# Patient Record
Sex: FEMALE | Race: WHITE | NOT HISPANIC OR LATINO | Employment: OTHER | URBAN - METROPOLITAN AREA
[De-identification: names, ages, dates, MRNs, and addresses within clinical notes are randomized per-mention and may not be internally consistent; named-entity substitution may affect disease eponyms.]

---

## 2017-06-27 ENCOUNTER — HOSPITAL ENCOUNTER (OUTPATIENT)
Dept: MAMMOGRAPHY | Facility: HOSPITAL | Age: 72
Discharge: HOME | End: 2017-06-27
Attending: SPECIALIST | Admitting: SPECIALIST

## 2017-08-15 ENCOUNTER — HOSPITAL ENCOUNTER (OUTPATIENT)
Dept: ONCOLOGY | Facility: CLINIC | Age: 72
Discharge: HOME | End: 2017-08-15
Attending: FAMILY MEDICINE | Admitting: FAMILY MEDICINE

## 2017-09-01 ENCOUNTER — HOSPITAL ENCOUNTER (OUTPATIENT)
Dept: FAMILY MEDICINE CLINIC | Facility: CLINIC | Age: 72
Setting detail: SPECIMEN
Discharge: HOME | End: 2017-09-01
Attending: FAMILY MEDICINE | Admitting: FAMILY MEDICINE

## 2017-11-01 ENCOUNTER — HOSPITAL ENCOUNTER (OUTPATIENT)
Dept: FAMILY MEDICINE CLINIC | Facility: CLINIC | Age: 72
Setting detail: SPECIMEN
Discharge: HOME | End: 2017-11-01
Attending: FAMILY MEDICINE | Admitting: FAMILY MEDICINE

## 2017-11-01 LAB
ALBUMIN SERPL-MCNC: 4.2 G/DL (ref 3.5–4.8)
ALBUMIN/GLOB SERPL: 1.4 {RATIO} (ref 1–1.7)
ALP SERPL-CCNC: 60 IU/L (ref 32–91)
ALT SERPL-CCNC: 31 IU/L (ref 14–54)
ANION GAP SERPL CALC-SCNC: 11.7 MMOL/L (ref 10–20)
AST SERPL-CCNC: 25 IU/L (ref 15–41)
BASOPHILS # BLD AUTO: 0.1 10*3/UL (ref 0–0.2)
BASOPHILS NFR BLD AUTO: 1 % (ref 0–2)
BILIRUB SERPL-MCNC: 0.1 MG/DL (ref 0.3–1.2)
BILIRUB UR QL STRIP: NEGATIVE MG/DL
BUN SERPL-MCNC: 22 MG/DL (ref 8–20)
BUN/CREAT SERPL: 27.5 (ref 5.4–26.2)
CALCIUM SERPL-MCNC: 9.8 MG/DL (ref 8.9–10.3)
CASTS URNS QL MICRO: ABNORMAL /[LPF]
CHLORIDE SERPL-SCNC: 101 MMOL/L (ref 101–111)
CHOLEST SERPL-MCNC: 129 MG/DL
CHOLEST/HDLC SERPL: 3.4 {RATIO}
CK SERPL-CCNC: 152 IU/L (ref 38–234)
COLOR UR: YELLOW
CONV BACTERIA IN URINE MICRO: NEGATIVE
CONV CLARITY OF URINE: ABNORMAL
CONV CO2: 32 MMOL/L (ref 22–32)
CONV HYALINE CASTS IN URINE MICRO: 1 /[LPF] (ref 0–5)
CONV LDL CHOLESTEROL DIRECT: 58 MG/DL (ref 0–100)
CONV PROTEIN IN URINE BY AUTOMATED TEST STRIP: NEGATIVE MG/DL
CONV SMALL ROUND CELLS: ABNORMAL /[HPF]
CONV TOTAL PROTEIN: 7.3 G/DL (ref 6.1–7.9)
CONV UROBILINOGEN IN URINE BY AUTOMATED TEST STRIP: 0.2 MG/DL
CREAT UR-MCNC: 0.8 MG/DL (ref 0.4–1)
CRP SERPL-MCNC: 0.19 MG/DL (ref 0–0.7)
CULTURE INDICATED?: ABNORMAL
DIFFERENTIAL METHOD BLD: (no result)
EOSINOPHIL # BLD AUTO: 0.2 10*3/UL (ref 0–0.3)
EOSINOPHIL # BLD AUTO: 3 % (ref 0–3)
ERYTHROCYTE [DISTWIDTH] IN BLOOD BY AUTOMATED COUNT: 13.1 % (ref 11.5–14.5)
ERYTHROCYTE [SEDIMENTATION RATE] IN BLOOD BY WESTERGREN METHOD: 12 MM/HR (ref 0–30)
GLOBULIN UR ELPH-MCNC: 3.1 G/DL (ref 2.5–3.8)
GLUCOSE SERPL-MCNC: 90 MG/DL (ref 65–99)
GLUCOSE UR QL: NEGATIVE MG/DL
HCT VFR BLD AUTO: 43.3 % (ref 35–49)
HDLC SERPL-MCNC: 38 MG/DL
HGB BLD-MCNC: 14.4 G/DL (ref 12–15)
HGB UR QL STRIP: NEGATIVE
KETONES UR QL STRIP: NEGATIVE MG/DL
LDLC/HDLC SERPL: 1.6 {RATIO}
LEUKOCYTE ESTERASE UR QL STRIP: NEGATIVE
LIPID INTERPRETATION: ABNORMAL
LYMPHOCYTES # BLD AUTO: 2 10*3/UL (ref 0.8–4.8)
LYMPHOCYTES NFR BLD AUTO: 32 % (ref 18–42)
MCH RBC QN AUTO: 31.2 PG (ref 26–32)
MCHC RBC AUTO-ENTMCNC: 33.4 G/DL (ref 32–36)
MCV RBC AUTO: 93.6 FL (ref 80–94)
MONOCYTES # BLD AUTO: 0.5 10*3/UL (ref 0.1–1.3)
MONOCYTES NFR BLD AUTO: 8 % (ref 2–11)
NEUTROPHILS # BLD AUTO: 3.6 10*3/UL (ref 2.3–8.6)
NEUTROPHILS NFR BLD AUTO: 56 % (ref 50–75)
NITRITE UR QL STRIP: NEGATIVE
NRBC BLD AUTO-RTO: 0 /100{WBCS}
NRBC/RBC NFR BLD MANUAL: 0 10*3/UL
PH UR STRIP.AUTO: 5.5 [PH] (ref 4.5–8)
PLATELET # BLD AUTO: 206 10*3/UL (ref 150–450)
PMV BLD AUTO: 7.9 FL (ref 7.4–10.4)
POTASSIUM SERPL-SCNC: 4.7 MMOL/L (ref 3.6–5.1)
RBC # BLD AUTO: 4.62 10*6/UL (ref 4–5.4)
RBC #/AREA URNS HPF: NEGATIVE /[HPF] (ref 0–3)
SODIUM SERPL-SCNC: 140 MMOL/L (ref 136–144)
SP GR UR: 1.03 (ref 1–1.03)
SPERM URNS QL MICRO: ABNORMAL /[HPF]
SQUAMOUS SPT QL MICRO: 1 /[HPF] (ref 0–5)
TRIGL SERPL-MCNC: 241 MG/DL
TSH SERPL-ACNC: 3.65 UIU/ML (ref 0.34–5.6)
UNIDENT CRYS URNS QL MICRO: ABNORMAL /[HPF]
VIT B12 SERPL-MCNC: 1108 PG/ML (ref 180–914)
VLDLC SERPL CALC-MCNC: 33.1 MG/DL
WBC # BLD AUTO: 6.4 10*3/UL (ref 4.5–11.5)
WBC #/AREA URNS HPF: 1 /[HPF] (ref 0–5)
YEAST SPEC QL WET PREP: ABNORMAL /[HPF]

## 2017-11-02 ENCOUNTER — HOSPITAL ENCOUNTER (OUTPATIENT)
Dept: NUCLEAR MEDICINE | Facility: HOSPITAL | Age: 72
Discharge: HOME | End: 2017-11-02
Attending: FAMILY MEDICINE | Admitting: FAMILY MEDICINE

## 2017-11-03 LAB
ANA SER QL IA: NORMAL

## 2018-10-24 ENCOUNTER — HOSPITAL ENCOUNTER (OUTPATIENT)
Dept: FAMILY MEDICINE CLINIC | Facility: CLINIC | Age: 73
Setting detail: SPECIMEN
Discharge: HOME | End: 2018-10-24
Attending: FAMILY MEDICINE | Admitting: FAMILY MEDICINE

## 2018-10-24 LAB
25(OH)D3 SERPL-MCNC: 39 NG/ML (ref 30–100)
ALBUMIN SERPL-MCNC: 3.8 G/DL (ref 3.5–4.8)
ALBUMIN/GLOB SERPL: 1.2 {RATIO} (ref 1–1.7)
ALP SERPL-CCNC: 69 IU/L (ref 32–91)
ALT SERPL-CCNC: 31 IU/L (ref 14–54)
ANION GAP SERPL CALC-SCNC: 14 MMOL/L (ref 10–20)
AST SERPL-CCNC: 25 IU/L (ref 15–41)
BASOPHILS # BLD AUTO: 0.1 10*3/UL (ref 0–0.2)
BASOPHILS NFR BLD AUTO: 1 % (ref 0–2)
BILIRUB SERPL-MCNC: 0.8 MG/DL (ref 0.3–1.2)
BILIRUB UR QL STRIP: NEGATIVE MG/DL
BUN SERPL-MCNC: 21 MG/DL (ref 8–20)
BUN/CREAT SERPL: 23.3 (ref 5.4–26.2)
CALCIUM SERPL-MCNC: 9.5 MG/DL (ref 8.9–10.3)
CASTS URNS QL MICRO: ABNORMAL /[LPF]
CHLORIDE SERPL-SCNC: 103 MMOL/L (ref 101–111)
CHOLEST SERPL-MCNC: 134 MG/DL
CHOLEST/HDLC SERPL: 3.3 {RATIO}
COLOR UR: YELLOW
CONV BACTERIA IN URINE MICRO: NEGATIVE
CONV CLARITY OF URINE: CLEAR
CONV CO2: 30 MMOL/L (ref 22–32)
CONV HYALINE CASTS IN URINE MICRO: 1 /[LPF] (ref 0–5)
CONV LDL CHOLESTEROL DIRECT: 74 MG/DL (ref 0–100)
CONV PROTEIN IN URINE BY AUTOMATED TEST STRIP: NEGATIVE MG/DL
CONV SMALL ROUND CELLS: ABNORMAL /[HPF]
CONV TOTAL PROTEIN: 7.1 G/DL (ref 6.1–7.9)
CONV UROBILINOGEN IN URINE BY AUTOMATED TEST STRIP: 0.2 MG/DL
CREAT UR-MCNC: 0.9 MG/DL (ref 0.4–1)
CULTURE INDICATED?: ABNORMAL
DIFFERENTIAL METHOD BLD: (no result)
EOSINOPHIL # BLD AUTO: 0.1 10*3/UL (ref 0–0.3)
EOSINOPHIL # BLD AUTO: 2 % (ref 0–3)
ERYTHROCYTE [DISTWIDTH] IN BLOOD BY AUTOMATED COUNT: 13.6 % (ref 11.5–14.5)
ERYTHROCYTE [SEDIMENTATION RATE] IN BLOOD BY WESTERGREN METHOD: 27 MM/HR (ref 0–30)
GLOBULIN UR ELPH-MCNC: 3.3 G/DL (ref 2.5–3.8)
GLUCOSE SERPL-MCNC: 96 MG/DL (ref 65–99)
GLUCOSE UR QL: NEGATIVE MG/DL
HBA1C MFR BLD: 5.7 % (ref 0–5.6)
HCT VFR BLD AUTO: 40.7 % (ref 35–49)
HDLC SERPL-MCNC: 41 MG/DL
HGB BLD-MCNC: 13.4 G/DL (ref 12–15)
HGB UR QL STRIP: NEGATIVE
KETONES UR QL STRIP: NEGATIVE MG/DL
LDLC/HDLC SERPL: 1.8 {RATIO}
LEUKOCYTE ESTERASE UR QL STRIP: ABNORMAL
LIPID INTERPRETATION: NORMAL
LYMPHOCYTES # BLD AUTO: 1.8 10*3/UL (ref 0.8–4.8)
LYMPHOCYTES NFR BLD AUTO: 24 % (ref 18–42)
MCH RBC QN AUTO: 30.8 PG (ref 26–32)
MCHC RBC AUTO-ENTMCNC: 32.9 G/DL (ref 32–36)
MCV RBC AUTO: 93.7 FL (ref 80–94)
MONOCYTES # BLD AUTO: 0.5 10*3/UL (ref 0.1–1.3)
MONOCYTES NFR BLD AUTO: 7 % (ref 2–11)
NEUTROPHILS # BLD AUTO: 5 10*3/UL (ref 2.3–8.6)
NEUTROPHILS NFR BLD AUTO: 66 % (ref 50–75)
NITRITE UR QL STRIP: NEGATIVE
NRBC BLD AUTO-RTO: 0 /100{WBCS}
NRBC/RBC NFR BLD MANUAL: 0 10*3/UL
PH UR STRIP.AUTO: 6.5 [PH] (ref 4.5–8)
PLATELET # BLD AUTO: 209 10*3/UL (ref 150–450)
PMV BLD AUTO: 8.6 FL (ref 7.4–10.4)
POTASSIUM SERPL-SCNC: 5 MMOL/L (ref 3.6–5.1)
RBC # BLD AUTO: 4.35 10*6/UL (ref 4–5.4)
RBC #/AREA URNS HPF: 2 /[HPF] (ref 0–3)
SODIUM SERPL-SCNC: 142 MMOL/L (ref 136–144)
SP GR UR: 1.02 (ref 1–1.03)
SPERM URNS QL MICRO: ABNORMAL /[HPF]
SQUAMOUS SPT QL MICRO: 4 /[HPF] (ref 0–5)
T4 FREE SERPL-MCNC: 0.74 NG/DL (ref 0.58–1.64)
TRIGL SERPL-MCNC: 112 MG/DL
TSH SERPL-ACNC: 2.42 UIU/ML (ref 0.34–5.6)
UNIDENT CRYS URNS QL MICRO: ABNORMAL /[HPF]
VIT B12 SERPL-MCNC: 1106 PG/ML (ref 180–914)
VLDLC SERPL CALC-MCNC: 19.2 MG/DL
WBC # BLD AUTO: 7.5 10*3/UL (ref 4.5–11.5)
WBC #/AREA URNS HPF: 5 /[HPF] (ref 0–5)
YEAST SPEC QL WET PREP: ABNORMAL /[HPF]

## 2018-12-10 ENCOUNTER — CLINICAL SUPPORT (OUTPATIENT)
Dept: ONCOLOGY | Facility: HOSPITAL | Age: 73
End: 2018-12-10

## 2018-12-10 ENCOUNTER — HOSPITAL ENCOUNTER (OUTPATIENT)
Dept: ONCOLOGY | Facility: CLINIC | Age: 73
Setting detail: INFUSION SERIES
Discharge: HOME | End: 2018-12-10
Attending: INTERNAL MEDICINE | Admitting: INTERNAL MEDICINE

## 2019-01-25 ENCOUNTER — HOSPITAL ENCOUNTER (OUTPATIENT)
Dept: ONCOLOGY | Facility: CLINIC | Age: 74
Setting detail: INFUSION SERIES
Discharge: HOME | End: 2019-01-25
Attending: INTERNAL MEDICINE | Admitting: INTERNAL MEDICINE

## 2019-01-25 ENCOUNTER — CLINICAL SUPPORT (OUTPATIENT)
Dept: ONCOLOGY | Facility: HOSPITAL | Age: 74
End: 2019-01-25

## 2019-02-15 ENCOUNTER — HOSPITAL ENCOUNTER (OUTPATIENT)
Dept: URGENT CARE | Facility: CLINIC | Age: 74
Discharge: HOME | End: 2019-02-15
Attending: FAMILY MEDICINE | Admitting: FAMILY MEDICINE

## 2019-06-17 RX ORDER — PHENTERMINE HYDROCHLORIDE 37.5 MG/1
TABLET ORAL
Qty: 30 TABLET | Refills: 0 | Status: SHIPPED | OUTPATIENT
Start: 2019-06-17 | End: 2019-08-20 | Stop reason: SDUPTHER

## 2019-06-28 RX ORDER — HYDROCODONE BITARTRATE AND ACETAMINOPHEN 10; 325 MG/1; MG/1
TABLET ORAL
COMMUNITY
Start: 2019-02-01 | End: 2019-06-28 | Stop reason: SDUPTHER

## 2019-06-28 RX ORDER — HYDROCODONE BITARTRATE AND ACETAMINOPHEN 10; 325 MG/1; MG/1
TABLET ORAL
Qty: 120 TABLET | Refills: 0 | Status: SHIPPED | OUTPATIENT
Start: 2019-06-28 | End: 2019-07-29 | Stop reason: SDUPTHER

## 2019-07-29 ENCOUNTER — TELEPHONE (OUTPATIENT)
Dept: FAMILY MEDICINE CLINIC | Facility: CLINIC | Age: 74
End: 2019-07-29

## 2019-07-29 RX ORDER — HYDROCODONE BITARTRATE AND ACETAMINOPHEN 10; 325 MG/1; MG/1
TABLET ORAL
Qty: 120 TABLET | Refills: 0 | Status: SHIPPED | OUTPATIENT
Start: 2019-07-29 | End: 2019-08-29 | Stop reason: SDUPTHER

## 2019-08-21 RX ORDER — PHENTERMINE HYDROCHLORIDE 37.5 MG/1
TABLET ORAL
Qty: 30 TABLET | Refills: 0 | Status: SHIPPED | OUTPATIENT
Start: 2019-08-21 | End: 2019-10-17 | Stop reason: SDUPTHER

## 2019-08-29 ENCOUNTER — TELEPHONE (OUTPATIENT)
Dept: FAMILY MEDICINE CLINIC | Facility: CLINIC | Age: 74
End: 2019-08-29

## 2019-08-29 RX ORDER — HYDROCODONE BITARTRATE AND ACETAMINOPHEN 10; 325 MG/1; MG/1
TABLET ORAL
Qty: 120 TABLET | Refills: 0 | Status: SHIPPED | OUTPATIENT
Start: 2019-08-29 | End: 2019-09-27 | Stop reason: SDUPTHER

## 2019-09-18 ENCOUNTER — TELEPHONE (OUTPATIENT)
Dept: FAMILY MEDICINE CLINIC | Facility: CLINIC | Age: 74
End: 2019-09-18

## 2019-09-18 NOTE — TELEPHONE ENCOUNTER
Patient has heard there are different types of flu shots available at the pharmacies.  I know we just carry the standard one.  Is there one you recommend for her and her  since they are senior citizens?  Please advise.

## 2019-09-27 RX ORDER — HYDROCODONE BITARTRATE AND ACETAMINOPHEN 10; 325 MG/1; MG/1
TABLET ORAL
Qty: 120 TABLET | Refills: 0 | Status: SHIPPED | OUTPATIENT
Start: 2019-09-27 | End: 2019-10-25 | Stop reason: SDUPTHER

## 2019-10-01 ENCOUNTER — TELEPHONE (OUTPATIENT)
Dept: FAMILY MEDICINE CLINIC | Facility: CLINIC | Age: 74
End: 2019-10-01

## 2019-10-01 NOTE — TELEPHONE ENCOUNTER
Patient is asking if she should get Shingrix. She states she has a compromised immune system and has researched that it is not a good thing to get. 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 Please advise.

## 2019-10-01 NOTE — TELEPHONE ENCOUNTER
Its really up to Maylin.   I think the vaccine is safer then getting the actual disease so its a coin toss for her.  She can just wait and hope she never gets shingles or she can take the risk of the vaccine which is certainly a fact but its not very common that people get into trouble with it.

## 2019-10-02 ENCOUNTER — TELEPHONE (OUTPATIENT)
Dept: FAMILY MEDICINE CLINIC | Facility: CLINIC | Age: 74
End: 2019-10-02

## 2019-10-02 NOTE — TELEPHONE ENCOUNTER
Patient needs the Shingrix.  Tell her that since she is 5 years out from the initial shingles vaccine she can get the new shin Grix at any time.  I would recommend that and not a repeat Zostrix.  I am not even sure they carry it anymore

## 2019-10-02 NOTE — TELEPHONE ENCOUNTER
Patient had a zoster vaccine in 2012.  Should she get another zoster vaccine instead of getting the Shingrix?  Leave a message if no answer.

## 2019-10-17 RX ORDER — PHENTERMINE HYDROCHLORIDE 37.5 MG/1
TABLET ORAL
Qty: 30 TABLET | Refills: 0 | Status: SHIPPED | OUTPATIENT
Start: 2019-10-17 | End: 2019-11-27 | Stop reason: SDUPTHER

## 2019-10-25 RX ORDER — HYDROCODONE BITARTRATE AND ACETAMINOPHEN 10; 325 MG/1; MG/1
TABLET ORAL
Qty: 120 TABLET | Refills: 0 | Status: SHIPPED | OUTPATIENT
Start: 2019-10-25 | End: 2019-11-25 | Stop reason: SDUPTHER

## 2019-11-07 RX ORDER — CELECOXIB 200 MG/1
CAPSULE ORAL
Qty: 180 CAPSULE | Refills: 4 | Status: SHIPPED | OUTPATIENT
Start: 2019-11-07 | End: 2021-01-15 | Stop reason: SDUPTHER

## 2019-11-14 RX ORDER — ATORVASTATIN CALCIUM 20 MG/1
20 TABLET, FILM COATED ORAL DAILY
COMMUNITY
End: 2019-11-14

## 2019-11-14 RX ORDER — ATORVASTATIN CALCIUM 10 MG/1
10 TABLET, FILM COATED ORAL DAILY
Qty: 90 TABLET | Refills: 1 | Status: SHIPPED | OUTPATIENT
Start: 2019-11-14 | End: 2020-04-06 | Stop reason: SDUPTHER

## 2019-11-18 ENCOUNTER — TELEPHONE (OUTPATIENT)
Dept: FAMILY MEDICINE CLINIC | Facility: CLINIC | Age: 74
End: 2019-11-18

## 2019-11-18 NOTE — TELEPHONE ENCOUNTER
Patient wanted a note put in her chart that all her maintenance medications need to go to [facility]. Thank you.

## 2019-11-25 ENCOUNTER — TELEPHONE (OUTPATIENT)
Dept: FAMILY MEDICINE CLINIC | Facility: CLINIC | Age: 74
End: 2019-11-25

## 2019-11-25 RX ORDER — HYDROCODONE BITARTRATE AND ACETAMINOPHEN 10; 325 MG/1; MG/1
TABLET ORAL
Qty: 120 TABLET | Refills: 0 | Status: SHIPPED | OUTPATIENT
Start: 2019-11-25 | End: 2019-12-26 | Stop reason: SDUPTHER

## 2019-11-27 ENCOUNTER — TELEPHONE (OUTPATIENT)
Dept: FAMILY MEDICINE CLINIC | Facility: CLINIC | Age: 74
End: 2019-11-27

## 2019-11-27 ENCOUNTER — OFFICE VISIT (OUTPATIENT)
Dept: FAMILY MEDICINE CLINIC | Facility: CLINIC | Age: 74
End: 2019-11-27

## 2019-11-27 VITALS
WEIGHT: 197.8 LBS | SYSTOLIC BLOOD PRESSURE: 140 MMHG | BODY MASS INDEX: 31.79 KG/M2 | TEMPERATURE: 98 F | RESPIRATION RATE: 16 BRPM | HEART RATE: 79 BPM | OXYGEN SATURATION: 95 % | HEIGHT: 66 IN | DIASTOLIC BLOOD PRESSURE: 74 MMHG

## 2019-11-27 DIAGNOSIS — E66.3 OVERWEIGHT: ICD-10-CM

## 2019-11-27 DIAGNOSIS — M15.9 PRIMARY OSTEOARTHRITIS INVOLVING MULTIPLE JOINTS: ICD-10-CM

## 2019-11-27 DIAGNOSIS — E78.2 MIXED HYPERLIPIDEMIA: Primary | ICD-10-CM

## 2019-11-27 DIAGNOSIS — K21.00 GASTRO-ESOPHAGEAL REFLUX DISEASE WITH ESOPHAGITIS: ICD-10-CM

## 2019-11-27 DIAGNOSIS — E55.9 VITAMIN D DEFICIENCY, UNSPECIFIED: ICD-10-CM

## 2019-11-27 DIAGNOSIS — G89.29 CHRONIC BILATERAL LOW BACK PAIN WITHOUT SCIATICA: ICD-10-CM

## 2019-11-27 DIAGNOSIS — Z00.00 MEDICARE ANNUAL WELLNESS VISIT, SUBSEQUENT: ICD-10-CM

## 2019-11-27 DIAGNOSIS — M54.50 CHRONIC BILATERAL LOW BACK PAIN WITHOUT SCIATICA: ICD-10-CM

## 2019-11-27 DIAGNOSIS — I10 ESSENTIAL HYPERTENSION: ICD-10-CM

## 2019-11-27 DIAGNOSIS — E11.9 TYPE 2 DIABETES MELLITUS WITHOUT COMPLICATION, WITHOUT LONG-TERM CURRENT USE OF INSULIN (HCC): ICD-10-CM

## 2019-11-27 PROBLEM — E78.5 HYPERLIPIDEMIA: Status: ACTIVE | Noted: 2019-11-27

## 2019-11-27 LAB
25(OH)D3 SERPL-MCNC: 49.3 NG/ML (ref 30–100)
ALBUMIN SERPL-MCNC: 4.8 G/DL (ref 3.5–5.2)
ALBUMIN/GLOB SERPL: 1.8 G/DL
ALP SERPL-CCNC: 57 U/L (ref 39–117)
ALT SERPL W P-5'-P-CCNC: 33 U/L (ref 1–33)
ANION GAP SERPL CALCULATED.3IONS-SCNC: 11.4 MMOL/L (ref 5–15)
AST SERPL-CCNC: 22 U/L (ref 1–32)
BASOPHILS # BLD AUTO: 0.03 10*3/MM3 (ref 0–0.2)
BASOPHILS NFR BLD AUTO: 0.6 % (ref 0–1.5)
BILIRUB SERPL-MCNC: 0.3 MG/DL (ref 0.2–1.2)
BILIRUB UR QL STRIP: NEGATIVE
BUN BLD-MCNC: 20 MG/DL (ref 8–23)
BUN/CREAT SERPL: 22.5 (ref 7–25)
CALCIUM SPEC-SCNC: 10 MG/DL (ref 8.6–10.5)
CHLORIDE SERPL-SCNC: 102 MMOL/L (ref 98–107)
CHOLEST SERPL-MCNC: 102 MG/DL (ref 0–200)
CLARITY UR: CLEAR
CO2 SERPL-SCNC: 30.6 MMOL/L (ref 22–29)
COLOR UR: YELLOW
CREAT BLD-MCNC: 0.89 MG/DL (ref 0.57–1)
DEPRECATED RDW RBC AUTO: 42.9 FL (ref 37–54)
EOSINOPHIL # BLD AUTO: 0.11 10*3/MM3 (ref 0–0.4)
EOSINOPHIL NFR BLD AUTO: 2.3 % (ref 0.3–6.2)
ERYTHROCYTE [DISTWIDTH] IN BLOOD BY AUTOMATED COUNT: 12.6 % (ref 12.3–15.4)
ERYTHROCYTE [SEDIMENTATION RATE] IN BLOOD: 7 MM/HR (ref 0–30)
FERRITIN SERPL-MCNC: 60.6 NG/ML (ref 13–150)
GFR SERPL CREATININE-BSD FRML MDRD: 62 ML/MIN/1.73
GLOBULIN UR ELPH-MCNC: 2.6 GM/DL
GLUCOSE BLD-MCNC: 104 MG/DL (ref 65–99)
GLUCOSE UR STRIP-MCNC: NEGATIVE MG/DL
HBA1C MFR BLD: 5.5 % (ref 3.5–5.6)
HCT VFR BLD AUTO: 39.6 % (ref 34–46.6)
HDLC SERPL-MCNC: 35 MG/DL (ref 40–60)
HGB BLD-MCNC: 13.5 G/DL (ref 12–15.9)
HGB UR QL STRIP.AUTO: NEGATIVE
IMM GRANULOCYTES # BLD AUTO: 0.02 10*3/MM3 (ref 0–0.05)
IMM GRANULOCYTES NFR BLD AUTO: 0.4 % (ref 0–0.5)
KETONES UR QL STRIP: NEGATIVE
LDLC SERPL CALC-MCNC: 49 MG/DL (ref 0–100)
LDLC/HDLC SERPL: 1.39 {RATIO}
LEUKOCYTE ESTERASE UR QL STRIP.AUTO: NEGATIVE
LYMPHOCYTES # BLD AUTO: 1.34 10*3/MM3 (ref 0.7–3.1)
LYMPHOCYTES NFR BLD AUTO: 28.6 % (ref 19.6–45.3)
MCH RBC QN AUTO: 32 PG (ref 26.6–33)
MCHC RBC AUTO-ENTMCNC: 34.1 G/DL (ref 31.5–35.7)
MCV RBC AUTO: 93.8 FL (ref 79–97)
MONOCYTES # BLD AUTO: 0.44 10*3/MM3 (ref 0.1–0.9)
MONOCYTES NFR BLD AUTO: 9.4 % (ref 5–12)
NEUTROPHILS # BLD AUTO: 2.75 10*3/MM3 (ref 1.7–7)
NEUTROPHILS NFR BLD AUTO: 58.7 % (ref 42.7–76)
NITRITE UR QL STRIP: NEGATIVE
NRBC BLD AUTO-RTO: 0 /100 WBC (ref 0–0.2)
PH UR STRIP.AUTO: 6.5 [PH] (ref 5–8)
PLATELET # BLD AUTO: 214 10*3/MM3 (ref 140–450)
PMV BLD AUTO: 11.1 FL (ref 6–12)
POTASSIUM BLD-SCNC: 4.4 MMOL/L (ref 3.5–5.2)
PROT SERPL-MCNC: 7.4 G/DL (ref 6–8.5)
PROT UR QL STRIP: NEGATIVE
RBC # BLD AUTO: 4.22 10*6/MM3 (ref 3.77–5.28)
SODIUM BLD-SCNC: 144 MMOL/L (ref 136–145)
SP GR UR STRIP: 1.02 (ref 1–1.03)
T4 FREE SERPL-MCNC: 1.28 NG/DL (ref 0.93–1.7)
TRIGL SERPL-MCNC: 92 MG/DL (ref 0–150)
TSH SERPL DL<=0.05 MIU/L-ACNC: 2.41 UIU/ML (ref 0.27–4.2)
UROBILINOGEN UR QL STRIP: NORMAL
VIT B12 BLD-MCNC: 1009 PG/ML (ref 211–946)
VLDLC SERPL-MCNC: 18.4 MG/DL (ref 5–40)
WBC NRBC COR # BLD: 4.69 10*3/MM3 (ref 3.4–10.8)

## 2019-11-27 PROCEDURE — 82728 ASSAY OF FERRITIN: CPT | Performed by: FAMILY MEDICINE

## 2019-11-27 PROCEDURE — 82607 VITAMIN B-12: CPT | Performed by: FAMILY MEDICINE

## 2019-11-27 PROCEDURE — 99213 OFFICE O/P EST LOW 20 MIN: CPT | Performed by: FAMILY MEDICINE

## 2019-11-27 PROCEDURE — 84443 ASSAY THYROID STIM HORMONE: CPT | Performed by: FAMILY MEDICINE

## 2019-11-27 PROCEDURE — 81003 URINALYSIS AUTO W/O SCOPE: CPT | Performed by: FAMILY MEDICINE

## 2019-11-27 PROCEDURE — 82306 VITAMIN D 25 HYDROXY: CPT | Performed by: FAMILY MEDICINE

## 2019-11-27 PROCEDURE — 84439 ASSAY OF FREE THYROXINE: CPT | Performed by: FAMILY MEDICINE

## 2019-11-27 PROCEDURE — 80053 COMPREHEN METABOLIC PANEL: CPT | Performed by: FAMILY MEDICINE

## 2019-11-27 PROCEDURE — 85025 COMPLETE CBC W/AUTO DIFF WBC: CPT | Performed by: FAMILY MEDICINE

## 2019-11-27 PROCEDURE — 80061 LIPID PANEL: CPT | Performed by: FAMILY MEDICINE

## 2019-11-27 PROCEDURE — 85652 RBC SED RATE AUTOMATED: CPT | Performed by: FAMILY MEDICINE

## 2019-11-27 PROCEDURE — 83036 HEMOGLOBIN GLYCOSYLATED A1C: CPT | Performed by: FAMILY MEDICINE

## 2019-11-27 PROCEDURE — G0439 PPPS, SUBSEQ VISIT: HCPCS | Performed by: FAMILY MEDICINE

## 2019-11-27 RX ORDER — ASPIRIN 81 MG/1
81 TABLET ORAL DAILY
COMMUNITY
End: 2021-05-25 | Stop reason: ALTCHOICE

## 2019-11-27 RX ORDER — QUINAPRIL 40 MG/1
TABLET ORAL
COMMUNITY
Start: 2019-09-07 | End: 2020-04-01 | Stop reason: SDUPTHER

## 2019-11-27 RX ORDER — AMOXICILLIN 500 MG
CAPSULE ORAL
COMMUNITY
End: 2021-08-26

## 2019-11-27 RX ORDER — MELATONIN
DAILY
COMMUNITY
End: 2021-05-25 | Stop reason: SDUPTHER

## 2019-11-27 RX ORDER — ESOMEPRAZOLE MAGNESIUM 40 MG/1
CAPSULE, DELAYED RELEASE ORAL
COMMUNITY
Start: 2015-12-28 | End: 2021-05-25 | Stop reason: ALTCHOICE

## 2019-11-27 RX ORDER — AMLODIPINE BESYLATE 10 MG/1
TABLET ORAL
COMMUNITY
Start: 2019-09-21 | End: 2019-12-20

## 2019-11-27 RX ORDER — IBUPROFEN 400 MG/1
400 TABLET ORAL
COMMUNITY
End: 2021-01-18

## 2019-11-27 RX ORDER — CARISOPRODOL 350 MG/1
350 TABLET ORAL DAILY
COMMUNITY
End: 2020-06-15 | Stop reason: SDUPTHER

## 2019-11-27 RX ORDER — FLUOXETINE HYDROCHLORIDE 20 MG/1
CAPSULE ORAL
COMMUNITY
Start: 2019-10-09 | End: 2020-04-06

## 2019-11-27 RX ORDER — PHENTERMINE HYDROCHLORIDE 37.5 MG/1
37.5 TABLET ORAL DAILY
Qty: 30 TABLET | Refills: 0 | Status: SHIPPED | OUTPATIENT
Start: 2019-11-27 | End: 2019-12-26 | Stop reason: SDUPTHER

## 2019-11-27 RX ORDER — QUINIDINE GLUCONATE 324 MG
TABLET, EXTENDED RELEASE ORAL 2 TIMES DAILY WITH MEALS
COMMUNITY
End: 2022-09-05

## 2019-11-27 RX ORDER — MULTIVITAMIN
TABLET ORAL
COMMUNITY

## 2019-11-27 RX ORDER — PANTOPRAZOLE SODIUM 40 MG/1
TABLET, DELAYED RELEASE ORAL
COMMUNITY
Start: 2019-11-24 | End: 2021-01-20 | Stop reason: SDUPTHER

## 2019-11-27 RX ORDER — CHOLECALCIFEROL (VITAMIN D3) 125 MCG
CAPSULE ORAL
COMMUNITY
End: 2021-08-26

## 2019-11-27 NOTE — TELEPHONE ENCOUNTER
Patient called stating that she forgot to tell you at appt that she would like script for PHENTERMINE that was discussed to be [facility].

## 2019-11-27 NOTE — ASSESSMENT & PLAN NOTE
Patient is up to date with exam, labs, mammo, colon and immunizations.  We will continue to follow closely and make sure she stays up-to-date with her screening but so far so good.

## 2019-11-27 NOTE — PROGRESS NOTES
The ABCs of the Annual Wellness Visit  Subsequent Medicare Wellness Visit    Chief Complaint   Patient presents with   • Medicare Wellness-subsequent       Subjective   History of Present Illness:  Maylin Garcia is a 74 y.o. female who presents for a Subsequent Medicare Wellness Visit.  Maylin has done a good job and staying up with her preventative care.  Mammogram is up-to-date.  DEXA up-to-date.  Colonoscopy up-to-date.  Immunizations up-to-date.  She will have a health risk assessment today and if we are behind on any screening we will plan on catching her up.        [facility] is also here today for a general physical exam and laboratory panel.  The medical problems that we follow her for in the office include hyperlipidemia, hypertension, gastroesophageal reflux disease with esophagitis, type 2 diabetes, chronic low back pain, degenerative joint disease, and obesity.  These problems will be addressed today and depending on lab results adjustments made in her treatments.      HEALTH RISK ASSESSMENT    Recent Hospitalizations:  No hospitalization(s) within the last year.    Current Medical Providers:  Patient Care Team:  Abiodun Busch MD as PCP - General (Family Medicine)  Enoc Anthony DPM as PCP - Claims Attributed    Smoking Status:  Social History     Tobacco Use   Smoking Status Former Smoker   Smokeless Tobacco Never Used       Alcohol Consumption:  Social History     Substance and Sexual Activity   Alcohol Use No   • Frequency: Never       Depression Screen:   PHQ-2/PHQ-9 Depression Screening 11/27/2019   Little interest or pleasure in doing things 0   Feeling down, depressed, or hopeless 0   Total Score 0       Fall Risk Screen:  SHANTHIADI Fall Risk Assessment was completed, and patient is at MODERATE risk for falls. Assessment completed on:11/27/2019    Health Habits and Functional and Cognitive Screening:  Functional & Cognitive Status 11/27/2019   Do you have difficulty preparing food and  eating? No   Do you have difficulty bathing yourself, getting dressed or grooming yourself? No   Do you have difficulty using the toilet? No   Do you have difficulty moving around from place to place? No   Do you have trouble with steps or getting out of a bed or a chair? No   Current Diet Well Balanced Diet   Dental Exam Up to date   Eye Exam Up to date   Exercise (times per week) 4 times per week   Current Exercise Activities Include Cardiovasular Workout on Exercise Equipment   Do you need help using the phone?  No   Are you deaf or do you have serious difficulty hearing?  No   Do you need help with transportation? No   Do you need help shopping? No   Do you need help preparing meals?  No   Do you need help with housework?  No   Do you need help with laundry? No   Do you need help taking your medications? No   Do you need help managing money? No   Do you ever drive or ride in a car without wearing a seat belt? No   Have you felt unusual stress, anger or loneliness in the last month? No   Who do you live with? Spouse   If you need help, do you have trouble finding someone available to you? No   Have you been bothered in the last four weeks by sexual problems? No   Do you have difficulty concentrating, remembering or making decisions? No         Does the patient have evidence of cognitive impairment? No    Asprin use counseling:Taking ASA appropriately as indicated    Age-appropriate Screening Schedule:  Refer to the list below for future screening recommendations based on patient's age, sex and/or medical conditions. Orders for these recommended tests are listed in the plan section. The patient has been provided with a written plan.    Health Maintenance   Topic Date Due   • URINE MICROALBUMIN  1945   • ZOSTER VACCINE (1 of 2) 11/23/1997   • PNEUMOCOCCAL VACCINES (65+ LOW/MEDIUM RISK) (2 of 2 - PCV13) 07/06/2016   • DIABETIC FOOT EXAM  12/10/2018   • DIABETIC EYE EXAM  05/03/2020   • HEMOGLOBIN A1C   05/27/2020   • LIPID PANEL  11/27/2020   • MAMMOGRAM  05/29/2021   • TDAP/TD VACCINES (2 - Td) 09/20/2022   • COLONOSCOPY  02/21/2029   • INFLUENZA VACCINE  Completed          The following portions of the patient's history were reviewed and updated as appropriate: allergies, current medications, past family history, past medical history, past social history, past surgical history and problem list.    Outpatient Medications Prior to Visit   Medication Sig Dispense Refill   • amLODIPine (NORVASC) 10 MG tablet      • aspirin 81 MG EC tablet Take 81 mg by mouth Daily.     • atorvastatin (LIPITOR) 10 MG tablet Take 1 tablet by mouth Daily. 90 tablet 1   • carisoprodol (SOMA) 350 MG tablet Take 350 mg by mouth Daily.     • celecoxib (CeleBREX) 200 MG capsule TAKE 1 CAPSULE TWICE A  capsule 4   • Cholecalciferol (VITAMIN D) 50 MCG (2000 UT) capsule Take  by mouth.     • CRANBERRY EXTRACT PO Take  by mouth.     • Cyanocobalamin (VITAMIN B-12 PO) Take  by mouth.     • esomeprazole (nexIUM) 40 MG capsule NEXIUM 40 MG CPDR     • ferrous gluconate 324 (37.5 Fe) MG tablet tablet Take  by mouth.     • FLUoxetine (PROzac) 20 MG capsule      • FOLIC ACID PO Take  by mouth.     • HYDROcodone-acetaminophen (NORCO)  MG per tablet Take 1 tbalet every 4-6 hours MAX 4 tablets per day 120 tablet 0   • ibuprofen (ADVIL,MOTRIN) 400 MG tablet Take 400 mg by mouth.     • metFORMIN (GLUCOPHAGE) 500 MG tablet      • Multiple Vitamin (ONE-A-DAY ESSENTIAL) tablet Take  by mouth.     • Omega-3 Fatty Acids (FISH OIL) 1200 MG capsule capsule Take  by mouth.     • pantoprazole (PROTONIX) 40 MG EC tablet      • quinapril (ACCUPRIL) 40 MG tablet      • phentermine (ADIPEX-P) 37.5 MG tablet TAKE 1 TABLET BY MOUTH EVERY DAY 30 tablet 0     No facility-administered medications prior to visit.        Patient Active Problem List   Diagnosis   • Asthmatic bronchitis   • Low back pain   • Gastro-esophageal reflux disease with esophagitis   •  "Hyperlipidemia   • Hypertension   • Osteoarthritis   • Overweight   • Type 2 diabetes mellitus (CMS/[facility])   • Medicare annual wellness visit, subsequent   • Vitamin D deficiency, unspecified        Advanced Care Planning:  Patient has an advance directive - a copy has not been provided. Have asked the patient to send this to us to add to record    Review of Systems    Compared to one year ago, the patient feels her physical health is the same.  Compared to one year ago, the patient feels her mental health is the same.    Reviewed chart for potential of high risk medication in the elderly: yes  Reviewed chart for potential of harmful drug interactions in the elderly:yes    Objective         Vitals:    11/27/19 0930   BP: 140/74   BP Location: Left arm   Patient Position: Sitting   Cuff Size: Adult   Pulse: 79   Resp: 16   Temp: 98 °F (36.7 °C)   TempSrc: Oral   SpO2: 95%   Weight: 89.7 kg (197 lb 12.8 oz)   Height: 167.6 cm (66\")       Body mass index is 31.93 kg/m².  Discussed the patient's BMI with her. The BMI is above average; BMI management plan is completed.    Physical Exam    Lab Results   Component Value Date    TRIG 92 11/27/2019    HDL 35 (L) 11/27/2019    LDL 49 11/27/2019    VLDL 18.4 11/27/2019    HGBA1C 5.5 11/27/2019        Assessment/Plan   Medicare Risks and Personalized Health Plan  CMS Preventative Services Quick Reference  Breast Cancer/Mammogram Screening  Chronic Pain   Colon Cancer Screening  Depression/Dysphoria  Diabetic Lab Screening     The above risks/problems have been discussed with the patient.  Pertinent information has been shared with the patient in the After Visit Summary.  Follow up plans and orders are seen below in the Assessment/Plan Section.    Diagnoses and all orders for this visit:    1. Mixed hyperlipidemia (Primary)  Assessment & Plan:  Lipid abnormalities are improving with treatment.  Nutritional counseling was provided. and Pharmacotherapy as ordered.  Lipids will be " reassessed in 6 months.    Patient has been given instruction on a low-carb diet and exercise.  Weight loss will be mandatory for her if she is going to get good control of her medical problems.  She is on atorvastatin 10 mg daily and she should continue this.  We will follow her lipid panel carefully    Orders:  -     Ferritin  -     Hemoglobin A1c  -     Lipid Panel  -     Comprehensive Metabolic Panel  -     CBC & Differential  -     Sedimentation Rate  -     Vitamin D 25 Hydroxy  -     Vitamin B12  -     Urinalysis With Culture If Indicated - Urine, Clean Catch  -     TSH  -     T4, Free    2. Essential hypertension  Assessment & Plan:  Hypertension is improving with treatment.  Continue current treatment regimen.  Weight loss.  Regular aerobic exercise.  Continue current medications.  Blood pressure will be reassessed at the next regular appointment.    Continue current medicines which includes Norvasc 10 mg daily and quinapril 40 mg daily.    Orders:  -     Ferritin  -     Hemoglobin A1c  -     Lipid Panel  -     Comprehensive Metabolic Panel  -     CBC & Differential  -     Sedimentation Rate  -     Vitamin D 25 Hydroxy  -     Vitamin B12  -     Urinalysis With Culture If Indicated - Urine, Clean Catch  -     TSH  -     T4, Free    3. Primary osteoarthritis involving multiple joints  -     Ferritin  -     Hemoglobin A1c  -     Lipid Panel  -     Comprehensive Metabolic Panel  -     CBC & Differential  -     Sedimentation Rate  -     Vitamin D 25 Hydroxy  -     Vitamin B12  -     Urinalysis With Culture If Indicated - Urine, Clean Catch  -     TSH  -     T4, Free    4. Medicare annual wellness visit, subsequent  Assessment & Plan:  Patient is up to date with exam, labs, mammo, colon and immunizations.  We will continue to follow closely and make sure she stays up-to-date with her screening but so far so good.      5. Type 2 diabetes mellitus without complication, without long-term current use of insulin  (CMS/[facility])  Assessment & Plan:  Diabetes is improving with treatment.   Continue current treatment regimen.  Reminded to bring in blood sugar diary at next visit.  Dietary recommendations for ADA diet.  Regular aerobic exercise.  Diabetes will be reassessed in 6 months.    Continue current medications and metformin 500 mg twice a day.  A1c is perfect at 5.5%    Orders:  -     Ferritin  -     Hemoglobin A1c  -     Lipid Panel  -     Comprehensive Metabolic Panel  -     CBC & Differential  -     Sedimentation Rate  -     Vitamin D 25 Hydroxy  -     Vitamin B12  -     Urinalysis With Culture If Indicated - Urine, Clean Catch  -     TSH  -     T4, Free    6. Overweight  Assessment & Plan:  Obesity is improving with treatment.  Discussed the patient's BMI.  The BMI is above average; BMI management plan is completed.  General weight loss/lifestyle modification strategies discussed (elicit support from others; identify saboteurs; non-food rewards, etc).  Behavioral treatment: commercial programs (Weight watchers program or HMR program are both options.  Currently she is doing pretty well with just low-carb diet and exercise).  Diet interventions: low calorie (1000 kCal/d) deficit diet.  Informal exercise measures discussed, e.g. taking stairs instead of elevator.  Regular aerobic exercise program discussed.     BMI is 30.  She needs to lose the weight so that her joints do not hurt so much and her diabetes will be easier to control.  Also her blood pressure is affected by the weight.    Orders:  -     Ferritin  -     Hemoglobin A1c  -     Lipid Panel  -     Comprehensive Metabolic Panel  -     CBC & Differential  -     Sedimentation Rate  -     Vitamin D 25 Hydroxy  -     Vitamin B12  -     Urinalysis With Culture If Indicated - Urine, Clean Catch  -     TSH  -     T4, Free    7. Vitamin D deficiency, unspecified   -     Vitamin D 25 Hydroxy    8. Gastro-esophageal reflux disease with esophagitis  Assessment &  Plan:  Patient has gastroesophageal reflux with esophagitis.  Very important that she controls the symptoms so that esophageal strictures and/or esophageal cancer is not a risk for her in the future.  She currently takes Protonix 40 mg daily and has the head of her bed raised up.  It would help if she would lose some weight.  Her symptoms have to be well controlled or want her to call us back.      9. Chronic bilateral low back pain without sciatica  Assessment & Plan:  Patient has chronic low back pain that comes and goes depending on her activity level.  Very seldom is her any sciatic component and if there is that she usually only to the buttock or upper thigh.  Never all the way down the leg.  Weight loss and keeping her core tight would be very helpful.  She needs to lose weight and tighten up her core muscles.      Other orders  -     CBC Auto Differential    Follow Up:  Return in about 6 months (around 5/27/2020), or if symptoms worsen or fail to improve, for Recheck.     An After Visit Summary and PPPS were given to the patient.

## 2019-11-30 PROBLEM — E55.9 VITAMIN D DEFICIENCY, UNSPECIFIED: Status: ACTIVE | Noted: 2019-11-30

## 2019-11-30 NOTE — PATIENT INSTRUCTIONS
Medicare Wellness  Personal Prevention Plan of Service     Date of Office Visit:  2019  Encounter Provider:  Abiodun Busch MD  Place of Service:  [facility]  Patient Name: Maylin Garcia  :  1945    As part of the Medicare Wellness portion of your visit today, we are providing you with this personalized preventive plan of services (PPPS). This plan is based upon recommendations of the [place] Preventive Services Task Force (USPSTF) and the Advisory Committee on Immunization Practices (ACIP).    This lists the preventive care services that should be considered, and provides dates of when you are due. Items listed as completed are up-to-date and do not require any further intervention.    Health Maintenance   Topic Date Due   • URINE MICROALBUMIN  1945   • ZOSTER VACCINE (1 of 2) 1997   • PNEUMOCOCCAL VACCINES (65+ LOW/MEDIUM RISK) (2 of 2 - PCV13) 2016   • HEPATITIS C SCREENING  12/10/2018   • DIABETIC FOOT EXAM  12/10/2018   • MEDICARE ANNUAL WELLNESS  10/31/2019   • DIABETIC EYE EXAM  2020   • HEMOGLOBIN A1C  2020   • LIPID PANEL  2020   • MAMMOGRAM  2021   • TDAP/TD VACCINES (2 - Td) 2022   • COLONOSCOPY  2029   • INFLUENZA VACCINE  Completed       Orders Placed This Encounter   Procedures   • Ferritin   • Hemoglobin A1c   • Lipid Panel   • Comprehensive Metabolic Panel   • Sedimentation Rate   • Vitamin D 25 Hydroxy   • Vitamin B12   • Urinalysis With Culture If Indicated - Urine, Clean Catch   • TSH   • T4, Free   • CBC Auto Differential   • CBC & Differential     Order Specific Question:   Manual Differential     Answer:   No       No Follow-up on file.          Carbohydrate Counting for Diabetes Mellitus, Adult    Carbohydrate counting is a method of keeping track of how many carbohydrates you eat. Eating carbohydrates naturally increases the amount of sugar (glucose) in the blood. Counting how  "many carbohydrates you eat helps keep your blood glucose within normal limits, which helps you manage your diabetes (diabetes mellitus).  It is important to know how many carbohydrates you can safely have in each meal. This is different for every person. A diet and nutrition specialist (registered dietitian) can help you make a meal plan and calculate how many carbohydrates you should have at each meal and snack.  Carbohydrates are found in the following foods:  · Grains, such as breads and cereals.  · Dried beans and soy products.  · Starchy vegetables, such as potatoes, peas, and corn.  · Fruit and fruit juices.  · Milk and yogurt.  · Sweets and snack foods, such as cake, cookies, candy, chips, and soft drinks.  How do I count carbohydrates?  There are two ways to count carbohydrates in food. You can use either of the methods or a combination of both.  Reading \"Nutrition Facts\" on packaged food  The \"Nutrition Facts\" list is included on the labels of almost all packaged foods and beverages in the [place] It includes:  · The serving size.  · Information about nutrients in each serving, including the grams (g) of carbohydrate per serving.  To use the “Nutrition Facts\":  · Decide how many servings you will have.  · Multiply the number of servings by the number of carbohydrates per serving.  · The resulting number is the total amount of carbohydrates that you will be having.  Learning standard serving sizes of other foods  When you eat carbohydrate foods that are not packaged or do not include \"Nutrition Facts\" on the label, you need to measure the servings in order to count the amount of carbohydrates:  · Measure the foods that you will eat with a food scale or measuring cup, if needed.  · Decide how many standard-size servings you will eat.  · Multiply the number of servings by 15. Most carbohydrate-rich foods have about 15 g of carbohydrates per serving.  ? For example, if you eat 8 oz (170 g) of strawberries, you " will have eaten 2 servings and 30 g of carbohydrates (2 servings x 15 g = 30 g).  · For foods that have more than one food mixed, such as soups and casseroles, you must count the carbohydrates in each food that is included.  The following list contains standard serving sizes of common carbohydrate-rich foods. Each of these servings has about 15 g of carbohydrates:  · ½ hamburger bun or ½ English muffin.  · ½ oz (15 mL) syrup.  · ½ oz (14 g) jelly.  · 1 slice of bread.  · 1 six-inch tortilla.  · 3 oz (85 g) cooked rice or pasta.  · 4 oz (113 g) cooked dried beans.  · 4 oz (113 g) starchy vegetable, such as peas, corn, or potatoes.  · 4 oz (113 g) hot cereal.  · 4 oz (113 g) mashed potatoes or ¼ of a large baked potato.  · 4 oz (113 g) canned or frozen fruit.  · 4 oz (120 mL) fruit juice.  · 4-6 crackers.  · 6 chicken nuggets.  · 6 oz (170 g) unsweetened dry cereal.  · 6 oz (170 g) plain fat-free yogurt or yogurt sweetened with artificial sweeteners.  · 8 oz (240 mL) milk.  · 8 oz (170 g) fresh fruit or one small piece of fruit.  · 24 oz (680 g) popped popcorn.  Example of carbohydrate counting  Sample meal  · 3 oz (85 g) chicken breast.  · 6 oz (170 g) brown rice.  · 4 oz (113 g) corn.  · 8 oz (240 mL) milk.  · 8 oz (170 g) strawberries with sugar-free whipped topping.  Carbohydrate calculation  1. Identify the foods that contain carbohydrates:  ? Rice.  ? Corn.  ? Milk.  ? Strawberries.  2. Calculate how many servings you have of each food:  ? 2 servings rice.  ? 1 serving corn.  ? 1 serving milk.  ? 1 serving strawberries.  3. Multiply each number of servings by 15 g:  ? 2 servings rice x 15 g = 30 g.  ? 1 serving corn x 15 g = 15 g.  ? 1 serving milk x 15 g = 15 g.  ? 1 serving strawberries x 15 g = 15 g.  4. Add together all of the amounts to find the total grams of carbohydrates eaten:  ? 30 g + 15 g + 15 g + 15 g = 75 g of carbohydrates total.  Summary  · Carbohydrate counting is a method of keeping track of  how many carbohydrates you eat.  · Eating carbohydrates naturally increases the amount of sugar (glucose) in the blood.  · Counting how many carbohydrates you eat helps keep your blood glucose within normal limits, which helps you manage your diabetes.  · A diet and nutrition specialist (registered dietitian) can help you make a meal plan and calculate how many carbohydrates you should have at each meal and snack.  This information is not intended to replace advice given to you by your health care provider. Make sure you discuss any questions you have with your health care provider.  Document Released: 12/18/2006 Document Revised: 06/27/2018 Document Reviewed: 05/31/2017  [employer] Interactive Patient Education © 2019 [employer]

## 2019-11-30 NOTE — ASSESSMENT & PLAN NOTE
Diabetes is improving with treatment.   Continue current treatment regimen.  Reminded to bring in blood sugar diary at next visit.  Dietary recommendations for ADA diet.  Regular aerobic exercise.  Diabetes will be reassessed in 6 months.    Continue current medications and metformin 500 mg twice a day.  A1c is perfect at 5.5%   16 year old M, resides with mom (dad is not in picture since 4 years of age), 8th grader currently at [employer], at [employer]-  past psychiatric history of sx of depression and ODD- as per pt 1 ER visit at [facility] [place] in 2018 - T&R'd, no history of inpatient psychiatric admissions, hx of outpatient therapy and Med Mx with Dr Claire at [facility] since 03/05/19 -, denies hx suicide attempts/nonsuicidal self-injury, no reported hx of aggression/violence, arrests, open ACS involvement, access to firearms, no past medical history, brought in by ambulance activated by [employer] accompanied by mom for evaluation in the context of school refusal and depression.    Pt reports that he missed school on Wednesday as he  was having diarrhea and next day his mother never woke him up as this is the rule made by  to make him responsible. Today [employer] called CPS and when CPS worker called pt, pt endorsed depressive sx with suicidal ideation and ambulance was called by CPS. Pt stated that "I never meant it, I never hurt my self in the past as well". He said that his mother took his cell phone away as he was not going to school for the past few days and  he has no alarm clock to wake him up. Pt said that he feels depressed but denied any SI/intent/plan. He denied any self injurious behavior. He added that he has many friends and "My friends makes me feel happy", he said that he has some friends from school and most from social media with whom he plays [employer] games. He said that sometimes he feels scared of going to school and feels that people are judging him.   He said that he was dx with depression by [employer] in 10/2018 when he started therapy for school refusal. He missed few days at school and started having anxiety going back to school. He was started on Paxil 10 mg po qd and now [employer] psychiatrists are in the process of adjusting it.   He said that if he feels upset he goes to his room and isolate himself.  He denied any SI/intent/plan/HI/perceptual disturbances/courtney.    Collaterals from parent: see  note    Pt has been refusing school and not getting up for school and as a rule made by therapist and psychiatrist if pt misses his school privileges will be taken away.

## 2019-11-30 NOTE — ASSESSMENT & PLAN NOTE
Patient has gastroesophageal reflux with esophagitis.  Very important that she controls the symptoms so that esophageal strictures and/or esophageal cancer is not a risk for her in the future.  She currently takes Protonix 40 mg daily and has the head of her bed raised up.  It would help if she would lose some weight.  Her symptoms have to be well controlled or want her to call us back.

## 2019-11-30 NOTE — ASSESSMENT & PLAN NOTE
Hypertension is improving with treatment.  Continue current treatment regimen.  Weight loss.  Regular aerobic exercise.  Continue current medications.  Blood pressure will be reassessed at the next regular appointment.    Continue current medicines which includes Norvasc 10 mg daily and quinapril 40 mg daily.

## 2019-11-30 NOTE — ASSESSMENT & PLAN NOTE
Patient has chronic low back pain that comes and goes depending on her activity level.  Very seldom is her any sciatic component and if there is that she usually only to the buttock or upper thigh.  Never all the way down the leg.  Weight loss and keeping her core tight would be very helpful.  She needs to lose weight and tighten up her core muscles.

## 2019-11-30 NOTE — ASSESSMENT & PLAN NOTE
Lipid abnormalities are improving with treatment.  Nutritional counseling was provided. and Pharmacotherapy as ordered.  Lipids will be reassessed in 6 months.    Patient has been given instruction on a low-carb diet and exercise.  Weight loss will be mandatory for her if she is going to get good control of her medical problems.  She is on atorvastatin 10 mg daily and she should continue this.  We will follow her lipid panel carefully

## 2019-11-30 NOTE — ASSESSMENT & PLAN NOTE
Obesity is improving with treatment.  Discussed the patient's BMI.  The BMI is above average; BMI management plan is completed.  General weight loss/lifestyle modification strategies discussed (elicit support from others; identify saboteurs; non-food rewards, etc).  Behavioral treatment: commercial programs (Weight watchers program or HMR program are both options.  Currently she is doing pretty well with just low-carb diet and exercise).  Diet interventions: low calorie (1000 kCal/d) deficit diet.  Informal exercise measures discussed, e.g. taking stairs instead of elevator.  Regular aerobic exercise program discussed.     BMI is 30.  She needs to lose the weight so that her joints do not hurt so much and her diabetes will be easier to control.  Also her blood pressure is affected by the weight.

## 2019-12-02 ENCOUNTER — TELEPHONE (OUTPATIENT)
Dept: FAMILY MEDICINE CLINIC | Facility: CLINIC | Age: 74
End: 2019-12-02

## 2019-12-02 NOTE — TELEPHONE ENCOUNTER
Pt noted on patient portal that her B12 level is elevated. She takes B12 supplement and she is asking if she needs to D/C it. Thank you.

## 2019-12-02 NOTE — TELEPHONE ENCOUNTER
Patient called back and wants our office to request her colonoscopy results from 2019 from Dr. Ned Garcia, Phone [phone], Fax: [phone]. Karla, can you please take care of this? Thank you.

## 2019-12-03 NOTE — TELEPHONE ENCOUNTER
Dr. SHEEHAN, patient still needs to know if she should stop the B12 supplement based on lab results. Thank you.

## 2019-12-20 RX ORDER — AMLODIPINE BESYLATE 10 MG/1
TABLET ORAL
Qty: 90 TABLET | Refills: 4 | Status: SHIPPED | OUTPATIENT
Start: 2019-12-20 | End: 2021-01-15 | Stop reason: SDUPTHER

## 2019-12-26 ENCOUNTER — TELEPHONE (OUTPATIENT)
Dept: FAMILY MEDICINE CLINIC | Facility: CLINIC | Age: 74
End: 2019-12-26

## 2019-12-26 DIAGNOSIS — E66.3 OVERWEIGHT: ICD-10-CM

## 2019-12-26 DIAGNOSIS — M19.91 PRIMARY OSTEOARTHRITIS, UNSPECIFIED SITE: Primary | ICD-10-CM

## 2019-12-26 RX ORDER — HYDROCODONE BITARTRATE AND ACETAMINOPHEN 10; 325 MG/1; MG/1
TABLET ORAL
Qty: 120 TABLET | Refills: 0 | Status: SHIPPED | OUTPATIENT
Start: 2019-12-26 | End: 2020-01-23 | Stop reason: SDUPTHER

## 2019-12-26 RX ORDER — PHENTERMINE HYDROCHLORIDE 37.5 MG/1
37.5 TABLET ORAL DAILY
Qty: 30 TABLET | Refills: 0 | Status: SHIPPED | OUTPATIENT
Start: 2019-12-26 | End: 2020-01-22

## 2019-12-26 NOTE — TELEPHONE ENCOUNTER
Needs rx's sent in for Hydrocodone 10/325mg takes one every 4-6 hours as needed #120 and Phentermine 37.5mg once daily #30.

## 2020-01-21 DIAGNOSIS — E66.3 OVERWEIGHT: ICD-10-CM

## 2020-01-22 RX ORDER — PHENTERMINE HYDROCHLORIDE 37.5 MG/1
37.5 TABLET ORAL DAILY
Qty: 30 TABLET | Refills: 2 | Status: SHIPPED | OUTPATIENT
Start: 2020-01-22 | End: 2020-02-21 | Stop reason: SDUPTHER

## 2020-01-23 DIAGNOSIS — M19.91 PRIMARY OSTEOARTHRITIS, UNSPECIFIED SITE: ICD-10-CM

## 2020-01-23 RX ORDER — HYDROCODONE BITARTRATE AND ACETAMINOPHEN 10; 325 MG/1; MG/1
TABLET ORAL
Qty: 120 TABLET | Refills: 0 | Status: SHIPPED | OUTPATIENT
Start: 2020-01-23 | End: 2020-02-21 | Stop reason: SDUPTHER

## 2020-02-21 ENCOUNTER — TELEPHONE (OUTPATIENT)
Dept: FAMILY MEDICINE CLINIC | Facility: CLINIC | Age: 75
End: 2020-02-21

## 2020-02-21 DIAGNOSIS — E66.3 OVERWEIGHT: ICD-10-CM

## 2020-02-21 DIAGNOSIS — M19.91 PRIMARY OSTEOARTHRITIS, UNSPECIFIED SITE: ICD-10-CM

## 2020-02-21 RX ORDER — PHENTERMINE HYDROCHLORIDE 37.5 MG/1
37.5 TABLET ORAL DAILY
Qty: 30 TABLET | Refills: 2 | Status: SHIPPED | OUTPATIENT
Start: 2020-02-21 | End: 2020-03-20 | Stop reason: SDUPTHER

## 2020-02-21 RX ORDER — HYDROCODONE BITARTRATE AND ACETAMINOPHEN 10; 325 MG/1; MG/1
TABLET ORAL
Qty: 120 TABLET | Refills: 0 | Status: SHIPPED | OUTPATIENT
Start: 2020-02-21 | End: 2020-03-20 | Stop reason: SDUPTHER

## 2020-02-21 NOTE — TELEPHONE ENCOUNTER
Pt requesting RF of following:    Hydrocodone 10-325mg 1 po qid  Phentermine 37.5mg 1 po qd    Thank you.

## 2020-03-20 DIAGNOSIS — M19.91 PRIMARY OSTEOARTHRITIS, UNSPECIFIED SITE: ICD-10-CM

## 2020-03-20 DIAGNOSIS — E66.3 OVERWEIGHT: ICD-10-CM

## 2020-03-20 NOTE — TELEPHONE ENCOUNTER
PT REQUESTED REFILLS FOR HYDROcodone-acetaminophen (NORCO)  MG per tablet  phentermine (ADIPEX-P) 37.5 MG tablet    PT CALLBACK [phone]    PHARMACY CONFIRMED

## 2020-03-21 RX ORDER — PHENTERMINE HYDROCHLORIDE 37.5 MG/1
37.5 TABLET ORAL DAILY
Qty: 30 TABLET | Refills: 2 | Status: SHIPPED | OUTPATIENT
Start: 2020-03-21 | End: 2020-04-21 | Stop reason: SDUPTHER

## 2020-03-21 RX ORDER — HYDROCODONE BITARTRATE AND ACETAMINOPHEN 10; 325 MG/1; MG/1
TABLET ORAL
Qty: 120 TABLET | Refills: 0 | Status: SHIPPED | OUTPATIENT
Start: 2020-03-21 | End: 2020-04-21 | Stop reason: SDUPTHER

## 2020-04-01 RX ORDER — QUINAPRIL 40 MG/1
40 TABLET ORAL DAILY
Qty: 90 TABLET | Refills: 1 | Status: SHIPPED | OUTPATIENT
Start: 2020-04-01 | End: 2020-04-06 | Stop reason: SDUPTHER

## 2020-04-01 NOTE — TELEPHONE ENCOUNTER
PATIENT STATES THAT SHE NEEDS A REFILL ON THE FOLLOWING quinapril (ACCUPRIL) 40 MG tablet    PLEASE SEND RX TO [facility] #[id number] - [place], [place] - [address] - [phone]  - [phone] FX

## 2020-04-06 RX ORDER — FLUOXETINE HYDROCHLORIDE 20 MG/1
CAPSULE ORAL
Qty: 90 CAPSULE | Refills: 3 | Status: SHIPPED | OUTPATIENT
Start: 2020-04-06 | End: 2021-01-15 | Stop reason: SDUPTHER

## 2020-04-06 RX ORDER — QUINAPRIL 40 MG/1
40 TABLET ORAL DAILY
Qty: 90 TABLET | Refills: 1 | Status: SHIPPED | OUTPATIENT
Start: 2020-04-06 | End: 2020-04-23

## 2020-04-06 RX ORDER — ATORVASTATIN CALCIUM 10 MG/1
10 TABLET, FILM COATED ORAL DAILY
Qty: 90 TABLET | Refills: 1 | Status: SHIPPED | OUTPATIENT
Start: 2020-04-06 | End: 2020-09-16

## 2020-04-06 NOTE — TELEPHONE ENCOUNTER
PT IS CALLING IN FOR REFILLS TO BE SENT TO [facility]    quinapril (ACCUPRIL) 40 MG tablet    AND atorvastatin (LIPITOR) 10 MG tablet

## 2020-04-21 DIAGNOSIS — E66.3 OVERWEIGHT: ICD-10-CM

## 2020-04-21 DIAGNOSIS — M19.91 PRIMARY OSTEOARTHRITIS, UNSPECIFIED SITE: ICD-10-CM

## 2020-04-21 RX ORDER — PHENTERMINE HYDROCHLORIDE 37.5 MG/1
37.5 TABLET ORAL DAILY
Qty: 30 TABLET | Refills: 2 | Status: SHIPPED | OUTPATIENT
Start: 2020-04-21 | End: 2020-05-14 | Stop reason: SDUPTHER

## 2020-04-21 RX ORDER — HYDROCODONE BITARTRATE AND ACETAMINOPHEN 10; 325 MG/1; MG/1
TABLET ORAL
Qty: 120 TABLET | Refills: 0 | Status: SHIPPED | OUTPATIENT
Start: 2020-04-21 | End: 2020-05-20 | Stop reason: SDUPTHER

## 2020-04-23 RX ORDER — QUINAPRIL 40 MG/1
TABLET ORAL
Qty: 180 TABLET | Refills: 3 | Status: SHIPPED | OUTPATIENT
Start: 2020-04-23 | End: 2020-07-20 | Stop reason: SDUPTHER

## 2020-05-14 DIAGNOSIS — E66.3 OVERWEIGHT: ICD-10-CM

## 2020-05-15 RX ORDER — PHENTERMINE HYDROCHLORIDE 37.5 MG/1
37.5 TABLET ORAL DAILY
Qty: 90 TABLET | Refills: 0 | Status: SHIPPED | OUTPATIENT
Start: 2020-05-15 | End: 2020-06-19 | Stop reason: SDUPTHER

## 2020-05-20 DIAGNOSIS — M19.91 PRIMARY OSTEOARTHRITIS, UNSPECIFIED SITE: ICD-10-CM

## 2020-05-20 NOTE — TELEPHONE ENCOUNTER
PATIENT CALLED REQUESTING A REFILL ON HER HYDROcodone-acetaminophen (NORCO)  MG per tablet. I CONFIRMED THE [facility] [address]

## 2020-05-21 RX ORDER — HYDROCODONE BITARTRATE AND ACETAMINOPHEN 10; 325 MG/1; MG/1
TABLET ORAL
Qty: 120 TABLET | Refills: 0 | Status: SHIPPED | OUTPATIENT
Start: 2020-05-21 | End: 2020-06-19 | Stop reason: SDUPTHER

## 2020-06-15 ENCOUNTER — TELEPHONE (OUTPATIENT)
Dept: FAMILY MEDICINE CLINIC | Facility: CLINIC | Age: 75
End: 2020-06-15

## 2020-06-15 DIAGNOSIS — M54.50 CHRONIC BILATERAL LOW BACK PAIN WITHOUT SCIATICA: Primary | ICD-10-CM

## 2020-06-15 DIAGNOSIS — G89.29 CHRONIC BILATERAL LOW BACK PAIN WITHOUT SCIATICA: Primary | ICD-10-CM

## 2020-06-15 RX ORDER — CARISOPRODOL 350 MG/1
350 TABLET ORAL 3 TIMES DAILY PRN
Qty: 30 TABLET | Refills: 3 | Status: SHIPPED | OUTPATIENT
Start: 2020-06-15 | End: 2020-11-12 | Stop reason: SDUPTHER

## 2020-06-15 NOTE — TELEPHONE ENCOUNTER
----- Message from Maylin Garcia sent at 6/14/2020 10:57 PM EDT -----  Regarding: Prescription Question  Contact: [phone]  Good Morning Dr. Busch,  Would it be possible to get a refill on a Soma Rx for me. as needed? I think it's been a year, at least,  since I've had a prescription, but I do find them helpful, on occasion, when I have back spasms, and I have finally run out. I probably only take 2 or 3 a week after I've overdone exercise or housework. Say hello to Maris for me!  Maylin Garcia

## 2020-06-19 DIAGNOSIS — E66.3 OVERWEIGHT: ICD-10-CM

## 2020-06-19 DIAGNOSIS — M19.91 PRIMARY OSTEOARTHRITIS, UNSPECIFIED SITE: ICD-10-CM

## 2020-06-20 RX ORDER — HYDROCODONE BITARTRATE AND ACETAMINOPHEN 10; 325 MG/1; MG/1
TABLET ORAL
Qty: 120 TABLET | Refills: 0 | Status: SHIPPED | OUTPATIENT
Start: 2020-06-20 | End: 2020-07-20 | Stop reason: SDUPTHER

## 2020-06-20 RX ORDER — PHENTERMINE HYDROCHLORIDE 37.5 MG/1
37.5 TABLET ORAL DAILY
Qty: 90 TABLET | Refills: 0 | Status: SHIPPED | OUTPATIENT
Start: 2020-06-20 | End: 2020-10-21

## 2020-07-06 DIAGNOSIS — G89.29 CHRONIC BILATERAL LOW BACK PAIN WITHOUT SCIATICA: Primary | ICD-10-CM

## 2020-07-06 DIAGNOSIS — M25.552 LEFT HIP PAIN: ICD-10-CM

## 2020-07-06 DIAGNOSIS — M54.50 CHRONIC BILATERAL LOW BACK PAIN WITHOUT SCIATICA: Primary | ICD-10-CM

## 2020-07-10 ENCOUNTER — TELEPHONE (OUTPATIENT)
Dept: FAMILY MEDICINE CLINIC | Facility: CLINIC | Age: 75
End: 2020-07-10

## 2020-07-10 DIAGNOSIS — M54.50 CHRONIC BILATERAL LOW BACK PAIN WITHOUT SCIATICA: Primary | ICD-10-CM

## 2020-07-10 DIAGNOSIS — M25.552 LEFT HIP PAIN: ICD-10-CM

## 2020-07-10 DIAGNOSIS — M54.50 CHRONIC BILATERAL LOW BACK PAIN WITHOUT SCIATICA: ICD-10-CM

## 2020-07-10 DIAGNOSIS — G89.29 CHRONIC BILATERAL LOW BACK PAIN WITHOUT SCIATICA: ICD-10-CM

## 2020-07-10 DIAGNOSIS — M16.12 PRIMARY OSTEOARTHRITIS OF LEFT HIP: ICD-10-CM

## 2020-07-10 DIAGNOSIS — G89.29 CHRONIC BILATERAL LOW BACK PAIN WITHOUT SCIATICA: Primary | ICD-10-CM

## 2020-07-10 NOTE — TELEPHONE ENCOUNTER
Patient called in wanting to know if the office received the results from the imaging she did? [facility] have sent it to the office.    Please call back to advise @ [phone]

## 2020-07-11 DIAGNOSIS — M16.12 PRIMARY OSTEOARTHRITIS OF LEFT HIP: Primary | ICD-10-CM

## 2020-07-11 DIAGNOSIS — M25.552 LEFT HIP PAIN: ICD-10-CM

## 2020-07-20 DIAGNOSIS — M19.91 PRIMARY OSTEOARTHRITIS, UNSPECIFIED SITE: ICD-10-CM

## 2020-07-22 NOTE — TELEPHONE ENCOUNTER
Patient called about these rx's.  The Hydrocodone needs to be sent to [facility] at  and the Accupril needs to go to [facility].

## 2020-07-23 ENCOUNTER — TELEPHONE (OUTPATIENT)
Dept: FAMILY MEDICINE CLINIC | Facility: CLINIC | Age: 75
End: 2020-07-23

## 2020-07-23 RX ORDER — QUINAPRIL 40 MG/1
40 TABLET ORAL 2 TIMES DAILY
Qty: 180 TABLET | Refills: 3 | Status: SHIPPED | OUTPATIENT
Start: 2020-07-23 | End: 2020-10-04

## 2020-07-23 RX ORDER — HYDROCODONE BITARTRATE AND ACETAMINOPHEN 10; 325 MG/1; MG/1
TABLET ORAL
Qty: 120 TABLET | Refills: 0 | Status: SHIPPED | OUTPATIENT
Start: 2020-07-23 | End: 2020-08-20 | Stop reason: SDUPTHER

## 2020-07-23 NOTE — TELEPHONE ENCOUNTER
PT CALLED ASKING TO TALK TO MUSA REGARDING THE PRESCRIPTION THAT WAS SUPPOSED TO BE SENT TO [facility]. SHE STATES SHE CAN SEE THAT IT WAS APPROVED, BUT [facility]. SHE WANTED TO CONFIRM THAT THE HYDROCODONE WENT TO [facility] AND THE OTHER PRESCRIPTION TO [facility].    PLEASE ADVISE.    CALLBACK NUMBER: [phone]

## 2020-08-20 DIAGNOSIS — M19.91 PRIMARY OSTEOARTHRITIS, UNSPECIFIED SITE: ICD-10-CM

## 2020-08-20 RX ORDER — HYDROCODONE BITARTRATE AND ACETAMINOPHEN 10; 325 MG/1; MG/1
TABLET ORAL
Qty: 120 TABLET | Refills: 0 | Status: SHIPPED | OUTPATIENT
Start: 2020-08-20 | End: 2020-09-18 | Stop reason: SDUPTHER

## 2020-08-20 NOTE — TELEPHONE ENCOUNTER
Caller: Maylin Garcia    Relationship: Self    Best call back number: [phone]    Medication needed:   Requested Prescriptions     Pending Prescriptions Disp Refills   • HYDROcodone-acetaminophen (NORCO)  MG per tablet 120 tablet 0     Sig: Take 1 tbalet every 4-6 hours MAX 4 tablets per day           Does the patient have less than a 3 day supply:  [x] Yes  [] No    What is the patient's preferred pharmacy: [facility] #[id number] - [place], [place] - [address] - [phone]  - [phone] FX

## 2020-09-03 ENCOUNTER — TRANSCRIBE ORDERS (OUTPATIENT)
Dept: ADMINISTRATIVE | Facility: HOSPITAL | Age: 75
End: 2020-09-03

## 2020-09-03 DIAGNOSIS — M47.816 LUMBAR SPONDYLOSIS: Primary | ICD-10-CM

## 2020-09-13 ENCOUNTER — HOSPITAL ENCOUNTER (OUTPATIENT)
Dept: MRI IMAGING | Facility: HOSPITAL | Age: 75
Discharge: HOME | End: 2020-09-13
Admitting: ANESTHESIOLOGY

## 2020-09-13 DIAGNOSIS — M47.816 LUMBAR SPONDYLOSIS: ICD-10-CM

## 2020-09-13 PROCEDURE — 72148 MRI LUMBAR SPINE W/O DYE: CPT

## 2020-09-16 RX ORDER — ATORVASTATIN CALCIUM 10 MG/1
TABLET, FILM COATED ORAL
Qty: 90 TABLET | Refills: 3 | Status: SHIPPED | OUTPATIENT
Start: 2020-09-16 | End: 2021-01-15 | Stop reason: SDUPTHER

## 2020-09-18 DIAGNOSIS — M19.91 PRIMARY OSTEOARTHRITIS, UNSPECIFIED SITE: ICD-10-CM

## 2020-09-18 NOTE — TELEPHONE ENCOUNTER
Caller: Maylin Garcia    Relationship: Self    Best call back number: [phone]      Medication needed:   Requested Prescriptions     Pending Prescriptions Disp Refills   • HYDROcodone-acetaminophen (NORCO)  MG per tablet 120 tablet 0     Sig: Take 1 tbalet every 4-6 hours MAX 4 tablets per day         Does the patient have less than a 3 day supply:  [] Yes  [x] No    What is the patient's preferred pharmacy:      [facility] #[id number] - [place], [place] - [address] - [phone]  - [phone]   [phone]

## 2020-09-20 RX ORDER — HYDROCODONE BITARTRATE AND ACETAMINOPHEN 10; 325 MG/1; MG/1
TABLET ORAL
Qty: 120 TABLET | Refills: 0 | Status: SHIPPED | OUTPATIENT
Start: 2020-09-20 | End: 2020-10-19 | Stop reason: SDUPTHER

## 2020-10-04 RX ORDER — QUINAPRIL 40 MG/1
TABLET ORAL
Qty: 90 TABLET | Refills: 2 | Status: SHIPPED | OUTPATIENT
Start: 2020-10-04 | End: 2021-01-15 | Stop reason: SDUPTHER

## 2020-10-15 NOTE — PROGRESS NOTES
I will call Maylin and discussed with her the possibility of a orthopedic surgeon evaluation for hip replacement.  She might also benefit from pain medicine doing facet blocks on her lower back. Left voicemail message for patient to call clinic to schedule an appointment.

## 2020-10-19 DIAGNOSIS — M19.91 PRIMARY OSTEOARTHRITIS, UNSPECIFIED SITE: ICD-10-CM

## 2020-10-19 RX ORDER — HYDROCODONE BITARTRATE AND ACETAMINOPHEN 10; 325 MG/1; MG/1
TABLET ORAL
Qty: 120 TABLET | Refills: 0 | Status: SHIPPED | OUTPATIENT
Start: 2020-10-19 | End: 2020-11-18 | Stop reason: SDUPTHER

## 2020-10-19 NOTE — TELEPHONE ENCOUNTER
Caller: Maylin Garcia    Relationship: Self    Best call back number: [phone]    Medication needed:   Requested Prescriptions     Pending Prescriptions Disp Refills   • HYDROcodone-acetaminophen (NORCO)  MG per tablet 120 tablet 0     Sig: Take 1 tbalet every 4-6 hours MAX 4 tablets per day   ALSO REQUESTING phentermine (ADIPEX-P) 37.5 MG tablet    What details did the patient provide when requesting the medication: PATIENT HAS A FEW DOSES LEFT    Does the patient have less than a 3 day supply:  [x] Yes  [] No    What is the patient's preferred pharmacy: [facility] #[id number] - [place], [place] - [address] - [phone] [facility] [phone] FX

## 2020-10-20 DIAGNOSIS — E66.3 OVERWEIGHT: ICD-10-CM

## 2020-10-21 RX ORDER — PHENTERMINE HYDROCHLORIDE 37.5 MG/1
37.5 TABLET ORAL DAILY
Qty: 30 TABLET | Refills: 3 | Status: SHIPPED | OUTPATIENT
Start: 2020-10-21 | End: 2021-01-15 | Stop reason: SDUPTHER

## 2020-11-12 ENCOUNTER — PATIENT MESSAGE (OUTPATIENT)
Dept: FAMILY MEDICINE CLINIC | Facility: CLINIC | Age: 75
End: 2020-11-12

## 2020-11-12 ENCOUNTER — TELEPHONE (OUTPATIENT)
Dept: FAMILY MEDICINE CLINIC | Facility: CLINIC | Age: 75
End: 2020-11-12

## 2020-11-12 DIAGNOSIS — M54.50 CHRONIC BILATERAL LOW BACK PAIN WITHOUT SCIATICA: ICD-10-CM

## 2020-11-12 DIAGNOSIS — G89.29 CHRONIC BILATERAL LOW BACK PAIN WITHOUT SCIATICA: ICD-10-CM

## 2020-11-12 RX ORDER — HEPATITIS A VACCINE 1440 [IU]/ML
INJECTION, SUSPENSION INTRAMUSCULAR
COMMUNITY
End: 2021-02-10

## 2020-11-12 RX ORDER — CARISOPRODOL 350 MG/1
350 TABLET ORAL 3 TIMES DAILY PRN
Qty: 60 TABLET | Refills: 3 | Status: SHIPPED | OUTPATIENT
Start: 2020-11-12 | End: 2020-12-03

## 2020-11-12 RX ORDER — INFLUENZA A VIRUS A/SINGAPORE/GP1908/2015 IVR-180A (H1N1) ANTIGEN (PROPIOLACTONE INACTIVATED), INFLUENZA A VIRUS A/SINGAPORE/INFIMH-16-0019/2016 IVR-186 (H3N2) ANTIGEN (PROPIOLACTONE INACTIVATED), INFLUENZA B VIRUS B/MARYLAND/15/2016 ANTIGEN (PROPIOLACTONE INACTIVATED), AND INFLUENZA B VIRUS B/PHUKET/3073/2013 BVR-1B ANTIGEN (PROPIOLACTONE INACTIVATED) 15; 15; 15; 15 UG/.5ML; UG/.5ML; UG/.5ML; UG/.5ML
INJECTION, SUSPENSION INTRAMUSCULAR
COMMUNITY
End: 2021-02-10

## 2020-11-12 RX ORDER — CEPHALEXIN 500 MG/1
CAPSULE ORAL
COMMUNITY
End: 2021-02-10

## 2020-11-12 RX ORDER — ZOSTER VACCINE RECOMBINANT, ADJUVANTED 50 MCG/0.5
KIT INTRAMUSCULAR
COMMUNITY
End: 2021-02-10

## 2020-11-12 NOTE — TELEPHONE ENCOUNTER
----- Message from Maylin Garcia sent at 11/12/2020  1:55 AM EST -----  Regarding: Prescription Question  Contact: [phone]  Dear Dr. Busch,  I am requesting that you write me an rx for soma. I only take one every few days when I stiffen up real bad from my spinal stenosis. I think the last time I requested it was several months  ago.  Thanks.  Maylin Garcia

## 2020-11-18 DIAGNOSIS — M19.91 PRIMARY OSTEOARTHRITIS, UNSPECIFIED SITE: ICD-10-CM

## 2020-11-18 NOTE — TELEPHONE ENCOUNTER
Caller: Maylin Garcia    Relationship: Self    Best call back number:[phone]   Medication needed:   Requested Prescriptions     Pending Prescriptions Disp Refills   • HYDROcodone-acetaminophen (NORCO)  MG per tablet 120 tablet 0     Sig: Take 1 tbalet every 4-6 hours MAX 4 tablets per day       When do you need the refill by: asap      Does the patient have less than a 3 day supply:  [x] Yes  [] No    What is the patient's preferred pharmacy: [facility] #[id number] - [place], [place] - [address] - [phone]  - [phone] FX

## 2020-11-19 RX ORDER — HYDROCODONE BITARTRATE AND ACETAMINOPHEN 10; 325 MG/1; MG/1
TABLET ORAL
Qty: 120 TABLET | Refills: 0 | Status: SHIPPED | OUTPATIENT
Start: 2020-11-19 | End: 2020-12-17 | Stop reason: SDUPTHER

## 2020-12-17 DIAGNOSIS — M19.91 PRIMARY OSTEOARTHRITIS, UNSPECIFIED SITE: ICD-10-CM

## 2020-12-17 NOTE — TELEPHONE ENCOUNTER
Caller: Maylin Garcia    Relationship: Self    Best call back number: [phone]    Medication needed:   Requested Prescriptions     Pending Prescriptions Disp Refills   • HYDROcodone-acetaminophen (NORCO)  MG per tablet 120 tablet 0     Sig: Take 1 tbalet every 4-6 hours MAX 4 tablets per day       When do you need the refill by: ASAP    What details did the patient provide when requesting the medication: PATIENT HAS 1-2 TABLETS LEFT    Does the patient have less than a 3 day supply:  [x] Yes  [] No    What is the patient's preferred pharmacy: [facility] #[id number] - [place], [place] - [address] - [phone] [facility] [phone] FX

## 2020-12-18 RX ORDER — HYDROCODONE BITARTRATE AND ACETAMINOPHEN 10; 325 MG/1; MG/1
TABLET ORAL
Qty: 120 TABLET | Refills: 0 | Status: SHIPPED | OUTPATIENT
Start: 2020-12-18 | End: 2021-01-15 | Stop reason: SDUPTHER

## 2020-12-29 ENCOUNTER — TELEPHONE (OUTPATIENT)
Dept: FAMILY MEDICINE CLINIC | Facility: CLINIC | Age: 75
End: 2020-12-29

## 2020-12-29 NOTE — TELEPHONE ENCOUNTER
"Patient put in the following message:    \"Hello,  I put in a request yesterday to have an appointment with Dr. Busch. However, I'm feeling much better today and my symptoms have eased,. So. I'd like to cancel my request for now and will call back if I start FEELING UNWELL AGAIN.  THANKS,  Maylin Garcia\"      Nothing needed at this time.    "

## 2020-12-29 NOTE — TELEPHONE ENCOUNTER
"Received the following [employer] message:    \"Comments:  Dear Dr. Busch,  For the past week, I have had burning of my entire tongue sensation, sore mouth and gums, and just today, pain along my jawline. Not sure whether to make an appointment with you or my dentist. Can you please advise? \"      What do you recommend?  "

## 2021-01-01 RX ORDER — FLUCONAZOLE 200 MG/1
TABLET ORAL
Qty: 4 TABLET | Refills: 0 | Status: SHIPPED | OUTPATIENT
Start: 2021-01-01 | End: 2021-02-10

## 2021-01-15 DIAGNOSIS — E66.3 OVERWEIGHT: ICD-10-CM

## 2021-01-15 DIAGNOSIS — M19.91 PRIMARY OSTEOARTHRITIS, UNSPECIFIED SITE: ICD-10-CM

## 2021-01-18 DIAGNOSIS — M19.91 PRIMARY OSTEOARTHRITIS, UNSPECIFIED SITE: ICD-10-CM

## 2021-01-18 RX ORDER — CELECOXIB 200 MG/1
200 CAPSULE ORAL 2 TIMES DAILY
Qty: 180 CAPSULE | Refills: 4 | Status: SHIPPED | OUTPATIENT
Start: 2021-01-18 | End: 2021-08-26

## 2021-01-18 RX ORDER — PHENTERMINE HYDROCHLORIDE 37.5 MG/1
37.5 TABLET ORAL DAILY
Qty: 30 TABLET | Refills: 3 | Status: SHIPPED | OUTPATIENT
Start: 2021-01-18 | End: 2021-02-21 | Stop reason: SDUPTHER

## 2021-01-18 RX ORDER — ATORVASTATIN CALCIUM 10 MG/1
10 TABLET, FILM COATED ORAL DAILY
Qty: 90 TABLET | Refills: 3 | Status: SHIPPED | OUTPATIENT
Start: 2021-01-18 | End: 2021-05-25 | Stop reason: ALTCHOICE

## 2021-01-18 RX ORDER — FLUOXETINE HYDROCHLORIDE 20 MG/1
20 CAPSULE ORAL DAILY
Qty: 90 CAPSULE | Refills: 3 | Status: SHIPPED | OUTPATIENT
Start: 2021-01-18 | End: 2022-03-04

## 2021-01-18 RX ORDER — HYDROCODONE BITARTRATE AND ACETAMINOPHEN 10; 325 MG/1; MG/1
TABLET ORAL
Qty: 120 TABLET | Refills: 0 | Status: SHIPPED | OUTPATIENT
Start: 2021-01-18 | End: 2021-01-18 | Stop reason: SDUPTHER

## 2021-01-18 RX ORDER — QUINAPRIL 40 MG/1
40 TABLET ORAL DAILY
Qty: 90 TABLET | Refills: 2 | Status: SHIPPED | OUTPATIENT
Start: 2021-01-18 | End: 2021-05-20

## 2021-01-18 RX ORDER — AMLODIPINE BESYLATE 10 MG/1
10 TABLET ORAL DAILY
Qty: 90 TABLET | Refills: 4 | Status: SHIPPED | OUTPATIENT
Start: 2021-01-18 | End: 2022-01-28

## 2021-01-20 RX ORDER — HYDROCODONE BITARTRATE AND ACETAMINOPHEN 10; 325 MG/1; MG/1
TABLET ORAL
Qty: 120 TABLET | Refills: 0 | Status: SHIPPED | OUTPATIENT
Start: 2021-01-20 | End: 2021-02-08 | Stop reason: SDUPTHER

## 2021-01-20 RX ORDER — PANTOPRAZOLE SODIUM 40 MG/1
40 TABLET, DELAYED RELEASE ORAL DAILY
Qty: 90 TABLET | Refills: 1 | Status: SHIPPED | OUTPATIENT
Start: 2021-01-20 | End: 2021-10-07

## 2021-02-08 DIAGNOSIS — M19.91 PRIMARY OSTEOARTHRITIS, UNSPECIFIED SITE: ICD-10-CM

## 2021-02-08 RX ORDER — HYDROCODONE BITARTRATE AND ACETAMINOPHEN 10; 325 MG/1; MG/1
TABLET ORAL
Qty: 360 TABLET | Refills: 0 | Status: SHIPPED | OUTPATIENT
Start: 2021-02-08 | End: 2021-05-06 | Stop reason: SDUPTHER

## 2021-02-10 ENCOUNTER — LAB (OUTPATIENT)
Dept: FAMILY MEDICINE CLINIC | Facility: CLINIC | Age: 76
End: 2021-02-10

## 2021-02-10 ENCOUNTER — OFFICE VISIT (OUTPATIENT)
Dept: FAMILY MEDICINE CLINIC | Facility: CLINIC | Age: 76
End: 2021-02-10

## 2021-02-10 VITALS
OXYGEN SATURATION: 99 % | BODY MASS INDEX: 32.47 KG/M2 | SYSTOLIC BLOOD PRESSURE: 166 MMHG | DIASTOLIC BLOOD PRESSURE: 75 MMHG | WEIGHT: 202 LBS | HEART RATE: 100 BPM | RESPIRATION RATE: 20 BRPM | TEMPERATURE: 97 F | HEIGHT: 66 IN

## 2021-02-10 DIAGNOSIS — E11.9 TYPE 2 DIABETES MELLITUS WITHOUT COMPLICATION, WITHOUT LONG-TERM CURRENT USE OF INSULIN (HCC): ICD-10-CM

## 2021-02-10 DIAGNOSIS — E55.9 VITAMIN D DEFICIENCY, UNSPECIFIED: ICD-10-CM

## 2021-02-10 DIAGNOSIS — E66.3 OVERWEIGHT: ICD-10-CM

## 2021-02-10 DIAGNOSIS — Z00.00 MEDICARE ANNUAL WELLNESS VISIT, SUBSEQUENT: Primary | ICD-10-CM

## 2021-02-10 DIAGNOSIS — K21.00 GASTROESOPHAGEAL REFLUX DISEASE WITH ESOPHAGITIS WITHOUT HEMORRHAGE: ICD-10-CM

## 2021-02-10 DIAGNOSIS — I10 ESSENTIAL HYPERTENSION: ICD-10-CM

## 2021-02-10 DIAGNOSIS — E78.2 MIXED HYPERLIPIDEMIA: ICD-10-CM

## 2021-02-10 LAB
25(OH)D3 SERPL-MCNC: 53.2 NG/ML (ref 30–100)
ALBUMIN SERPL-MCNC: 5 G/DL (ref 3.5–5.2)
ALBUMIN/GLOB SERPL: 1.9 G/DL
ALP SERPL-CCNC: 75 U/L (ref 39–117)
ALT SERPL W P-5'-P-CCNC: 30 U/L (ref 1–33)
ANION GAP SERPL CALCULATED.3IONS-SCNC: 15 MMOL/L (ref 5–15)
AST SERPL-CCNC: 23 U/L (ref 1–32)
BACTERIA UR QL AUTO: ABNORMAL /HPF
BASOPHILS # BLD AUTO: 0.1 10*3/MM3 (ref 0–0.2)
BASOPHILS NFR BLD AUTO: 0.7 % (ref 0–1.5)
BILIRUB SERPL-MCNC: 0.6 MG/DL (ref 0–1.2)
BILIRUB UR QL STRIP: NEGATIVE
BUN SERPL-MCNC: 21 MG/DL (ref 8–23)
BUN/CREAT SERPL: 20.8 (ref 7–25)
CALCIUM SPEC-SCNC: 11 MG/DL (ref 8.6–10.5)
CHLORIDE SERPL-SCNC: 101 MMOL/L (ref 98–107)
CHOLEST SERPL-MCNC: 142 MG/DL (ref 0–200)
CLARITY UR: ABNORMAL
CO2 SERPL-SCNC: 26 MMOL/L (ref 22–29)
COD CRY URNS QL: ABNORMAL /HPF
COLOR UR: YELLOW
CREAT SERPL-MCNC: 1.01 MG/DL (ref 0.57–1)
DEPRECATED RDW RBC AUTO: 44.2 FL (ref 37–54)
EOSINOPHIL # BLD AUTO: 0.1 10*3/MM3 (ref 0–0.4)
EOSINOPHIL NFR BLD AUTO: 1.4 % (ref 0.3–6.2)
ERYTHROCYTE [DISTWIDTH] IN BLOOD BY AUTOMATED COUNT: 13.4 % (ref 12.3–15.4)
GFR SERPL CREATININE-BSD FRML MDRD: 54 ML/MIN/1.73
GLOBULIN UR ELPH-MCNC: 2.6 GM/DL
GLUCOSE SERPL-MCNC: 103 MG/DL (ref 65–99)
GLUCOSE UR STRIP-MCNC: NEGATIVE MG/DL
HBA1C MFR BLD: 5.7 % (ref 3.5–5.6)
HCT VFR BLD AUTO: 41.3 % (ref 34–46.6)
HDLC SERPL-MCNC: 42 MG/DL (ref 40–60)
HGB BLD-MCNC: 14 G/DL (ref 12–15.9)
HGB UR QL STRIP.AUTO: NEGATIVE
HYALINE CASTS UR QL AUTO: ABNORMAL /LPF
KETONES UR QL STRIP: ABNORMAL
LDLC SERPL CALC-MCNC: 69 MG/DL (ref 0–100)
LDLC/HDLC SERPL: 1.5 {RATIO}
LEUKOCYTE ESTERASE UR QL STRIP.AUTO: ABNORMAL
LYMPHOCYTES # BLD AUTO: 1.5 10*3/MM3 (ref 0.7–3.1)
LYMPHOCYTES NFR BLD AUTO: 20 % (ref 19.6–45.3)
MCH RBC QN AUTO: 32 PG (ref 26.6–33)
MCHC RBC AUTO-ENTMCNC: 34 G/DL (ref 31.5–35.7)
MCV RBC AUTO: 94.1 FL (ref 79–97)
MONOCYTES # BLD AUTO: 0.6 10*3/MM3 (ref 0.1–0.9)
MONOCYTES NFR BLD AUTO: 7.2 % (ref 5–12)
NEUTROPHILS NFR BLD AUTO: 5.4 10*3/MM3 (ref 1.7–7)
NEUTROPHILS NFR BLD AUTO: 70.7 % (ref 42.7–76)
NITRITE UR QL STRIP: NEGATIVE
NRBC BLD AUTO-RTO: 0 /100 WBC (ref 0–0.2)
PH UR STRIP.AUTO: 5.5 [PH] (ref 5–8)
PLATELET # BLD AUTO: 252 10*3/MM3 (ref 140–450)
PMV BLD AUTO: 8.1 FL (ref 6–12)
POTASSIUM SERPL-SCNC: 4.2 MMOL/L (ref 3.5–5.2)
PROT SERPL-MCNC: 7.6 G/DL (ref 6–8.5)
PROT UR QL STRIP: ABNORMAL
RBC # BLD AUTO: 4.39 10*6/MM3 (ref 3.77–5.28)
RBC # UR: ABNORMAL /HPF
REF LAB TEST METHOD: ABNORMAL
SODIUM SERPL-SCNC: 142 MMOL/L (ref 136–145)
SP GR UR STRIP: >=1.03 (ref 1–1.03)
SQUAMOUS #/AREA URNS HPF: ABNORMAL /HPF
T4 FREE SERPL-MCNC: 1.29 NG/DL (ref 0.93–1.7)
TRIGL SERPL-MCNC: 186 MG/DL (ref 0–150)
TSH SERPL DL<=0.05 MIU/L-ACNC: 3.76 UIU/ML (ref 0.27–4.2)
UROBILINOGEN UR QL STRIP: ABNORMAL
VIT B12 BLD-MCNC: 1169 PG/ML (ref 211–946)
VLDLC SERPL-MCNC: 31 MG/DL (ref 5–40)
WBC # BLD AUTO: 7.6 10*3/MM3 (ref 3.4–10.8)
WBC UR QL AUTO: ABNORMAL /HPF

## 2021-02-10 PROCEDURE — 80053 COMPREHEN METABOLIC PANEL: CPT | Performed by: FAMILY MEDICINE

## 2021-02-10 PROCEDURE — 82607 VITAMIN B-12: CPT | Performed by: FAMILY MEDICINE

## 2021-02-10 PROCEDURE — 81001 URINALYSIS AUTO W/SCOPE: CPT | Performed by: FAMILY MEDICINE

## 2021-02-10 PROCEDURE — 99214 OFFICE O/P EST MOD 30 MIN: CPT | Performed by: FAMILY MEDICINE

## 2021-02-10 PROCEDURE — 80061 LIPID PANEL: CPT | Performed by: FAMILY MEDICINE

## 2021-02-10 PROCEDURE — 84443 ASSAY THYROID STIM HORMONE: CPT | Performed by: FAMILY MEDICINE

## 2021-02-10 PROCEDURE — 85025 COMPLETE CBC W/AUTO DIFF WBC: CPT | Performed by: FAMILY MEDICINE

## 2021-02-10 PROCEDURE — G0439 PPPS, SUBSEQ VISIT: HCPCS | Performed by: FAMILY MEDICINE

## 2021-02-10 PROCEDURE — 84439 ASSAY OF FREE THYROXINE: CPT | Performed by: FAMILY MEDICINE

## 2021-02-10 PROCEDURE — 82306 VITAMIN D 25 HYDROXY: CPT | Performed by: FAMILY MEDICINE

## 2021-02-10 PROCEDURE — 83036 HEMOGLOBIN GLYCOSYLATED A1C: CPT | Performed by: FAMILY MEDICINE

## 2021-02-10 NOTE — ASSESSMENT & PLAN NOTE
Diabetes is improving with treatment.   Continue current treatment regimen.  Reminded to bring in blood sugar diary at next visit.  Dietary recommendations for ADA diet.  Regular aerobic exercise.  Diabetes will be reassessed in 1 year.    Get A1c

## 2021-02-10 NOTE — ASSESSMENT & PLAN NOTE
Hypertension is improving with treatment.  Continue current treatment regimen.  Dietary sodium restriction.  Weight loss.  Regular aerobic exercise.  Stop smoking.  Continue current medications.  Medication changes per orders.  Blood pressure will be reassessed at the next regular appointment.    Amlodipine, quinapril 40

## 2021-02-10 NOTE — PATIENT INSTRUCTIONS
Medicare Wellness  Personal Prevention Plan of Service     Date of Office Visit:  02/10/2021  Encounter Provider:  Abiodun Busch MD  Place of Service:  [facility]  Patient Name: Maylin Garcia  :  1945    As part of the Medicare Wellness portion of your visit today, we are providing you with this personalized preventive plan of services (PPPS). This plan is based upon recommendations of the [place] Preventive Services Task Force (USPSTF) and the Advisory Committee on Immunization Practices (ACIP).    This lists the preventive care services that should be considered, and provides dates of when you are due. Items listed as completed are up-to-date and do not require any further intervention.    Health Maintenance   Topic Date Due   • URINE MICROALBUMIN  1945   • HEPATITIS C SCREENING  12/10/2018   • DIABETIC FOOT EXAM  12/10/2018   • DXA SCAN  2019   • ZOSTER VACCINE (2 of 2) 2020   • HEMOGLOBIN A1C  2020   • ANNUAL WELLNESS VISIT  2020   • LIPID PANEL  2020   • DIABETIC EYE EXAM  2022   • MAMMOGRAM  2022   • TDAP/TD VACCINES (2 - Td) 2022   • COLONOSCOPY  2029   • INFLUENZA VACCINE  Completed   • Pneumococcal Vaccine 65+  Completed   • MENINGOCOCCAL VACCINE  Aged Out       No orders of the defined types were placed in this encounter.      No follow-ups on file.

## 2021-02-10 NOTE — ASSESSMENT & PLAN NOTE
Maylin Garcia is a 75 y.o. female who presents for a Subsequent Medicare Wellness Visit.  Upon arrival to the room the patient underwent the Medicare health risk assessment.  Neither the questions themselves or the answers that she gave prompted any concern on her part or on the part of the nursing staff they gave the assessment.  As far as her preventative care examination and her preventative care immunizations they are as alluded below.  Screening tests recommended:    Colonoscopy- up-to-date-  Mammogram--up-to-date  CCEO-xm-tz-date patient does not want to get this  PAP/ Ikstqb-bp-ij-date  Diabetic eye exam up-to-date  Diabetic foot exam today--NORMAL    Immunization:  Influenza-up-to-date  Prevnar-Up to date  Pneumovax-up to date  Tetanus--up to date.  Do next year  Shingles vaccine  -up to date  Hepatitis   --lab ok.

## 2021-02-10 NOTE — PROGRESS NOTES
The ABCs of the Annual Wellness Visit  Subsequent Medicare Wellness Visit    Chief Complaint   Patient presents with   • Medicare Wellness-subsequent     YEARLY MEDICARE WELLNESS S/P FALL FACIAL ABRASION RT ELBOW PAIN    • Hyperlipidemia   • Hypertension   • Diabetes   • Facial Pain       Subjective   History of Present Illness:  Maylin Garcia is a 75 y.o. female who presents for a Subsequent Medicare Wellness Visit.  Upon arrival to the room the patient underwent the Medicare health risk assessment.  Neither the questions themselves or the answers that she gave prompted any concern on her part or on the part of the nursing staff they gave the assessment.  As far as her preventative care examination and her preventative care immunizations they are as alluded below.  Screening tests recommended:    Colonoscopy- up-to-date-  Mammogram--up-to-date  ZLLJ-ib-sk-date patient does not want to get this  PAP/ Gidyuh-pe-vt-date  Diabetic eye exam up-to-date  Diabetic foot exam today--NORMAL    Immunization:  Influenza-up-to-date  Prevnar-Up to date  Pneumovax-up to date  Tetanus--up to date.  Do next year  Shingles vaccine  -up to date  Hepatitis   --lab ok.                         Patient is also here for her physical and labs.   She is followed in the office for hyperlipidemia, hypertension, type 2 diabetes, vitamin D deficiency, GERD,And arthritis.  We will do  a physical exam and some labs today.  We will make adjustments in her medicine if needed.            HEALTH RISK ASSESSMENT    Recent Hospitalizations:  No hospitalization(s) within the last year.    Current Medical Providers:  Patient Care Team:  Abiodun Busch MD as PCP - General (Family Medicine)    Smoking Status:  Social History     Tobacco Use   Smoking Status Former Smoker   • Packs/day: 1.00   • Years: 15.00   • Pack years: 15.00   • Types: Cigarettes   • Quit date: 2/10/1981   • Years since quittin.0   Smokeless Tobacco Never Used       Alcohol  Consumption:  Social History     Substance and Sexual Activity   Alcohol Use No   • Frequency: Never       Depression Screen:   PHQ-2/PHQ-9 Depression Screening 2/10/2021   Little interest or pleasure in doing things 0   Feeling down, depressed, or hopeless 0   Trouble falling or staying asleep, or sleeping too much 0   Feeling tired or having little energy 0   Poor appetite or overeating 0   Feeling bad about yourself - or that you are a failure or have let yourself or your family down 0   Trouble concentrating on things, such as reading the newspaper or watching television 0   Moving or speaking so slowly that other people could have noticed. Or the opposite - being so fidgety or restless that you have been moving around a lot more than usual 0   Thoughts that you would be better off dead, or of hurting yourself in some way 0   Total Score 0   If you checked off any problems, how difficult have these problems made it for you to do your work, take care of things at home, or get along with other people? Not difficult at all       Fall Risk Screen:  STEADI Fall Risk Assessment was completed, and patient is at HIGH risk for falls. Assessment completed on:2/10/2021    Health Habits and Functional and Cognitive Screening:  Functional & Cognitive Status 2/10/2021   Do you have difficulty preparing food and eating? No   Do you have difficulty bathing yourself, getting dressed or grooming yourself? No   Do you have difficulty using the toilet? No   Do you have difficulty moving around from place to place? No   Do you have trouble with steps or getting out of a bed or a chair? No   Current Diet Well Balanced Diet   Dental Exam Up to date   Eye Exam Up to date   Exercise (times per week) 4 times per week   Current Exercises Include Walking   Current Exercise Activities Include -   Do you need help using the phone?  No   Are you deaf or do you have serious difficulty hearing?  No   Do you need help with transportation? No    Do you need help shopping? No   Do you need help preparing meals?  No   Do you need help with housework?  No   Do you need help with laundry? No   Do you need help taking your medications? No   Do you need help managing money? No   Do you ever drive or ride in a car without wearing a seat belt? No   Have you felt unusual stress, anger or loneliness in the last month? No   Who do you live with? Spouse   If you need help, do you have trouble finding someone available to you? No   Have you been bothered in the last four weeks by sexual problems? No   Do you have difficulty concentrating, remembering or making decisions? No         Does the patient have evidence of cognitive impairment? No    Asprin use counseling:Taking ASA appropriately as indicated    Age-appropriate Screening Schedule:  Refer to the list below for future screening recommendations based on patient's age, sex and/or medical conditions. Orders for these recommended tests are listed in the plan section. The patient has been provided with a written plan.    Health Maintenance   Topic Date Due   • URINE MICROALBUMIN  1945   • DIABETIC FOOT EXAM  12/10/2018   • DXA SCAN  07/04/2019   • ZOSTER VACCINE (2 of 2) 04/14/2020   • HEMOGLOBIN A1C  05/27/2020   • LIPID PANEL  11/27/2020   • DIABETIC EYE EXAM  01/20/2022   • MAMMOGRAM  06/01/2022   • TDAP/TD VACCINES (2 - Td) 09/20/2022   • COLONOSCOPY  02/21/2029   • INFLUENZA VACCINE  Completed          The following portions of the patient's history were reviewed and updated as appropriate: allergies, current medications, past family history, past medical history, past social history, past surgical history and problem list.    Outpatient Medications Prior to Visit   Medication Sig Dispense Refill   • amLODIPine (NORVASC) 10 MG tablet Take 1 tablet by mouth Daily. 90 tablet 4   • aspirin 81 MG EC tablet Take 81 mg by mouth Daily.     • atorvastatin (LIPITOR) 10 MG tablet Take 1 tablet by mouth Daily. 90  tablet 3   • celecoxib (CeleBREX) 200 MG capsule Take 1 capsule by mouth 2 (Two) Times a Day. 180 capsule 4   • Cholecalciferol (VITAMIN D) 50 MCG (2000 UT) capsule Take  by mouth.     • CRANBERRY EXTRACT PO Take  by mouth.     • Cyanocobalamin (VITAMIN B-12 PO) Take  by mouth.     • esomeprazole (nexIUM) 40 MG capsule NEXIUM 40 MG CPDR     • ferrous gluconate 324 (37.5 Fe) MG tablet tablet Take  by mouth.     • FLUoxetine (PROzac) 20 MG capsule Take 1 capsule by mouth Daily. 90 capsule 3   • FOLIC ACID PO Take  by mouth.     • HYDROcodone-acetaminophen (NORCO)  MG per tablet Take 1 tbalet every 4-6 hours MAX 4 tablets per day 360 tablet 0   • metFORMIN (GLUCOPHAGE) 500 MG tablet Take 2 tablets by mouth 2 (Two) Times a Day. 360 tablet 3   • Multiple Vitamin (ONE-A-DAY ESSENTIAL) tablet Take  by mouth.     • Omega-3 Fatty Acids (FISH OIL) 1200 MG capsule capsule Take  by mouth.     • pantoprazole (PROTONIX) 40 MG EC tablet Take 1 tablet by mouth Daily. 90 tablet 1   • phentermine (ADIPEX-P) 37.5 MG tablet Take 1 tablet by mouth Daily. 30 tablet 3   • quinapril (ACCUPRIL) 40 MG tablet Take 1 tablet by mouth Daily for 90 days. 90 tablet 2   • cephalexin (KEFLEX) 500 MG capsule cephalexin 500 mg capsule     • fluconazole (Diflucan) 200 MG tablet Take one qod for 4 doses. 4 tablet 0   • hepatitis A (Havrix) 1440 EL U/ML vaccine Havrix (PF) 1,440 PETER unit/mL intramuscular syringe     • influenza vac split quad (Afluria Quadrivalent) 0.5 ML suspension prefilled syringe injection Afluria Qd 2019-20 (36 mos up)(PF)60 mcg (15 mcg x4)/0.5 mL IM syringe     • Zoster Vac Recomb Adjuvanted (Shingrix) 50 MCG/0.5ML reconstituted suspension Shingrix (PF) 50 mcg/0.5 mL intramuscular suspension, kit       No facility-administered medications prior to visit.        Patient Active Problem List   Diagnosis   • Asthmatic bronchitis   • Low back pain   • Gastro-esophageal reflux disease with esophagitis   • Hyperlipidemia   •  "Hypertension   • Osteoarthritis   • Overweight   • Type 2 diabetes mellitus (CMS/[facility])   • Medicare annual wellness visit, subsequent   • Vitamin D deficiency, unspecified        Advanced Care Planning:  ACP discussion was held with the patient during this visit. Patient has an advance directive in EMR which is still valid.     Review of Systems    Compared to one year ago, the patient feels her physical health is worse.  Compared to one year ago, the patient feels her mental health is the same.    Reviewed chart for potential of high risk medication in the elderly: yes  Reviewed chart for potential of harmful drug interactions in the elderly:yes    Objective         Vitals:    02/10/21 1052   BP: 166/75   BP Location: Left arm   Patient Position: Sitting   Pulse: 100   Resp: 20   Temp: 97 °F (36.1 °C)   TempSrc: Temporal   SpO2: 99%   Weight: 91.6 kg (202 lb)   Height: 167.6 cm (66\")   PainSc:   5       Body mass index is 32.6 kg/m².  Discussed the patient's BMI with her. The BMI is above average; BMI management plan is completed.    Physical Exam  Constitutional:       Appearance: Normal appearance. She is well-developed and normal weight.   HENT:      Head: Normocephalic and atraumatic.      Right Ear: Tympanic membrane, ear canal and external ear normal.      Left Ear: Tympanic membrane, ear canal and external ear normal.      Nose: Nose normal.      Mouth/Throat:      Mouth: Mucous membranes are moist.      Pharynx: Oropharynx is clear. No oropharyngeal exudate.   Eyes:      Extraocular Movements: Extraocular movements intact.      Conjunctiva/sclera: Conjunctivae normal.      Pupils: Pupils are equal, round, and reactive to light.   Neck:      Musculoskeletal: Normal range of motion and neck supple.   Cardiovascular:      Rate and Rhythm: Normal rate and regular rhythm.      Pulses: Normal pulses.      Heart sounds: Normal heart sounds.   Pulmonary:      Effort: Pulmonary effort is normal.      Breath sounds: " Normal breath sounds.   Abdominal:      General: Bowel sounds are normal.      Palpations: Abdomen is soft.   Musculoskeletal: Normal range of motion.   Skin:     General: Skin is warm and dry.      Comments: Facial abrasions and contusions   Neurological:      General: No focal deficit present.      Mental Status: She is alert and oriented to person, place, and time.   Psychiatric:         Mood and Affect: Mood normal.         Behavior: Behavior normal.         Thought Content: Thought content normal.         Judgment: Judgment normal.               Assessment/Plan   Medicare Risks and Personalized Health Plan  CMS Preventative Services Quick Reference  Advance Directive Discussion  Breast Cancer/Mammogram Screening  Cardiovascular risk  Colon Cancer Screening  Hearing Problem  Immunizations Discussed/Encouraged (specific immunizations; Td )  Obesity/Overweight   Osteoprorosis Risk    The above risks/problems have been discussed with the patient.  Pertinent information has been shared with the patient in the After Visit Summary.  Follow up plans and orders are seen below in the Assessment/Plan Section.    Diagnoses and all orders for this visit:    1. Medicare annual wellness visit, subsequent (Primary)  Assessment & Plan:  Maylin Garcia is a 75 y.o. female who presents for a Subsequent Medicare Wellness Visit.  Upon arrival to the room the patient underwent the Medicare health risk assessment.  Neither the questions themselves or the answers that she gave prompted any concern on her part or on the part of the nursing staff they gave the assessment.  As far as her preventative care examination and her preventative care immunizations they are as alluded below.  Screening tests recommended:    Colonoscopy- up-to-date-  Mammogram--up-to-date  NTMZ-nj-hn-date patient does not want to get this  PAP/ Szmeso-fa-zl-date  Diabetic eye exam up-to-date  Diabetic foot exam  today--NORMAL    Immunization:  Influenza-up-to-date  Prevnar-Up to date  Pneumovax-up to date  Tetanus--up to date.  Do next year  Shingles vaccine  -up to date  Hepatitis   --lab ok.                         2. Mixed hyperlipidemia  Assessment & Plan:  Lipid abnormalities are improving with treatment.  Nutritional counseling was provided., The patient was referred to the dietician. and Pharmacotherapy as ordered.  Lipids will be reassessed in 1 year.    Continue Lipitor 10mg      3. Essential hypertension  Assessment & Plan:  Hypertension is improving with treatment.  Continue current treatment regimen.  Dietary sodium restriction.  Weight loss.  Regular aerobic exercise.  Stop smoking.  Continue current medications.  Medication changes per orders.  Blood pressure will be reassessed at the next regular appointment.    Amlodipine, quinapril 40      4. Overweight  Assessment & Plan:  Patient's (Body mass index is 32.6 kg/m².) indicates that they are morbidly obese (BMI > 40 or > 35 with obesity - related health condition) with obesity-related health conditions that include obstructive sleep apnea . Obesity is newly identified. BMI is is above average; BMI management plan is completed. We discussed portion control and increasing exercise.     Low carb and exercise.      5. Type 2 diabetes mellitus without complication, without long-term current use of insulin (CMS/[facility])  Assessment & Plan:  Diabetes is improving with treatment.   Continue current treatment regimen.  Reminded to bring in blood sugar diary at next visit.  Dietary recommendations for ADA diet.  Regular aerobic exercise.  Diabetes will be reassessed in 1 year.    Get A1c       6. Gastroesophageal reflux disease with esophagitis without hemorrhage  Assessment & Plan:  Using pantoprazole      Follow Up:  Return in about 1 year (around 2/10/2022) for Medicare Wellness.     An After Visit Summary and PPPS were given to the patient.

## 2021-02-10 NOTE — ASSESSMENT & PLAN NOTE
Lipid abnormalities are improving with treatment.  Nutritional counseling was provided., The patient was referred to the dietician. and Pharmacotherapy as ordered.  Lipids will be reassessed in 1 year.    Continue Lipitor 10mg

## 2021-02-10 NOTE — ASSESSMENT & PLAN NOTE
Patient's (Body mass index is 32.6 kg/m².) indicates that they are morbidly obese (BMI > 40 or > 35 with obesity - related health condition) with obesity-related health conditions that include obstructive sleep apnea . Obesity is newly identified. BMI is is above average; BMI management plan is completed. We discussed portion control and increasing exercise.     Low carb and exercise.

## 2021-02-12 NOTE — PROGRESS NOTES
Tell Maylin that we want to repeat her CMP in about 3 months to make sure her calcium stays stable.  It is a little high and if it continues to climb we will have to evaluate that.  Otherwise her blood test looks pretty good.  She needs to drink a little more water on a daily basis.  She is on the dry side.  Everything else looks good

## 2021-02-21 DIAGNOSIS — E66.3 OVERWEIGHT: ICD-10-CM

## 2021-02-22 RX ORDER — PHENTERMINE HYDROCHLORIDE 37.5 MG/1
37.5 TABLET ORAL DAILY
Qty: 90 TABLET | Refills: 1 | Status: SHIPPED | OUTPATIENT
Start: 2021-02-22 | End: 2021-11-30

## 2021-04-01 ENCOUNTER — TELEPHONE (OUTPATIENT)
Dept: FAMILY MEDICINE CLINIC | Facility: CLINIC | Age: 76
End: 2021-04-01

## 2021-04-01 NOTE — TELEPHONE ENCOUNTER
----- Message from Jerica Campbell CMA sent at 4/1/2021  1:49 PM EDT -----  Regarding: FW: Non-Urgent Medical Question  Contact: [phone]    ----- Message -----  From: Maylin Garcia  Sent: 3/31/2021  10:34 PM EDT  To: Abiodun Busch MD  Subject: Non-Urgent Medical Question                      Hello,  I had a wellness visit with Dr. Busch in February and, I recall the doctor saying that I needed to return in 3 months to retest because I was dehydrated. Will you please let me know when I need to come in again?  Thanks,  Maylin Garcia

## 2021-05-06 DIAGNOSIS — M19.91 PRIMARY OSTEOARTHRITIS, UNSPECIFIED SITE: ICD-10-CM

## 2021-05-06 RX ORDER — HYDROCODONE BITARTRATE AND ACETAMINOPHEN 10; 325 MG/1; MG/1
TABLET ORAL
Qty: 360 TABLET | Refills: 0 | Status: SHIPPED | OUTPATIENT
Start: 2021-05-06 | End: 2021-08-04 | Stop reason: SDUPTHER

## 2021-05-06 NOTE — TELEPHONE ENCOUNTER
Caller: Maylin Garcia    Relationship: Self    Best call back number: [phone] ( WOULD LIKE A CALL BACK REGARDING REFILL REQUEST)    Medication needed:   Requested Prescriptions     Pending Prescriptions Disp Refills   • HYDROcodone-acetaminophen (NORCO)  MG per tablet 360 tablet 0     Sig: Take 1 tbalet every 4-6 hours MAX 4 tablets per day       When do you need the refill by: TODAY    What additional details did the patient provide when requesting the   medication: 90 DAY SUPPLY REQUESTED. SHE IS NOT RUNNING OUT OF MEDICATION     Does the patient have less than a 3 day supply:  [] Yes  [x] No    What is the patient's preferred pharmacy: [facility] - [facility], [place] [address] [phone] [facility] [phone]

## 2021-05-20 RX ORDER — QUINAPRIL 40 MG/1
TABLET ORAL
Qty: 180 TABLET | Refills: 3 | Status: SHIPPED | OUTPATIENT
Start: 2021-05-20 | End: 2022-06-23

## 2021-05-25 ENCOUNTER — OFFICE VISIT (OUTPATIENT)
Dept: CARDIOLOGY | Facility: CLINIC | Age: 76
End: 2021-05-25

## 2021-05-25 VITALS
OXYGEN SATURATION: 96 % | HEART RATE: 63 BPM | DIASTOLIC BLOOD PRESSURE: 62 MMHG | HEIGHT: 67 IN | SYSTOLIC BLOOD PRESSURE: 110 MMHG | WEIGHT: 201 LBS | BODY MASS INDEX: 31.55 KG/M2

## 2021-05-25 DIAGNOSIS — I10 ESSENTIAL HYPERTENSION: Primary | ICD-10-CM

## 2021-05-25 DIAGNOSIS — R07.9 CHEST PAIN, UNSPECIFIED TYPE: ICD-10-CM

## 2021-05-25 PROBLEM — K21.9 GASTROESOPHAGEAL REFLUX DISEASE: Status: ACTIVE | Noted: 2021-05-25

## 2021-05-25 PROBLEM — N20.0 CALCULUS OF KIDNEY: Status: ACTIVE | Noted: 2021-05-25

## 2021-05-25 PROBLEM — H26.9 CATARACT: Status: ACTIVE | Noted: 2021-05-25

## 2021-05-25 PROBLEM — D69.3 IDIOPATHIC THROMBOCYTOPENIC PURPURA (HCC): Status: ACTIVE | Noted: 2021-05-25

## 2021-05-25 PROBLEM — F32.A DEPRESSIVE DISORDER: Status: ACTIVE | Noted: 2021-05-25

## 2021-05-25 PROBLEM — K63.5 POLYP OF COLON: Status: ACTIVE | Noted: 2021-05-25

## 2021-05-25 PROCEDURE — 99204 OFFICE O/P NEW MOD 45 MIN: CPT | Performed by: INTERNAL MEDICINE

## 2021-05-25 PROCEDURE — 93000 ELECTROCARDIOGRAM COMPLETE: CPT | Performed by: INTERNAL MEDICINE

## 2021-05-25 RX ORDER — MULTIVIT-MIN/BIOTIN/HERBAL 194 300 MCG
CAPSULE ORAL 2 TIMES DAILY
COMMUNITY
End: 2022-09-05

## 2021-05-25 RX ORDER — FLUCONAZOLE 200 MG/1
TABLET ORAL
COMMUNITY
End: 2021-05-25

## 2021-05-25 RX ORDER — CARISOPRODOL 350 MG/1
350 TABLET ORAL 4 TIMES DAILY PRN
COMMUNITY
End: 2021-08-26

## 2021-05-25 RX ORDER — MAGNESIUM 200 MG
TABLET ORAL DAILY
COMMUNITY
End: 2021-08-26

## 2021-05-25 RX ORDER — FEXOFENADINE HCL 180 MG/1
180 TABLET ORAL DAILY
COMMUNITY
End: 2022-05-13

## 2021-05-25 RX ORDER — ONDANSETRON 4 MG/1
4 TABLET, FILM COATED ORAL EVERY 6 HOURS PRN
COMMUNITY
Start: 2021-04-01 | End: 2021-05-25

## 2021-05-25 RX ORDER — ASCORBIC ACID 500 MG
1000 TABLET ORAL 2 TIMES DAILY
COMMUNITY
Start: 2021-04-01 | End: 2021-11-13

## 2021-05-25 RX ORDER — GLIMEPIRIDE 1 MG/1
1 TABLET ORAL
COMMUNITY
End: 2022-02-21 | Stop reason: SDUPTHER

## 2021-05-25 RX ORDER — KRILL/OM-3/DHA/EPA/PHOSPHO/AST 500MG-86MG
CAPSULE ORAL
COMMUNITY
End: 2021-08-26

## 2021-05-25 RX ORDER — CEPHALEXIN 500 MG/1
TABLET ORAL
COMMUNITY
End: 2021-08-26

## 2021-05-25 RX ORDER — IBUPROFEN 200 MG
200 TABLET ORAL EVERY 6 HOURS PRN
COMMUNITY
End: 2021-08-26

## 2021-05-25 RX ORDER — ATORVASTATIN CALCIUM 10 MG/1
10 TABLET, FILM COATED ORAL DAILY
COMMUNITY
End: 2022-02-21 | Stop reason: SDUPTHER

## 2021-06-09 ENCOUNTER — TELEPHONE (OUTPATIENT)
Dept: FAMILY MEDICINE CLINIC | Facility: CLINIC | Age: 76
End: 2021-06-09

## 2021-06-09 NOTE — TELEPHONE ENCOUNTER
PATIENT WAS ANGRY THAT I EXPECTED HER TO HAVE HER OWN APPT TIME AND INSISTED THAT SHE WILL GO INTO HER 'S APPT ON Friday AND DR PUENTE WILL TREAT HER AT THE SAME TIME.

## 2021-06-09 NOTE — TELEPHONE ENCOUNTER
Caller: Maylin Garcia    Relationship to patient: Self    Best call back number: [phone]    Chief complaint: NUMBNESS IN HAND    Type of visit: OFFICE    Requested date: 6/11/21 9:15 AM     If rescheduling, when is the original appointment:     Additional notes:PATIENT CALLED IN AND STATED HER  TESSY MCKINNEY HAS AN APPOINTMENT Friday 6/11/21 AT 9:15 AM SHE WOULD LIKE TO BE SEEN WITH HIM. STATED THAT SHE IS SUPPOSED TO HAVE HIP SURGERY IN ONE MONTH AND USES A CANE AT THIS TIME. SHE STATED SHE WOULD LIKE DR PUENTE TO SEE HER AND PRESCRIBE PT FOR HER HAND, SHE IS UNSURE OF HOW SHE WILL BE ABLE TO NAVIGATE AFTER HIP SURGERY WITH A NUMB HAND..    PLEASE ADVISE

## 2021-06-23 ENCOUNTER — TRANSCRIBE ORDERS (OUTPATIENT)
Dept: ADMINISTRATIVE | Facility: HOSPITAL | Age: 76
End: 2021-06-23

## 2021-06-23 DIAGNOSIS — Z78.0 MENOPAUSE: Primary | ICD-10-CM

## 2021-06-24 ENCOUNTER — HOSPITAL ENCOUNTER (OUTPATIENT)
Dept: NUCLEAR MEDICINE | Facility: HOSPITAL | Age: 76
Discharge: HOME | End: 2021-06-24

## 2021-06-24 DIAGNOSIS — R07.9 CHEST PAIN, UNSPECIFIED TYPE: ICD-10-CM

## 2021-06-24 DIAGNOSIS — I10 ESSENTIAL HYPERTENSION: ICD-10-CM

## 2021-06-24 PROCEDURE — 93016 CV STRESS TEST SUPVJ ONLY: CPT | Performed by: NURSE PRACTITIONER

## 2021-06-24 PROCEDURE — 78452 HT MUSCLE IMAGE SPECT MULT: CPT | Performed by: INTERNAL MEDICINE

## 2021-06-24 PROCEDURE — 78452 HT MUSCLE IMAGE SPECT MULT: CPT

## 2021-06-24 PROCEDURE — 0 TECHNETIUM SESTAMIBI: Performed by: INTERNAL MEDICINE

## 2021-06-24 PROCEDURE — 93018 CV STRESS TEST I&R ONLY: CPT | Performed by: INTERNAL MEDICINE

## 2021-06-24 PROCEDURE — A9500 TC99M SESTAMIBI: HCPCS | Performed by: INTERNAL MEDICINE

## 2021-06-24 PROCEDURE — 93017 CV STRESS TEST TRACING ONLY: CPT

## 2021-06-24 RX ADMIN — TECHNETIUM TC 99M SESTAMIBI 1 DOSE: 1 INJECTION INTRAVENOUS at 12:58

## 2021-06-24 RX ADMIN — TECHNETIUM TC 99M SESTAMIBI 1 DOSE: 1 INJECTION INTRAVENOUS at 14:00

## 2021-06-25 ENCOUNTER — LAB (OUTPATIENT)
Dept: FAMILY MEDICINE CLINIC | Facility: CLINIC | Age: 76
End: 2021-06-25

## 2021-06-25 ENCOUNTER — TELEPHONE (OUTPATIENT)
Dept: FAMILY MEDICINE CLINIC | Facility: CLINIC | Age: 76
End: 2021-06-25

## 2021-06-25 DIAGNOSIS — E11.9 TYPE 2 DIABETES MELLITUS WITHOUT COMPLICATION, WITHOUT LONG-TERM CURRENT USE OF INSULIN (HCC): Primary | ICD-10-CM

## 2021-06-25 DIAGNOSIS — E11.9 TYPE 2 DIABETES MELLITUS WITHOUT COMPLICATION, WITHOUT LONG-TERM CURRENT USE OF INSULIN (HCC): ICD-10-CM

## 2021-06-25 LAB
BH CV NUCLEAR PRIOR STUDY: 3
BH CV REST NUCLEAR ISOTOPE DOSE: 7.9 MCI
BH CV STRESS BP STAGE 1: NORMAL
BH CV STRESS BP STAGE 2: NORMAL
BH CV STRESS BP STAGE 3: NORMAL
BH CV STRESS BP STAGE 4: NORMAL
BH CV STRESS COMMENTS STAGE 1: NORMAL
BH CV STRESS COMMENTS STAGE 2: NORMAL
BH CV STRESS DOSE REGADENOSON STAGE 1: 0.4
BH CV STRESS DURATION MIN STAGE 1: 0
BH CV STRESS DURATION MIN STAGE 2: 4
BH CV STRESS DURATION SEC STAGE 1: 10
BH CV STRESS DURATION SEC STAGE 2: 0
BH CV STRESS HR STAGE 1: 65
BH CV STRESS HR STAGE 2: 96
BH CV STRESS HR STAGE 3: 100
BH CV STRESS HR STAGE 4: 100
BH CV STRESS NUCLEAR ISOTOPE DOSE: 21.3 MCI
BH CV STRESS PROTOCOL 1: NORMAL
BH CV STRESS RECOVERY BP: NORMAL MMHG
BH CV STRESS RECOVERY HR: 96 BPM
BH CV STRESS STAGE 1: 1
BH CV STRESS STAGE 2: 2
BH CV STRESS STAGE 3: 3
BH CV STRESS STAGE 4: 4
LV EF NUC BP: 66 %
MAXIMAL PREDICTED HEART RATE: 147 BPM
PERCENT MAX PREDICTED HR: 68.03 %
STRESS BASELINE BP: NORMAL MMHG
STRESS BASELINE HR: 69 BPM
STRESS PERCENT HR: 80 %
STRESS POST PEAK BP: NORMAL MMHG
STRESS POST PEAK HR: 100 BPM
STRESS TARGET HR: 125 BPM

## 2021-06-25 PROCEDURE — 80053 COMPREHEN METABOLIC PANEL: CPT | Performed by: FAMILY MEDICINE

## 2021-06-25 PROCEDURE — 36415 COLL VENOUS BLD VENIPUNCTURE: CPT

## 2021-06-25 PROCEDURE — 83036 HEMOGLOBIN GLYCOSYLATED A1C: CPT | Performed by: FAMILY MEDICINE

## 2021-06-25 PROCEDURE — 82043 UR ALBUMIN QUANTITATIVE: CPT | Performed by: FAMILY MEDICINE

## 2021-06-25 NOTE — TELEPHONE ENCOUNTER
Patient is coming in today under [facility] at 2:30pm for an A1C.  Said PMJ told her to.  Please put lab orders in.

## 2021-06-26 LAB
ALBUMIN SERPL-MCNC: 4.6 G/DL (ref 3.5–5.2)
ALBUMIN UR-MCNC: 2.1 MG/DL
ALBUMIN/GLOB SERPL: 1.8 G/DL
ALP SERPL-CCNC: 74 U/L (ref 39–117)
ALT SERPL W P-5'-P-CCNC: 27 U/L (ref 1–33)
ANION GAP SERPL CALCULATED.3IONS-SCNC: 10.4 MMOL/L (ref 5–15)
AST SERPL-CCNC: 20 U/L (ref 1–32)
BILIRUB SERPL-MCNC: 0.4 MG/DL (ref 0–1.2)
BUN SERPL-MCNC: 19 MG/DL (ref 8–23)
BUN/CREAT SERPL: 18.6 (ref 7–25)
CALCIUM SPEC-SCNC: 9.5 MG/DL (ref 8.6–10.5)
CHLORIDE SERPL-SCNC: 102 MMOL/L (ref 98–107)
CO2 SERPL-SCNC: 29.6 MMOL/L (ref 22–29)
CREAT SERPL-MCNC: 1.02 MG/DL (ref 0.57–1)
GFR SERPL CREATININE-BSD FRML MDRD: 53 ML/MIN/1.73
GLOBULIN UR ELPH-MCNC: 2.5 GM/DL
GLUCOSE SERPL-MCNC: 94 MG/DL (ref 65–99)
POTASSIUM SERPL-SCNC: 4.6 MMOL/L (ref 3.5–5.2)
PROT SERPL-MCNC: 7.1 G/DL (ref 6–8.5)
SODIUM SERPL-SCNC: 142 MMOL/L (ref 136–145)

## 2021-06-28 ENCOUNTER — TELEPHONE (OUTPATIENT)
Dept: CARDIOLOGY | Facility: CLINIC | Age: 76
End: 2021-06-28

## 2021-06-28 ENCOUNTER — TELEPHONE (OUTPATIENT)
Dept: FAMILY MEDICINE CLINIC | Facility: CLINIC | Age: 76
End: 2021-06-28

## 2021-06-28 LAB — HBA1C MFR BLD: 5.9 % (ref 3.5–5.6)

## 2021-06-28 NOTE — TELEPHONE ENCOUNTER
Caller: JUANA VELASQUEZ    Relationship: Emergency Contact    Best call back number: [phone]    What test was performed: LABS    When was the test performed: 6/25      Additional notes: DID NOT SEE A1C ON RESULTS ON MYCHART, ONLY GLUCOSE.  JOSEPH ALSO HAS A MESSAGE REGARDING SAME ISSUE FOR HER  TESSY MCKINNEYCHENCHO

## 2021-06-28 NOTE — TELEPHONE ENCOUNTER
Tell Maylin that her A1c is 5.9% indicating good control.  No changes need to be made in her medication or her diet.

## 2021-08-02 ENCOUNTER — TELEPHONE (OUTPATIENT)
Dept: FAMILY MEDICINE CLINIC | Facility: CLINIC | Age: 76
End: 2021-08-02

## 2021-08-02 DIAGNOSIS — T81.49XA POSTOPERATIVE WOUND INFECTION: Primary | ICD-10-CM

## 2021-08-02 DIAGNOSIS — B99.9 UNSPECIFIED INFECTIOUS DISEASE: ICD-10-CM

## 2021-08-02 NOTE — TELEPHONE ENCOUNTER
Caller: Maylin Garcia    Relationship to patient: Self    Best call back number: [phone]    Patient is needing: PATIENT HAD A LEFT HIP REPLACEMENT DONE ABOUT THREE WEEKS AGO, SHE SAID SHE THINKS IT IS INFECTED    SHE SAID THAT HER SURGEON IS ABOUT 40 MINUTES AWAY AND THAT HE DOESN'T HAVE AVAILABLE OPENINGS     SHE SAID SHE WAS SEEN AT THE EMERGENCY ROOM LAST WEEK, BECAUSE HER LEG WAS SWOLLEN, SHE SAID THAT THEY TOLD HER SHE DID NOT HAVE A BLOOD CLOT, BUT TOLD HER SHE MAY HAVE AN INFECTION AND SHE WAS GIVEN ANTIBIOTICS BUT NO INFORMATION TO FOLLOW UP     THE PATIENT SAID THAT SHE REALLY NEEDS TO SEE DR. PUENTE     THE PATIENT DOES NOT WANT TO SEE A NURSE PRACTITIONER AND HER  IS NOT DOING WELL AND CAN'T DRIVE HER TO HER SURGEON      THE PATIENT WAS UPSET, CRYING AND SAID HER AND HER  ARE DESPERATE FOR DR. PUENTE'S HELP

## 2021-08-03 ENCOUNTER — LAB (OUTPATIENT)
Dept: FAMILY MEDICINE CLINIC | Facility: CLINIC | Age: 76
End: 2021-08-03

## 2021-08-03 LAB
BASOPHILS # BLD AUTO: 0.06 10*3/MM3 (ref 0–0.2)
BASOPHILS NFR BLD AUTO: 1 % (ref 0–1.5)
BILIRUB UR QL STRIP: NEGATIVE
CLARITY UR: ABNORMAL
COLOR UR: YELLOW
CRP SERPL-MCNC: <0.3 MG/DL (ref 0–0.5)
DEPRECATED RDW RBC AUTO: 46.9 FL (ref 37–54)
EOSINOPHIL # BLD AUTO: 0.22 10*3/MM3 (ref 0–0.4)
EOSINOPHIL NFR BLD AUTO: 3.7 % (ref 0.3–6.2)
ERYTHROCYTE [DISTWIDTH] IN BLOOD BY AUTOMATED COUNT: 13.4 % (ref 12.3–15.4)
ERYTHROCYTE [SEDIMENTATION RATE] IN BLOOD: 11 MM/HR (ref 0–30)
GLUCOSE UR STRIP-MCNC: NEGATIVE MG/DL
HCT VFR BLD AUTO: 35 % (ref 34–46.6)
HGB BLD-MCNC: 11.5 G/DL (ref 12–15.9)
HGB UR QL STRIP.AUTO: NEGATIVE
IMM GRANULOCYTES # BLD AUTO: 0.02 10*3/MM3 (ref 0–0.05)
IMM GRANULOCYTES NFR BLD AUTO: 0.3 % (ref 0–0.5)
KETONES UR QL STRIP: ABNORMAL
LEUKOCYTE ESTERASE UR QL STRIP.AUTO: ABNORMAL
LYMPHOCYTES # BLD AUTO: 1.42 10*3/MM3 (ref 0.7–3.1)
LYMPHOCYTES NFR BLD AUTO: 23.8 % (ref 19.6–45.3)
MCH RBC QN AUTO: 31.3 PG (ref 26.6–33)
MCHC RBC AUTO-ENTMCNC: 32.9 G/DL (ref 31.5–35.7)
MCV RBC AUTO: 95.1 FL (ref 79–97)
MONOCYTES # BLD AUTO: 0.58 10*3/MM3 (ref 0.1–0.9)
MONOCYTES NFR BLD AUTO: 9.7 % (ref 5–12)
NEUTROPHILS NFR BLD AUTO: 3.66 10*3/MM3 (ref 1.7–7)
NEUTROPHILS NFR BLD AUTO: 61.5 % (ref 42.7–76)
NITRITE UR QL STRIP: NEGATIVE
NRBC BLD AUTO-RTO: 0 /100 WBC (ref 0–0.2)
PH UR STRIP.AUTO: 5.5 [PH] (ref 5–8)
PLATELET # BLD AUTO: 283 10*3/MM3 (ref 140–450)
PMV BLD AUTO: 10.7 FL (ref 6–12)
PROT UR QL STRIP: ABNORMAL
RBC # BLD AUTO: 3.68 10*6/MM3 (ref 3.77–5.28)
SP GR UR STRIP: >=1.03 (ref 1–1.03)
UROBILINOGEN UR QL STRIP: ABNORMAL
WBC # BLD AUTO: 5.96 10*3/MM3 (ref 3.4–10.8)

## 2021-08-03 PROCEDURE — 85025 COMPLETE CBC W/AUTO DIFF WBC: CPT | Performed by: FAMILY MEDICINE

## 2021-08-03 PROCEDURE — 36415 COLL VENOUS BLD VENIPUNCTURE: CPT

## 2021-08-03 PROCEDURE — 86140 C-REACTIVE PROTEIN: CPT | Performed by: FAMILY MEDICINE

## 2021-08-03 PROCEDURE — 81001 URINALYSIS AUTO W/SCOPE: CPT | Performed by: FAMILY MEDICINE

## 2021-08-03 PROCEDURE — 85652 RBC SED RATE AUTOMATED: CPT | Performed by: FAMILY MEDICINE

## 2021-08-04 DIAGNOSIS — M19.91 PRIMARY OSTEOARTHRITIS, UNSPECIFIED SITE: ICD-10-CM

## 2021-08-04 LAB
BACTERIA UR QL AUTO: ABNORMAL /HPF
HYALINE CASTS UR QL AUTO: ABNORMAL /LPF
RBC # UR: ABNORMAL /HPF
REF LAB TEST METHOD: ABNORMAL
SQUAMOUS #/AREA URNS HPF: ABNORMAL /HPF
WBC UR QL AUTO: ABNORMAL /HPF

## 2021-08-06 RX ORDER — HYDROCODONE BITARTRATE AND ACETAMINOPHEN 10; 325 MG/1; MG/1
TABLET ORAL
Qty: 360 TABLET | Refills: 0 | Status: SHIPPED | OUTPATIENT
Start: 2021-08-06 | End: 2021-11-02 | Stop reason: SDUPTHER

## 2021-08-09 ENCOUNTER — TELEPHONE (OUTPATIENT)
Dept: FAMILY MEDICINE CLINIC | Facility: CLINIC | Age: 76
End: 2021-08-09

## 2021-08-09 NOTE — TELEPHONE ENCOUNTER
Spoke with patient and she wants 8/26 at 2:30pm.    JOSEPH - Please put on PMJ's schedule for 8/26/2021 at 2:30pm for a 2 week follow up.

## 2021-08-09 NOTE — TELEPHONE ENCOUNTER
Caller: Maylin Garcia    Relationship to patient: Self    Best call back number: [phone]    Chief complaint: 2 WEEK FOLLOW UP    Type of visit: OFFICE    Requested date: 08/18/21 OR EARLY 08/19/21    If rescheduling, when is the original appointment: 08/19/21 IN AFTERNOON     Additional notes: PATIENT HAS A CONFLICT ON 08/19/21 AND NEEDS TO MOVE HER APPOINTMENT TIME

## 2021-08-26 ENCOUNTER — OFFICE VISIT (OUTPATIENT)
Dept: FAMILY MEDICINE CLINIC | Facility: CLINIC | Age: 76
End: 2021-08-26

## 2021-08-26 VITALS
RESPIRATION RATE: 16 BRPM | WEIGHT: 208 LBS | OXYGEN SATURATION: 96 % | TEMPERATURE: 97.1 F | HEART RATE: 81 BPM | DIASTOLIC BLOOD PRESSURE: 60 MMHG | HEIGHT: 67 IN | SYSTOLIC BLOOD PRESSURE: 130 MMHG | BODY MASS INDEX: 32.65 KG/M2

## 2021-08-26 DIAGNOSIS — M19.91 PRIMARY OSTEOARTHRITIS, UNSPECIFIED SITE: ICD-10-CM

## 2021-08-26 DIAGNOSIS — G89.29 CHRONIC BILATERAL LOW BACK PAIN WITHOUT SCIATICA: Primary | ICD-10-CM

## 2021-08-26 DIAGNOSIS — M54.50 CHRONIC BILATERAL LOW BACK PAIN WITHOUT SCIATICA: Primary | ICD-10-CM

## 2021-08-26 DIAGNOSIS — Z96.642 HISTORY OF TOTAL HIP REPLACEMENT, LEFT: ICD-10-CM

## 2021-08-26 PROCEDURE — 99213 OFFICE O/P EST LOW 20 MIN: CPT | Performed by: FAMILY MEDICINE

## 2021-08-26 NOTE — ASSESSMENT & PLAN NOTE
Patient has arthritis in her left knee.  The medial joint line quite tender.  She has nearly full range of motion.  She will undergo surgical therapy at [facility] and ideally increase the strength of her quads and calf and will see improved range of motion and deep crease her joint line pain over the next few months.

## 2021-08-26 NOTE — ASSESSMENT & PLAN NOTE
Patient has chronic low back pain.  Combination of degenerative disc disease and degenerative facet arthropathy.  [facility] pain in the back and increase range of motion.

## 2021-08-26 NOTE — PROGRESS NOTES
"Chief Complaint  Hand Pain    Subjective          Maylin Garcia presents to [facility]  Maylin is a 75-year-old white female who had a total left hip placement several weeks ago.  She is done very well as of the hip is concerned and is here now after previous visit for a 2-week follow-up.  Her left hip is doing quite well and the wound is healed.  She is moving very well on the hip.  She is having some knee pain particularly on the left.  Examination reveals some tenderness along the left medial knee joint area.  Patient has started physical therapy at [facility] for left hip and her back.  She has some back pain and stiffness that she is undergoing therapy for in the hip is undergoing postoperative therapy.  We may consider asking the therapist to add in the left knee which would benefit from some therapy at this point in time.                                        Hand Pain         Objective   Vital Signs:   /60 (BP Location: Right arm)   Pulse 81   Temp 97.1 °F (36.2 °C) (Infrared)   Resp 16   Ht 170.2 cm (67\")   Wt 94.3 kg (208 lb)   SpO2 96%   BMI 32.58 kg/m²     Physical Exam  Constitutional:       Appearance: Normal appearance. She is well-developed and normal weight.   HENT:      Head: Normocephalic and atraumatic.      Right Ear: Tympanic membrane, ear canal and external ear normal.      Left Ear: Tympanic membrane, ear canal and external ear normal.      Nose: Nose normal.      Mouth/Throat:      Mouth: Mucous membranes are moist.      Pharynx: Oropharynx is clear. No oropharyngeal exudate.   Eyes:      Extraocular Movements: Extraocular movements intact.      Conjunctiva/sclera: Conjunctivae normal.      Pupils: Pupils are equal, round, and reactive to light.   Cardiovascular:      Rate and Rhythm: Normal rate and regular rhythm.      Pulses: Normal pulses.      Heart sounds: Normal heart sounds.   Pulmonary:      Effort: Pulmonary effort is " normal.      Breath sounds: Normal breath sounds.   Abdominal:      General: Bowel sounds are normal.      Palpations: Abdomen is soft.   Musculoskeletal:         General: Normal range of motion.      Cervical back: Normal range of motion and neck supple.      Comments: Right hip wound has healed well.  She has good full range of motion of left hip.    Left knee has medial joint line blurriness.  She has some changes laterally of some arthritic changes in the lateral joint and joint line.   Skin:     General: Skin is warm and dry.   Neurological:      General: No focal deficit present.      Mental Status: She is alert and oriented to person, place, and time. Mental status is at baseline.   Psychiatric:         Mood and Affect: Mood normal.         Behavior: Behavior normal.         Thought Content: Thought content normal.         Judgment: Judgment normal.        Result Review :                 Assessment and Plan    Diagnoses and all orders for this visit:    1. Chronic bilateral low back pain without sciatica (Primary)  Assessment & Plan:  Patient has chronic low back pain.  Combination of degenerative disc disease and degenerative facet arthropathy.  She is good to undergo physical therapy at [facility] tube try to diminish pain in the back and increase range of motion.      2. Primary osteoarthritis, unspecified site  Assessment & Plan:  Patient has arthritis in her left knee.  The medial joint line quite tender.  She has nearly full range of motion.  She will undergo surgical therapy at [facility] and ideally increase the strength of her quads and calf and will see improved range of motion and deep crease her joint line pain over the next few months.      3. History of total hip replacement, left  Assessment & Plan:  Patient had total left replacement a few weeks ago.  The wound is healed she has full range of motion and she is doing great.  She is starting to have pain in her knee and  back mainly because she is walking differently in the hip is doing wonderful.  I think this is not unusual situation and all she needed was keep working with physical therapy and she should be fine.        Follow Up {Instructions Charge Capture  Follow-up Communications :23}  Return in about 6 months (around 2/26/2022) for Recheck.  Patient was given instructions and counseling regarding her condition or for health maintenance advice. Please see specific information pulled into the AVS if appropriate.

## 2021-08-26 NOTE — ASSESSMENT & PLAN NOTE
Patient had total left replacement a few weeks ago.  The wound is healed she has full range of motion and she is doing great.  She is starting to have pain in her knee and back mainly because she is walking differently in the hip is doing wonderful.  I think this is not unusual situation and all she needed was keep working with physical therapy and she should be fine.

## 2021-08-30 ENCOUNTER — APPOINTMENT (OUTPATIENT)
Dept: BONE DENSITY | Facility: HOSPITAL | Age: 76
End: 2021-08-30

## 2021-10-07 RX ORDER — PANTOPRAZOLE SODIUM 40 MG/1
TABLET, DELAYED RELEASE ORAL
Qty: 90 TABLET | Refills: 3 | Status: SHIPPED | OUTPATIENT
Start: 2021-10-07 | End: 2022-02-20 | Stop reason: SDUPTHER

## 2021-11-02 DIAGNOSIS — M19.91 PRIMARY OSTEOARTHRITIS, UNSPECIFIED SITE: ICD-10-CM

## 2021-11-02 RX ORDER — HYDROCODONE BITARTRATE AND ACETAMINOPHEN 10; 325 MG/1; MG/1
TABLET ORAL
Qty: 360 TABLET | Refills: 0 | Status: SHIPPED | OUTPATIENT
Start: 2021-11-02 | End: 2022-02-18 | Stop reason: SDUPTHER

## 2021-11-05 ENCOUNTER — DOCUMENTATION (OUTPATIENT)
Dept: NEUROSURGERY | Facility: CLINIC | Age: 76
End: 2021-11-05

## 2021-11-05 DIAGNOSIS — M48.061 SPINAL STENOSIS OF LUMBAR REGION, UNSPECIFIED WHETHER NEUROGENIC CLAUDICATION PRESENT: Primary | ICD-10-CM

## 2021-11-05 NOTE — PROGRESS NOTES
New patient referral with imaging from September 2020.  This imaging was reviewed by myself and Dr. Shafer.  Dr. Shafer would like patient to obtain new imaging prior to appointment for possible surgical eval.

## 2021-11-09 ENCOUNTER — TELEPHONE (OUTPATIENT)
Dept: FAMILY MEDICINE CLINIC | Facility: CLINIC | Age: 76
End: 2021-11-09

## 2021-11-09 NOTE — TELEPHONE ENCOUNTER
Caller: JUANA VELASQUEZ    Relationship: Emergency Contact    Best call back number:[phone]    What orders are you requesting (i.e. lab or imaging): TDAP VACCINATION    In what timeframe would the patient need to come in: 11/10/2021 @ 4PM    Where will you receive your lab/imaging services: [facility]

## 2021-11-10 ENCOUNTER — TELEMEDICINE (OUTPATIENT)
Dept: FAMILY MEDICINE CLINIC | Facility: CLINIC | Age: 76
End: 2021-11-10

## 2021-11-10 VITALS
DIASTOLIC BLOOD PRESSURE: 70 MMHG | SYSTOLIC BLOOD PRESSURE: 130 MMHG | WEIGHT: 215 LBS | TEMPERATURE: 96.9 F | OXYGEN SATURATION: 98 % | HEART RATE: 84 BPM | BODY MASS INDEX: 33.74 KG/M2 | RESPIRATION RATE: 16 BRPM | HEIGHT: 67 IN

## 2021-11-10 DIAGNOSIS — G56.03 CARPAL TUNNEL SYNDROME ON BOTH SIDES: Primary | ICD-10-CM

## 2021-11-10 PROCEDURE — 99213 OFFICE O/P EST LOW 20 MIN: CPT | Performed by: FAMILY MEDICINE

## 2021-11-10 RX ORDER — CELECOXIB 200 MG/1
200 CAPSULE ORAL DAILY
COMMUNITY
Start: 2021-10-15 | End: 2022-06-06

## 2021-11-10 NOTE — PATIENT INSTRUCTIONS
Carpal Tunnel Syndrome    Carpal tunnel syndrome is a condition that causes pain, numbness, and weakness in your hand and fingers. The carpal tunnel is a narrow area located on the palm side of your wrist. Repeated wrist motion or certain diseases may cause swelling within the tunnel. This swelling pinches the main nerve in the wrist. The main nerve in the wrist is called the median nerve.  What are the causes?  This condition may be caused by:  · Repeated and forceful wrist and hand motions.  · Wrist injuries.  · Arthritis.  · A cyst or tumor in the carpal tunnel.  · Fluid buildup during pregnancy.  · Use of tools that vibrate.  Sometimes the cause of this condition is not known.  What increases the risk?  The following factors may make you more likely to develop this condition:  · Having a job that requires you to repeatedly or forcefully move your wrist or hand or requires you to use tools that vibrate. This may include jobs that involve using computers, working on an assembly line, or working with power tools such as drills or bhardwaj.  · Being a woman.  · Having certain conditions, such as:  ? Diabetes.  ? Obesity.  ? An underactive thyroid (hypothyroidism).  ? Kidney failure.  ? Rheumatoid arthritis.  What are the signs or symptoms?  Symptoms of this condition include:  · A tingling feeling in your fingers, especially in your thumb, index, and middle fingers.  · Tingling or numbness in your hand.  · An aching feeling in your entire arm, especially when your wrist and elbow are bent for a long time.  · Wrist pain that goes up your arm to your shoulder.  · Pain that goes down into your palm or fingers.  · A weak feeling in your hands. You may have trouble grabbing and holding items.  Your symptoms may feel worse during the night.  How is this diagnosed?  This condition is diagnosed with a medical history and physical exam. You may also have tests, including:  · Electromyogram (EMG). This test measures electrical  signals sent by your nerves into the muscles.  · Nerve conduction study. This test measures how well electrical signals pass through your nerves.  · Imaging tests, such as X-rays, ultrasound, and MRI. These tests check for possible causes of your condition.  How is this treated?  This condition may be treated with:  · Lifestyle changes. It is important to stop or change the activity that caused your condition.  · Doing exercise and activities to strengthen and stretch your muscles and tendons (physical therapy).  · Making lifestyle changes to help with your condition and learning how to do your daily activities safely (occupational therapy).  · Medicines for pain and inflammation. This may include medicine that is injected into your wrist.  · A wrist splint or brace.  · Surgery.  Follow these instructions at home:  If you have a splint or brace:  · Wear the splint or brace as told by your health care provider. Remove it only as told by your health care provider.  · Loosen the splint or brace if your fingers tingle, become numb, or turn cold and blue.  · Keep the splint or brace clean.  · If the splint or brace is not waterproof:  ? Do not let it get wet.  ? Cover it with a watertight covering when you take a bath or shower.  Managing pain, stiffness, and swelling  If directed, put ice on the painful area. To do this:  · If you have a removeable splint or brace, remove it as told by your health care provider.  · Put ice in a plastic bag.  · Place a towel between your skin and the bag or between the splint or brace and the bag.  · Leave the ice on for 20 minutes, 2-3 times a day. Do not fall asleep with the cold pack on your skin.  · Remove the ice if your skin turns bright red. This is very important. If you cannot feel pain, heat, or cold, you have a greater risk of damage to the area.  Move your fingers often to reduce stiffness and swelling.  General instructions  · Take over-the-counter and prescription medicines  only as told by your health care provider.  · Rest your wrist and hand from any activity that may be causing your pain. If your condition is work related, talk with your employer about changes that can be made, such as getting a wrist pad to use while typing.  · Do any exercises as told by your health care provider, physical therapist, or occupational therapist.  · Keep all follow-up visits. This is important.  Contact a health care provider if:  · You have new symptoms.  · Your pain is not controlled with medicines.  · Your symptoms get worse.  Get help right away if:  · You have severe numbness or tingling in your wrist or hand.  Summary  · Carpal tunnel syndrome is a condition that causes pain, numbness, and weakness in your hand and fingers.  · It is usually caused by repeated wrist motions.  · Lifestyle changes and medicines are used to treat carpal tunnel syndrome. Surgery may be recommended.  · Follow your health care provider's instructions about wearing a splint, resting from activity, keeping follow-up visits, and calling for help.  This information is not intended to replace advice given to you by your health care provider. Make sure you discuss any questions you have with your health care provider.  Document Revised: 04/29/2021 Document Reviewed: 04/29/2021  Elsevier Patient Education © 2021 [employer] Inc[employer]

## 2021-11-10 NOTE — PROGRESS NOTES
"Chief Complaint  Numbness    Subjective          Maylin Garcia presents to [facility]  For hand numbness that is getting worse.  Both hands are tingling in thumb, index and middle finger in both hands.    ok.  Not waking up with the tingling or pain.    No neck pain.  Some stiffness.        Objective   Vital Signs:   /70 (BP Location: Left arm)   Pulse 84   Temp 96.9 °F (36.1 °C) (Infrared)   Resp 16   Ht 170.2 cm (67\")   Wt 97.5 kg (215 lb)   SpO2 98%   BMI 33.67 kg/m²     Physical Exam  Vitals reviewed.   Musculoskeletal:         General: Deformity present. Normal range of motion.      Comments: Osteoarthritic changes in the elbows wrists and hands.  Positive Tinel's sign over the wrist bilaterally.   is strong.  No fasciculations.  No loss of muscle mass anywhere in the thenar/ hyporthenar area.        Result Review :                 Assessment and Plan    Diagnoses and all orders for this visit:    1. Carpal tunnel syndrome on both sides (Primary)  Assessment & Plan:  Patient has bilateral carpal tunnel syndrome.  She had to wear night splints.  She is going to take some vitamin B6 and vitamin B 1.  She is going to have an EMG and nerve conduction study performed.  She is going to be scheduled to see Kuntz Kleinert for possible injections into the wrist.  We will try to go conservative management but if down the road her symptoms get worse she will need to have surgical release of the carpal tunnel.    Orders:  -     EMG & Nerve Conduction Test; Future  -     Ambulatory Referral to Hand Surgery      Follow Up   Return in about 2 months (around 1/10/2022) for Recheck.  Patient was given instructions and counseling regarding her condition or for health maintenance advice. Please see specific information pulled into the AVS if appropriate.       "

## 2021-11-10 NOTE — ASSESSMENT & PLAN NOTE
Patient has bilateral carpal tunnel syndrome.  She had to wear night splints.  She is going to take some vitamin B6 and vitamin B 1.  She is going to have an EMG and nerve conduction study performed.  She is going to be scheduled to see Kuntz Kleinert for possible injections into the wrist.  We will try to go conservative management but if down the road her symptoms get worse she will need to have surgical release of the carpal tunnel.

## 2021-11-13 RX ORDER — CLONIDINE HYDROCHLORIDE 0.1 MG/1
0.1 TABLET ORAL 2 TIMES DAILY
Qty: 180 TABLET | Refills: 2 | Status: SHIPPED | OUTPATIENT
Start: 2021-11-13 | End: 2022-05-12 | Stop reason: SDUPTHER

## 2021-11-19 ENCOUNTER — APPOINTMENT (OUTPATIENT)
Dept: MRI IMAGING | Facility: HOSPITAL | Age: 76
End: 2021-11-19

## 2021-11-22 ENCOUNTER — TELEPHONE (OUTPATIENT)
Dept: NEUROSURGERY | Facility: CLINIC | Age: 76
End: 2021-11-22

## 2021-11-22 NOTE — TELEPHONE ENCOUNTER
Caller: Maylin Garcia    Relationship to patient: Self    Best call back number: [phone]  Patient is needing: PATIENT STATES THAT SHE WAS UNABLE TO MAKE HER MRI APPT AND SHE IS IN EXTREME PAIN, UNABLE TO FIND REFERRAL IN CHART,     CAN YOU SEND PATIENT ORDER TO [facility] AT FAX: [phone]    THEY HAVE AN OPENING FOR DEC 7TH.    PATIENT WOULD LIKE TO SCHEDULE FOR THAT.    PLEASE CALL THE PATIENT TO ADVISE @ [phone]    THANK YOU

## 2021-11-26 DIAGNOSIS — E78.2 MIXED HYPERLIPIDEMIA: ICD-10-CM

## 2021-11-26 DIAGNOSIS — G63 POLYNEUROPATHY ASSOCIATED WITH UNDERLYING DISEASE (HCC): Primary | ICD-10-CM

## 2021-11-26 DIAGNOSIS — E55.9 VITAMIN D DEFICIENCY, UNSPECIFIED: ICD-10-CM

## 2021-11-26 DIAGNOSIS — I10 PRIMARY HYPERTENSION: ICD-10-CM

## 2021-11-26 DIAGNOSIS — G56.03 CARPAL TUNNEL SYNDROME ON BOTH SIDES: ICD-10-CM

## 2021-11-26 DIAGNOSIS — E11.9 TYPE 2 DIABETES MELLITUS WITHOUT COMPLICATION, WITHOUT LONG-TERM CURRENT USE OF INSULIN (HCC): ICD-10-CM

## 2021-11-30 DIAGNOSIS — E66.3 OVERWEIGHT: ICD-10-CM

## 2021-12-01 ENCOUNTER — LAB (OUTPATIENT)
Dept: LAB | Facility: HOSPITAL | Age: 76
End: 2021-12-01

## 2021-12-01 DIAGNOSIS — E55.9 VITAMIN D DEFICIENCY, UNSPECIFIED: ICD-10-CM

## 2021-12-01 DIAGNOSIS — G56.03 CARPAL TUNNEL SYNDROME ON BOTH SIDES: ICD-10-CM

## 2021-12-01 DIAGNOSIS — E78.2 MIXED HYPERLIPIDEMIA: ICD-10-CM

## 2021-12-01 DIAGNOSIS — I10 PRIMARY HYPERTENSION: ICD-10-CM

## 2021-12-01 DIAGNOSIS — E11.9 TYPE 2 DIABETES MELLITUS WITHOUT COMPLICATION, WITHOUT LONG-TERM CURRENT USE OF INSULIN (HCC): ICD-10-CM

## 2021-12-01 DIAGNOSIS — G63 POLYNEUROPATHY ASSOCIATED WITH UNDERLYING DISEASE (HCC): ICD-10-CM

## 2021-12-01 LAB
25(OH)D3 SERPL-MCNC: 40 NG/ML
ALBUMIN SERPL-MCNC: 4.9 G/DL (ref 3.5–5.2)
ALBUMIN/GLOB SERPL: 1.8 G/DL
ALP SERPL-CCNC: 71 U/L (ref 39–117)
ALT SERPL W P-5'-P-CCNC: 44 U/L (ref 1–33)
ANION GAP SERPL CALCULATED.3IONS-SCNC: 8.2 MMOL/L (ref 5–15)
AST SERPL-CCNC: 24 U/L (ref 1–32)
BASOPHILS # BLD AUTO: 0.04 10*3/MM3 (ref 0–0.2)
BASOPHILS NFR BLD AUTO: 0.8 % (ref 0–1.5)
BILIRUB SERPL-MCNC: 0.6 MG/DL (ref 0–1.2)
BUN SERPL-MCNC: 17 MG/DL (ref 8–23)
BUN/CREAT SERPL: 20.5 (ref 7–25)
CALCIUM SPEC-SCNC: 9.9 MG/DL (ref 8.6–10.5)
CHLORIDE SERPL-SCNC: 102 MMOL/L (ref 98–107)
CHOLEST SERPL-MCNC: 141 MG/DL (ref 0–200)
CHROMATIN AB SERPL-ACNC: <10 IU/ML (ref 0–14)
CO2 SERPL-SCNC: 30.8 MMOL/L (ref 22–29)
CREAT SERPL-MCNC: 0.83 MG/DL (ref 0.57–1)
CRP SERPL-MCNC: <0.3 MG/DL (ref 0–0.5)
DEPRECATED RDW RBC AUTO: 46.8 FL (ref 37–54)
EOSINOPHIL # BLD AUTO: 0.12 10*3/MM3 (ref 0–0.4)
EOSINOPHIL NFR BLD AUTO: 2.4 % (ref 0.3–6.2)
ERYTHROCYTE [DISTWIDTH] IN BLOOD BY AUTOMATED COUNT: 13.9 % (ref 12.3–15.4)
ERYTHROCYTE [SEDIMENTATION RATE] IN BLOOD: 18 MM/HR (ref 0–30)
GFR SERPL CREATININE-BSD FRML MDRD: 67 ML/MIN/1.73
GLOBULIN UR ELPH-MCNC: 2.7 GM/DL
GLUCOSE SERPL-MCNC: 131 MG/DL (ref 65–99)
HBA1C MFR BLD: 6.2 % (ref 3.5–5.6)
HCT VFR BLD AUTO: 44.8 % (ref 34–46.6)
HDLC SERPL-MCNC: 44 MG/DL (ref 40–60)
HGB BLD-MCNC: 14.7 G/DL (ref 12–15.9)
IMM GRANULOCYTES # BLD AUTO: 0.02 10*3/MM3 (ref 0–0.05)
IMM GRANULOCYTES NFR BLD AUTO: 0.4 % (ref 0–0.5)
LDLC SERPL CALC-MCNC: 71 MG/DL (ref 0–100)
LDLC/HDLC SERPL: 1.53 {RATIO}
LYMPHOCYTES # BLD AUTO: 1.24 10*3/MM3 (ref 0.7–3.1)
LYMPHOCYTES NFR BLD AUTO: 24.5 % (ref 19.6–45.3)
MCH RBC QN AUTO: 30.3 PG (ref 26.6–33)
MCHC RBC AUTO-ENTMCNC: 32.8 G/DL (ref 31.5–35.7)
MCV RBC AUTO: 92.4 FL (ref 79–97)
MONOCYTES # BLD AUTO: 0.39 10*3/MM3 (ref 0.1–0.9)
MONOCYTES NFR BLD AUTO: 7.7 % (ref 5–12)
NEUTROPHILS NFR BLD AUTO: 3.26 10*3/MM3 (ref 1.7–7)
NEUTROPHILS NFR BLD AUTO: 64.2 % (ref 42.7–76)
NRBC BLD AUTO-RTO: 0 /100 WBC (ref 0–0.2)
PLATELET # BLD AUTO: 218 10*3/MM3 (ref 140–450)
PMV BLD AUTO: 10.7 FL (ref 6–12)
POTASSIUM SERPL-SCNC: 4.6 MMOL/L (ref 3.5–5.2)
PROT SERPL-MCNC: 7.6 G/DL (ref 6–8.5)
RBC # BLD AUTO: 4.85 10*6/MM3 (ref 3.77–5.28)
SODIUM SERPL-SCNC: 141 MMOL/L (ref 136–145)
T4 FREE SERPL-MCNC: 1.05 NG/DL (ref 0.93–1.7)
TRIGL SERPL-MCNC: 148 MG/DL (ref 0–150)
TSH SERPL DL<=0.05 MIU/L-ACNC: 2.35 UIU/ML (ref 0.27–4.2)
VIT B12 BLD-MCNC: 751 PG/ML (ref 211–946)
VLDLC SERPL-MCNC: 26 MG/DL (ref 5–40)
WBC NRBC COR # BLD: 5.07 10*3/MM3 (ref 3.4–10.8)

## 2021-12-01 PROCEDURE — 84439 ASSAY OF FREE THYROXINE: CPT

## 2021-12-01 PROCEDURE — 82607 VITAMIN B-12: CPT

## 2021-12-01 PROCEDURE — 84165 PROTEIN E-PHORESIS SERUM: CPT

## 2021-12-01 PROCEDURE — 85652 RBC SED RATE AUTOMATED: CPT

## 2021-12-01 PROCEDURE — 85025 COMPLETE CBC W/AUTO DIFF WBC: CPT

## 2021-12-01 PROCEDURE — 86431 RHEUMATOID FACTOR QUANT: CPT

## 2021-12-01 PROCEDURE — 86038 ANTINUCLEAR ANTIBODIES: CPT

## 2021-12-01 PROCEDURE — 80053 COMPREHEN METABOLIC PANEL: CPT

## 2021-12-01 PROCEDURE — 82306 VITAMIN D 25 HYDROXY: CPT

## 2021-12-01 PROCEDURE — 83036 HEMOGLOBIN GLYCOSYLATED A1C: CPT

## 2021-12-01 PROCEDURE — 36415 COLL VENOUS BLD VENIPUNCTURE: CPT

## 2021-12-01 PROCEDURE — 80061 LIPID PANEL: CPT

## 2021-12-01 PROCEDURE — 84155 ASSAY OF PROTEIN SERUM: CPT

## 2021-12-01 PROCEDURE — 84443 ASSAY THYROID STIM HORMONE: CPT

## 2021-12-01 PROCEDURE — 86140 C-REACTIVE PROTEIN: CPT

## 2021-12-01 RX ORDER — PHENTERMINE HYDROCHLORIDE 37.5 MG/1
37.5 TABLET ORAL DAILY
Qty: 30 TABLET | Refills: 1 | Status: SHIPPED | OUTPATIENT
Start: 2021-12-01 | End: 2022-04-21

## 2021-12-02 LAB
ALBUMIN SERPL ELPH-MCNC: 4.4 G/DL (ref 2.9–4.4)
ALBUMIN/GLOB SERPL: 1.4 {RATIO} (ref 0.7–1.7)
ALPHA1 GLOB SERPL ELPH-MCNC: 0.2 G/DL (ref 0–0.4)
ALPHA2 GLOB SERPL ELPH-MCNC: 0.7 G/DL (ref 0.4–1)
B-GLOBULIN SERPL ELPH-MCNC: 1.1 G/DL (ref 0.7–1.3)
GAMMA GLOB SERPL ELPH-MCNC: 1.1 G/DL (ref 0.4–1.8)
GLOBULIN SER CALC-MCNC: 3.1 G/DL (ref 2.2–3.9)
LABORATORY COMMENT REPORT: ABNORMAL
M PROTEIN SERPL ELPH-MCNC: 0.5 G/DL
PROT SERPL-MCNC: 7.5 G/DL (ref 6–8.5)

## 2021-12-03 LAB — ANA SER QL: NEGATIVE

## 2021-12-08 ENCOUNTER — TELEPHONE (OUTPATIENT)
Dept: FAMILY MEDICINE CLINIC | Facility: CLINIC | Age: 76
End: 2021-12-08

## 2021-12-08 NOTE — TELEPHONE ENCOUNTER
----- Message from Abiodun Busch MD sent at 12/8/2021  2:35 PM EST -----  Komal tell Maylin that for the most part her labs are okay.  I want her to repeat the protein electrophoresis in about 2 to 3 months and will see if that little trace of M protein goes away.  Its not a big problem but it is a protein that her body is making that we want to see go away.  Our body should not be making in proteins and releasing them into the bloodstream and she has just a very small dot of it so want to make sure it does not get worse.  If it does it is just a sign that her bone marrow is overactive and we need her to see a specialist in that area.  Most likely it will simply go away and it was just an aberrant test.  Everything else looks okay and of no concern right now.  She needs to stay active and continue to lose weight.

## 2021-12-08 NOTE — TELEPHONE ENCOUNTER
Caller: Maylin Velasquez    Relationship: Self    Best call back number: [phone]       What test was performed: LAB RESULTS     When was the test performed: A WEEK AGO     Where was the test performed: [facility]     Additional notes:     MS VELASQUEZ WOULD LIKE A CALL BACK REGARDING LAB RESULTS, SHE SAYS SHE RECEIVED COPY ON MY CHART, BUT WANTED TO GO OVER RESULTS

## 2021-12-08 NOTE — TELEPHONE ENCOUNTER
Caller: Jose Maylin SANJIV    Relationship: Self    Best call back number: [phone]     What test was performed:MRI LOWER SPINE     When was the test performed: 12/7/2021    Where was the test performed: [facility]     Additional notes:     MS VELASQUEZ WOULD LIKE THE MRI RESULTS UPLOADED TO MY CHART, WHEN DR. PUENTE REVIEWS.

## 2021-12-14 ENCOUNTER — TELEPHONE (OUTPATIENT)
Dept: NEUROSURGERY | Facility: CLINIC | Age: 76
End: 2021-12-14

## 2021-12-14 NOTE — TELEPHONE ENCOUNTER
Caller: Maylin Garcia    Relationship: Self    Best call back number: [phone]    What is the best time to reach you: ANYTIME    Who are you requesting to speak with (clinical staff, provider,  specific staff member): PROVIDER    What was the call regarding: IMAGING RESULTS / NEXT STEPS    Do you require a callback: YES    PATIENT HAD XR AND MRI 12/07/21 @ [facility] (REPORTS IN CHART), STATES SHE HAS NOT HAD A CALL BACK ABOUT HER RESULTS. SHE WOULD LIKE A CALL BACK ABOUT HER NEXT STEPS. SHE WOULD ALSO LIKE TO DISCUSS GETTING A BRACE TO RELIEVE SOME OF HER PAIN IN THE MEANTIME. PLEASE ADVISE. THANK YOU.

## 2021-12-17 NOTE — PROGRESS NOTES
"Subjective   History of Present Illness: Maylin Garcia is a 76 y.o. female long history of difficulty with standing and walking.  Patient says that she has tightness and discomfort in her legs and low back while standing and also while walking.  She has noticed a decline over the course of a long period of time.  She recently underwent hip surgery, which did not provide significant improvement in her mobility.  She denies any significant pain radiating down her legs.  Some intermittent numbness in the left lower extremity.  Patient has undergone epidural steroid injections and ablation without significant long-lasting improvement.  She has also been doing physical therapy including water therapy which does provide some relief.      Previous Treatment: Physical therapy, epidural steroid injections and ablations    The following portions of the patient's history were reviewed and updated as appropriate: allergies, current medications, past family history, past medical history, past social history, past surgical history and problem list.    Review of Systems   Constitutional: Positive for activity change.   HENT: Negative.    Eyes: Negative.    Respiratory: Negative.  Negative for chest tightness and shortness of breath.    Cardiovascular: Negative.  Negative for chest pain.   Gastrointestinal: Negative.    Endocrine: Negative.    Genitourinary: Negative.         Occasional urinary incontinence x 10 yrs      Musculoskeletal: Positive for back pain ( with stiffness/ pt states that it feels like a board ), gait problem ( walks with a cane) and myalgias.   Skin: Negative.    Allergic/Immunologic: Negative.    Neurological: Positive for numbness ( with tingling in hands and feet. Hx or carpal tunnel and artritis ). Negative for weakness.   Hematological: Negative.        Objective     /66   Pulse 74   Temp 97.5 °F (36.4 °C)   Resp 16   Ht 170.2 cm (67\")   Wt 92.1 kg (203 lb)   SpO2 97%   BMI 31.79 kg/m²  "   Body mass index is 31.79 kg/m².      Neurologic Exam     Mental Status   Oriented to person, place, and time.     Motor Exam     Strength   Strength 5/5 throughout.     Sensory Exam   Light touch normal.       Assessment/Plan   Independent Review of Radiographic Studies:      I personally reviewed and interpreted the images from the following studies.    MRI lumbar spine: Moderate to severe central stenosis from L2-L4.  There is mild spondylolisthesis of L4-5 with no other high-grade central or foraminal stenosis throughout.  Significant disc degeneration throughout.  Mild scoliosis.    Flexion-extension x-rays: Redemonstration of mild spondylolisthesis without any dynamic instability.  There is a mismatch of lumbar lordosis to pelvic incidence    Medical Decision Making:      Maylin Garcia is a 76 y.o. female with a history of progressive symptoms of neurogenic claudication and severe central stenosis from L2-4 on MRI.  I believe the patient's lumbar stenosis is her primary symptom generator especially given the fact that she has already had hip surgery and that is she has been taken care of.  She has failed conservative measures, and given this I recommend L2-4 minimally invasive decompression.  The patient will consider surgery, and call us if she would like to proceed.      There are no diagnoses linked to this encounter.  No follow-ups on file.    This patient was examined wearing appropriate personal protective equipment.                      Dr. Jaquan Shafer IV    12/20/21  15:01 EST

## 2021-12-20 ENCOUNTER — OFFICE VISIT (OUTPATIENT)
Dept: NEUROSURGERY | Facility: CLINIC | Age: 76
End: 2021-12-20

## 2021-12-20 VITALS
HEART RATE: 74 BPM | HEIGHT: 67 IN | SYSTOLIC BLOOD PRESSURE: 138 MMHG | BODY MASS INDEX: 31.86 KG/M2 | TEMPERATURE: 97.5 F | DIASTOLIC BLOOD PRESSURE: 66 MMHG | OXYGEN SATURATION: 97 % | RESPIRATION RATE: 16 BRPM | WEIGHT: 203 LBS

## 2021-12-20 DIAGNOSIS — M48.062 LUMBAR STENOSIS WITH NEUROGENIC CLAUDICATION: Primary | ICD-10-CM

## 2021-12-20 DIAGNOSIS — M51.36 LUMBAR DEGENERATIVE DISC DISEASE: ICD-10-CM

## 2021-12-20 DIAGNOSIS — M43.16 SPONDYLOLISTHESIS OF LUMBAR REGION: ICD-10-CM

## 2021-12-20 PROCEDURE — 99204 OFFICE O/P NEW MOD 45 MIN: CPT | Performed by: NEUROLOGICAL SURGERY

## 2021-12-20 RX ORDER — UBIDECARENONE 75 MG
50 CAPSULE ORAL DAILY
COMMUNITY
End: 2022-05-13

## 2021-12-20 RX ORDER — CHLORAL HYDRATE 500 MG
CAPSULE ORAL
COMMUNITY
End: 2022-05-13

## 2021-12-20 RX ORDER — LANOLIN ALCOHOL/MO/W.PET/CERES
400 CREAM (GRAM) TOPICAL DAILY
COMMUNITY
End: 2023-02-22

## 2022-01-28 RX ORDER — AMLODIPINE BESYLATE 10 MG/1
TABLET ORAL
Qty: 90 TABLET | Refills: 3 | Status: SHIPPED | OUTPATIENT
Start: 2022-01-28 | End: 2023-01-23

## 2022-02-18 ENCOUNTER — TELEPHONE (OUTPATIENT)
Dept: FAMILY MEDICINE CLINIC | Facility: CLINIC | Age: 77
End: 2022-02-18

## 2022-02-18 DIAGNOSIS — M19.91 PRIMARY OSTEOARTHRITIS, UNSPECIFIED SITE: ICD-10-CM

## 2022-02-21 RX ORDER — GLIMEPIRIDE 1 MG/1
1 TABLET ORAL
Qty: 90 TABLET | Refills: 1 | Status: SHIPPED | OUTPATIENT
Start: 2022-02-21 | End: 2022-08-29

## 2022-02-21 RX ORDER — ATORVASTATIN CALCIUM 10 MG/1
10 TABLET, FILM COATED ORAL DAILY
Qty: 90 TABLET | Refills: 1 | Status: SHIPPED | OUTPATIENT
Start: 2022-02-21 | End: 2022-08-29

## 2022-02-21 RX ORDER — BROMPHENIRAMINE MALEATE, PSEUDOEPHEDRINE HYDROCHLORIDE, AND DEXTROMETHORPHAN HYDROBROMIDE 2; 30; 10 MG/5ML; MG/5ML; MG/5ML
5-10 SYRUP ORAL 4 TIMES DAILY PRN
Qty: 120 ML | Refills: 2 | Status: SHIPPED | OUTPATIENT
Start: 2022-02-21 | End: 2022-02-28

## 2022-02-21 RX ORDER — AZITHROMYCIN 500 MG/1
500 TABLET, FILM COATED ORAL DAILY
Qty: 5 TABLET | Refills: 1 | Status: SHIPPED | OUTPATIENT
Start: 2022-02-21 | End: 2022-02-23 | Stop reason: SDUPTHER

## 2022-02-21 RX ORDER — HYDROCODONE BITARTRATE AND ACETAMINOPHEN 10; 325 MG/1; MG/1
TABLET ORAL
Qty: 360 TABLET | Refills: 0 | Status: SHIPPED | OUTPATIENT
Start: 2022-02-21 | End: 2022-04-15 | Stop reason: SDUPTHER

## 2022-02-21 RX ORDER — PANTOPRAZOLE SODIUM 40 MG/1
40 TABLET, DELAYED RELEASE ORAL DAILY
Qty: 90 TABLET | Refills: 3 | Status: SHIPPED | OUTPATIENT
Start: 2022-02-21 | End: 2022-07-30 | Stop reason: SDUPTHER

## 2022-02-21 NOTE — TELEPHONE ENCOUNTER
Caller: Daveyury Maylin KINCAID    Relationship: Self    Best call back number: [phone]     Requested Prescriptions:   Requested Prescriptions     Pending Prescriptions Disp Refills   • atorvastatin (Lipitor) 10 MG tablet       Sig: Take 1 tablet by mouth Daily.   • glimepiride (AMARYL) 1 MG tablet       Sig: Take 1 tablet by mouth Every Morning Before Breakfast.        Pharmacy where request should be sent: [facility] - [address] [phone] [facility] [phone]      Additional details provided by patient: MAYLIN WOULD LIKE TO KNOW WHY SHE HAS LOCKS ON HER PRESCRIPTIONS IN [employer], SHE HAS ALREADY CALLED MY CHART HELP DESK, REFERRED HER TO OFFICE. PLEASE ADVISE.      SHE HAS LESS THAN 5 DAYS OF MEDICATION REMAINING , 90 DAY SUPPLY REQUESTED     Does the patient have less than a 3 day supply:  [] Yes  [x] No    Katelyn Ashraf   02/21/22 08:36 EST

## 2022-02-23 ENCOUNTER — TELEPHONE (OUTPATIENT)
Dept: FAMILY MEDICINE CLINIC | Facility: CLINIC | Age: 77
End: 2022-02-23

## 2022-02-23 RX ORDER — AZITHROMYCIN 500 MG/1
500 TABLET, FILM COATED ORAL DAILY
Qty: 5 TABLET | Refills: 1 | Status: SHIPPED | OUTPATIENT
Start: 2022-02-23 | End: 2022-02-28

## 2022-02-23 RX ORDER — AZITHROMYCIN 500 MG/1
500 TABLET, FILM COATED ORAL DAILY
Qty: 5 TABLET | Refills: 1 | Status: SHIPPED | OUTPATIENT
Start: 2022-02-23 | End: 2022-02-23

## 2022-02-23 NOTE — TELEPHONE ENCOUNTER
Caller: Maylin Garcia    Relationship: Self    Best call back number: [phone]    Requested Prescriptions:   Requested Prescriptions     Pending Prescriptions Disp Refills   • azithromycin (Zithromax) 500 MG tablet 5 tablet 1     Sig: Take 1 tablet by mouth Daily for 5 days.        Pharmacy where request should be sent: [id number] - [place], [place] - [address] [facility] - [phone] [facility] [phone] FX     Additional details provided by patient:  PATIENT STILL HAS CHEST CONGESTION AND VOICE LOSS    Does the patient have less than a 3 day supply:  [x] Yes  [] No    Francine Miranda Rep   02/23/22 10:14 EST

## 2022-03-03 ENCOUNTER — TELEPHONE (OUTPATIENT)
Dept: FAMILY MEDICINE CLINIC | Facility: CLINIC | Age: 77
End: 2022-03-03

## 2022-03-03 NOTE — TELEPHONE ENCOUNTER
Left detailed message on patient's voice mail at 1:24pm that [facility] has nothing available between now and the fall for a Medicare Wellness Exam.  Next available, at this moment, is 9/2/2022 and if interested to call us back.  If having a problem, can be seen sooner for that problem.

## 2022-03-03 NOTE — TELEPHONE ENCOUNTER
Caller: JUANA VELASQUEZ    Relationship to patient: Emergency Contact    Best call back number: [phone]    Type of visit: MEDICARE WELLNESS    Requested date: ANY DAY OTHER THAN Tuesday OR Thursday . AUGUST WAS FIRST AVAILABLE. CAN WE WORK  AND WIFE TOGETHER?  MRN: MRN:   [id number]      If rescheduling, when is the original appointment: 3/10/22

## 2022-03-03 NOTE — TELEPHONE ENCOUNTER
They can be back to back but if that is how far out a wellness exam is then that's when it is they can be seen sooner if there is a problem

## 2022-03-04 RX ORDER — FLUOXETINE HYDROCHLORIDE 20 MG/1
CAPSULE ORAL
Qty: 90 CAPSULE | Refills: 3 | Status: SHIPPED | OUTPATIENT
Start: 2022-03-04 | End: 2022-06-23

## 2022-03-10 ENCOUNTER — OFFICE VISIT (OUTPATIENT)
Dept: FAMILY MEDICINE CLINIC | Facility: CLINIC | Age: 77
End: 2022-03-10

## 2022-03-10 ENCOUNTER — LAB (OUTPATIENT)
Dept: FAMILY MEDICINE CLINIC | Facility: CLINIC | Age: 77
End: 2022-03-10

## 2022-03-10 VITALS
HEIGHT: 67 IN | SYSTOLIC BLOOD PRESSURE: 140 MMHG | HEART RATE: 75 BPM | OXYGEN SATURATION: 96 % | DIASTOLIC BLOOD PRESSURE: 70 MMHG | WEIGHT: 217 LBS | RESPIRATION RATE: 16 BRPM | BODY MASS INDEX: 34.06 KG/M2 | TEMPERATURE: 97.7 F

## 2022-03-10 DIAGNOSIS — Z11.59 NEED FOR HEPATITIS C SCREENING TEST: ICD-10-CM

## 2022-03-10 DIAGNOSIS — E55.9 VITAMIN D DEFICIENCY, UNSPECIFIED: ICD-10-CM

## 2022-03-10 DIAGNOSIS — Z13.29 SCREENING FOR HYPOTHYROIDISM: ICD-10-CM

## 2022-03-10 DIAGNOSIS — E78.2 MIXED HYPERLIPIDEMIA: ICD-10-CM

## 2022-03-10 DIAGNOSIS — M19.91 PRIMARY OSTEOARTHRITIS, UNSPECIFIED SITE: ICD-10-CM

## 2022-03-10 DIAGNOSIS — R77.8 ABNORMAL SERUM PROTEIN ELECTROPHORESIS: ICD-10-CM

## 2022-03-10 DIAGNOSIS — Z13.820 OSTEOPOROSIS SCREENING: ICD-10-CM

## 2022-03-10 DIAGNOSIS — E11.9 TYPE 2 DIABETES MELLITUS WITHOUT COMPLICATION, WITHOUT LONG-TERM CURRENT USE OF INSULIN: ICD-10-CM

## 2022-03-10 DIAGNOSIS — Z00.00 MEDICARE ANNUAL WELLNESS VISIT, SUBSEQUENT: Primary | ICD-10-CM

## 2022-03-10 LAB
25(OH)D3 SERPL-MCNC: 38.4 NG/ML (ref 30–100)
ALBUMIN SERPL-MCNC: 5.1 G/DL (ref 3.5–5.2)
ALBUMIN UR-MCNC: 1.9 MG/DL
ALBUMIN/GLOB SERPL: 2 G/DL
ALP SERPL-CCNC: 73 U/L (ref 39–117)
ALT SERPL W P-5'-P-CCNC: 30 U/L (ref 1–33)
ANION GAP SERPL CALCULATED.3IONS-SCNC: 9.7 MMOL/L (ref 5–15)
AST SERPL-CCNC: 21 U/L (ref 1–32)
BASOPHILS # BLD AUTO: 0.04 10*3/MM3 (ref 0–0.2)
BASOPHILS NFR BLD AUTO: 0.8 % (ref 0–1.5)
BILIRUB SERPL-MCNC: 0.5 MG/DL (ref 0–1.2)
BILIRUB UR QL STRIP: NEGATIVE
BUN SERPL-MCNC: 23 MG/DL (ref 8–23)
BUN/CREAT SERPL: 21.1 (ref 7–25)
CALCIUM SPEC-SCNC: 9.9 MG/DL (ref 8.6–10.5)
CHLORIDE SERPL-SCNC: 102 MMOL/L (ref 98–107)
CHOLEST SERPL-MCNC: 139 MG/DL (ref 0–200)
CLARITY UR: CLEAR
CO2 SERPL-SCNC: 29.3 MMOL/L (ref 22–29)
COLOR UR: YELLOW
CREAT SERPL-MCNC: 1.09 MG/DL (ref 0.57–1)
DEPRECATED RDW RBC AUTO: 48.9 FL (ref 37–54)
EGFRCR SERPLBLD CKD-EPI 2021: 53.4 ML/MIN/1.73
EOSINOPHIL # BLD AUTO: 0.1 10*3/MM3 (ref 0–0.4)
EOSINOPHIL NFR BLD AUTO: 1.9 % (ref 0.3–6.2)
ERYTHROCYTE [DISTWIDTH] IN BLOOD BY AUTOMATED COUNT: 13.6 % (ref 12.3–15.4)
GLOBULIN UR ELPH-MCNC: 2.5 GM/DL
GLUCOSE SERPL-MCNC: 91 MG/DL (ref 65–99)
GLUCOSE UR STRIP-MCNC: NEGATIVE MG/DL
HBA1C MFR BLD: 6.1 % (ref 3.5–5.6)
HCT VFR BLD AUTO: 43.4 % (ref 34–46.6)
HCV AB SER DONR QL: NORMAL
HDLC SERPL-MCNC: 37 MG/DL (ref 40–60)
HGB BLD-MCNC: 14 G/DL (ref 12–15.9)
HGB UR QL STRIP.AUTO: NEGATIVE
IMM GRANULOCYTES # BLD AUTO: 0.03 10*3/MM3 (ref 0–0.05)
IMM GRANULOCYTES NFR BLD AUTO: 0.6 % (ref 0–0.5)
KETONES UR QL STRIP: ABNORMAL
LDLC SERPL CALC-MCNC: 73 MG/DL (ref 0–100)
LDLC/HDLC SERPL: 1.84 {RATIO}
LEUKOCYTE ESTERASE UR QL STRIP.AUTO: NEGATIVE
LYMPHOCYTES # BLD AUTO: 1.54 10*3/MM3 (ref 0.7–3.1)
LYMPHOCYTES NFR BLD AUTO: 29.7 % (ref 19.6–45.3)
MCH RBC QN AUTO: 31.2 PG (ref 26.6–33)
MCHC RBC AUTO-ENTMCNC: 32.3 G/DL (ref 31.5–35.7)
MCV RBC AUTO: 96.7 FL (ref 79–97)
MONOCYTES # BLD AUTO: 0.39 10*3/MM3 (ref 0.1–0.9)
MONOCYTES NFR BLD AUTO: 7.5 % (ref 5–12)
NEUTROPHILS NFR BLD AUTO: 3.09 10*3/MM3 (ref 1.7–7)
NEUTROPHILS NFR BLD AUTO: 59.5 % (ref 42.7–76)
NITRITE UR QL STRIP: NEGATIVE
NRBC BLD AUTO-RTO: 0 /100 WBC (ref 0–0.2)
PH UR STRIP.AUTO: 5.5 [PH] (ref 5–8)
PLATELET # BLD AUTO: 224 10*3/MM3 (ref 140–450)
PMV BLD AUTO: 11 FL (ref 6–12)
POTASSIUM SERPL-SCNC: 4.2 MMOL/L (ref 3.5–5.2)
PROT SERPL-MCNC: 7.6 G/DL (ref 6–8.5)
PROT UR QL STRIP: NEGATIVE
RBC # BLD AUTO: 4.49 10*6/MM3 (ref 3.77–5.28)
SODIUM SERPL-SCNC: 141 MMOL/L (ref 136–145)
SP GR UR STRIP: 1.03 (ref 1–1.03)
TRIGL SERPL-MCNC: 169 MG/DL (ref 0–150)
TSH SERPL DL<=0.05 MIU/L-ACNC: 3.74 UIU/ML (ref 0.27–4.2)
UROBILINOGEN UR QL STRIP: ABNORMAL
VLDLC SERPL-MCNC: 29 MG/DL (ref 5–40)
WBC NRBC COR # BLD: 5.19 10*3/MM3 (ref 3.4–10.8)

## 2022-03-10 PROCEDURE — 84443 ASSAY THYROID STIM HORMONE: CPT | Performed by: FAMILY MEDICINE

## 2022-03-10 PROCEDURE — 85025 COMPLETE CBC W/AUTO DIFF WBC: CPT | Performed by: FAMILY MEDICINE

## 2022-03-10 PROCEDURE — 83036 HEMOGLOBIN GLYCOSYLATED A1C: CPT | Performed by: FAMILY MEDICINE

## 2022-03-10 PROCEDURE — 86803 HEPATITIS C AB TEST: CPT | Performed by: FAMILY MEDICINE

## 2022-03-10 PROCEDURE — 1160F RVW MEDS BY RX/DR IN RCRD: CPT | Performed by: FAMILY MEDICINE

## 2022-03-10 PROCEDURE — 82043 UR ALBUMIN QUANTITATIVE: CPT | Performed by: FAMILY MEDICINE

## 2022-03-10 PROCEDURE — 80061 LIPID PANEL: CPT | Performed by: FAMILY MEDICINE

## 2022-03-10 PROCEDURE — 82306 VITAMIN D 25 HYDROXY: CPT | Performed by: FAMILY MEDICINE

## 2022-03-10 PROCEDURE — G0439 PPPS, SUBSEQ VISIT: HCPCS | Performed by: FAMILY MEDICINE

## 2022-03-10 PROCEDURE — 99213 OFFICE O/P EST LOW 20 MIN: CPT | Performed by: FAMILY MEDICINE

## 2022-03-10 PROCEDURE — 81003 URINALYSIS AUTO W/O SCOPE: CPT | Performed by: FAMILY MEDICINE

## 2022-03-10 PROCEDURE — 80053 COMPREHEN METABOLIC PANEL: CPT | Performed by: FAMILY MEDICINE

## 2022-03-10 PROCEDURE — 1170F FXNL STATUS ASSESSED: CPT | Performed by: FAMILY MEDICINE

## 2022-03-10 PROCEDURE — 84165 PROTEIN E-PHORESIS SERUM: CPT | Performed by: FAMILY MEDICINE

## 2022-03-10 PROCEDURE — 36415 COLL VENOUS BLD VENIPUNCTURE: CPT | Performed by: FAMILY MEDICINE

## 2022-03-10 NOTE — PROGRESS NOTES
The ABCs of the Annual Wellness Visit  Subsequent Medicare Wellness Visit    Chief Complaint   Patient presents with   • Medicare Wellness-subsequent      Subjective    History of Present Illness:  Maylin Garcia is a 76 y.o. female who presents for a Subsequent Medicare Wellness Visit.  Upon arrival to the room the patient underwent the Medicare health risk assessment.  Neither the questions themselves or the answers that were given prompted any major concern on the part of the patient or by the medical staff that gave the assessment.  As far as the preventative care examinations and the preventative care immunizations that this patient requires they are as listed below.     She is accompanied by her .    The patient states that she is doing well overall. She reports that her energy level is good. The patient states that she still has her uterus and ovaries. She has a Pap smear or pelvic exam every year. The patient states that she is up to date on her mammogram. She notes that she gets her eyes examined regularly. The patient denies any changes in her diabetes. She states that her foot exams have been doing well. The patient states that her hands are now arthritic. She notes that she sees a dentist regularly. The patient states that she is trying to follow a low carb diet. She states that she does eat pizza or ice cream on occasion. The patient states that she is trying to get more active to keep her sugars down.    The patient states that she has received both doses of her pneumonia vaccine. She states that she has received her Prevnar vaccine. The patient states that she has also received her shingles vaccine. The patient states that she has received her COVID-19 vaccine.    Screening tests recommended:    Colonoscopy UTD  Mammogram UTD  DEXA  Ordered on 6/32/21  PAP/ Pelvic--UTD  Diabetic eye exam--normal  Diabetic foot exam-normal  Dentist-up to date  Podiatrist- see 2x/  year.        Immunization:  Influenza  UTD  Prevnar UTD  Pneumovax  UTD  Tetanus  UTD  Shingles vaccine UTD  Hepatitis UTD  Covid     UTD             The following portions of the patient's history were reviewed and   updated as appropriate: allergies, current medications, past family history, past medical history, past social history, past surgical history and problem list.    Compared to one year ago, the patient feels her physical   health is the same.    Compared to one year ago, the patient feels her mental   health is the same.    Recent Hospitalizations:  She was not admitted to the hospital during the last year.       Current Medical Providers:  Patient Care Team:  Abiodun Busch MD as PCP - General (Family Medicine)    Outpatient Medications Prior to Visit   Medication Sig Dispense Refill   • amLODIPine (NORVASC) 10 MG tablet TAKE 1 TABLET DAILY 90 tablet 3   • atorvastatin (Lipitor) 10 MG tablet Take 1 tablet by mouth Daily. 90 tablet 1   • Calcium Carb-Cholecalciferol (Calcium-Vitamin D3) 600-400 MG-UNIT capsule Take  by mouth Daily.     • celecoxib (CeleBREX) 200 MG capsule      • cloNIDine (Catapres) 0.1 MG tablet Take 1 tablet by mouth 2 (Two) Times a Day for 90 days. 180 tablet 2   • CRANBERRY EXTRACT PO Take  by mouth.     • D-Mannose 500 MG capsule Take 2 capsules by mouth Daily.     • ferrous gluconate (FERGON) 240 (27 FE) MG tablet Take  by mouth Daily With Breakfast.     • fexofenadine (ALLEGRA) 180 MG tablet Take 180 mg by mouth Daily.     • FLUoxetine (PROzac) 20 MG capsule TAKE 1 CAPSULE DAILY 90 capsule 3   • folic acid (FOLVITE) 400 MCG tablet Take 400 mcg by mouth Daily.     • glimepiride (AMARYL) 1 MG tablet Take 1 tablet by mouth Every Morning Before Breakfast. (Patient taking differently: Take 1 mg by mouth Every Morning Before Breakfast. Takes half tablet qhs) 90 tablet 1   • HYDROcodone-acetaminophen (NORCO)  MG per tablet Take 1 tbalet every 4-6 hours MAX 4 tablets per day  360 tablet 0   • metFORMIN (GLUCOPHAGE) 500 MG tablet TAKE 2 TABLETS TWICE A  tablet 3   • Multiple Vitamin (ONE-A-DAY ESSENTIAL) tablet Take  by mouth.     • Omega-3 Fatty Acids (fish oil) 1000 MG capsule capsule Take  by mouth Daily With Breakfast.     • pantoprazole (PROTONIX) 40 MG EC tablet Take 1 tablet by mouth Daily. 90 tablet 3   • phentermine (ADIPEX-P) 37.5 MG tablet TAKE 1 TABLET BY MOUTH DAILY 30 tablet 1   • quinapril (ACCUPRIL) 40 MG tablet TAKE 1 TABLET TWICE A  tablet 3   • Specialty Vitamins Products (Inulose Blood Sugar Support) capsule Take  by mouth. Natures nutrition Blood sugar support     • vitamin B-12 (CYANOCOBALAMIN) 100 MCG tablet Take 50 mcg by mouth Daily.       No facility-administered medications prior to visit.       Opioid medication/s are on active medication list.  and I have evaluated her active treatment plan and pain score trends (see table).  There were no vitals filed for this visit.  I have reviewed the chart for potential of high risk medication and harmful drug interactions in the elderly.            Aspirin is not on active medication list.  Aspirin use is not indicated based on review of current medical condition/s. Risk of harm outweighs potential benefits.  .    Patient Active Problem List   Diagnosis   • Asthmatic bronchitis   • Low back pain   • Gastro-esophageal reflux disease with esophagitis   • Hyperlipidemia   • Hypertension   • Osteoarthritis   • Overweight   • Type 2 diabetes mellitus (HCC)   • Medicare annual wellness visit, subsequent   • Vitamin D deficiency, unspecified    • Calculus of kidney   • Cataract   • Depressive disorder   • Gastroesophageal reflux disease   • Idiopathic thrombocytopenic purpura (HCC)   • Polyp of colon   • Chest pain   • History of total hip replacement, left   • Carpal tunnel syndrome on both sides   • Combined form of senile cataract   • Diabetes mellitus without complication (HCC)     Advance Care  Planning  Advance Directive is not on file.  ACP discussion was held with the patient during this visit. Patient has an advance directive (not in EMR), copy requested.    Review of Systems   Constitutional: Positive for fatigue. Negative for fever.   HENT: Positive for rhinorrhea. Negative for congestion, sore throat, trouble swallowing and voice change.         During her allergy seasons she will have congestion and rhinorrhea   Eyes: Negative.  Negative for redness and visual disturbance.   Respiratory: Positive for wheezing. Negative for cough, chest tightness and shortness of breath.         Intermittent wheezing when she is in the middle of her allergies.  Currently she is clear   Cardiovascular: Negative.  Negative for chest pain, palpitations and leg swelling.   Gastrointestinal: Negative.  Negative for abdominal distention, abdominal pain, anal bleeding, diarrhea and nausea.   Endocrine: Negative.  Negative for cold intolerance and heat intolerance.   Genitourinary: Negative.  Negative for difficulty urinating, dysuria, pelvic pain and vaginal bleeding.   Musculoskeletal: Positive for arthralgias and joint swelling.        Bilateral foot pain.  Most of the pain is in her toes and is likely related to the arthritic changes in most of her digits on both sides.  She has bilateral bunions with some redness and thinning of the skin over the bunion area.  There is no gross ulcer present.  Soreness at the first metatarsal phalangeal joint bilaterally.  Osteoarthritic changes in both ankles and knees   Skin: Negative.  Negative for rash.   Allergic/Immunologic: Positive for environmental allergies.   Neurological: Negative.  Negative for weakness and light-headedness.   Hematological: Negative.  Does not bruise/bleed easily.   Psychiatric/Behavioral: Negative.  Negative for agitation, decreased concentration and sleep disturbance. The patient is not hyperactive.    All other systems reviewed and are negative.      "  Objective    Vitals:    03/10/22 0911   BP: 140/70   BP Location: Right arm   Pulse: 75   Resp: 16   Temp: 97.7 °F (36.5 °C)   TempSrc: Infrared   SpO2: 96%   Weight: 98.4 kg (217 lb)   Height: 170.2 cm (67\")     BMI Readings from Last 1 Encounters:   03/10/22 33.99 kg/m²   BMI is above normal parameters. Recommendations include: exercise counseling and nutrition counseling    Does the patient have evidence of cognitive impairment? No    Physical Exam  Constitutional:       Appearance: Normal appearance. She is well-developed. She is obese.   HENT:      Head: Normocephalic and atraumatic.      Right Ear: Tympanic membrane, ear canal and external ear normal.      Left Ear: Tympanic membrane, ear canal and external ear normal.      Nose: Rhinorrhea present. No congestion.      Comments: Mild postnasal drip.  She has posterior pharyngeal lymphoid hyperplasia likely from the drainage.  All this is due to mild allergies.     Mouth/Throat:      Mouth: Mucous membranes are moist.      Pharynx: Oropharynx is clear. No oropharyngeal exudate.   Eyes:      Extraocular Movements: Extraocular movements intact.      Conjunctiva/sclera: Conjunctivae normal.      Pupils: Pupils are equal, round, and reactive to light.   Cardiovascular:      Rate and Rhythm: Normal rate and regular rhythm.      Pulses: Normal pulses.      Heart sounds: Normal heart sounds.   Pulmonary:      Effort: Pulmonary effort is normal.      Breath sounds: Wheezing present.      Comments: Scattered wheezes throughout both lung fields.  Especially with expiration  Abdominal:      General: Bowel sounds are normal.      Palpations: Abdomen is soft.   Musculoskeletal:         General: Tenderness and deformity present. Normal range of motion.      Cervical back: Normal range of motion and neck supple.      Right lower leg: Edema present.      Left lower leg: Edema present.      Comments: Patient has trace edema around the ankles and feet.  She has osteoarthritic " changes in the ankles and the toes.  She is osteoarthritic changes in the knees.  Her toes have some deformities 3 4 and 5 bilaterally due to osteoarthritis.  She has bilateral bunions.  No skin breakdown.  She has callus formation at the base of the metatarsals and over the bunions bilaterally.  She also has callus around the heels bilaterally.  The toes are sore to palpate and she has metatarsalgia bilaterally.  Ankles are little stiff and sore   Skin:     General: Skin is warm and dry.   Neurological:      General: No focal deficit present.      Mental Status: She is alert and oriented to person, place, and time. Mental status is at baseline.   Psychiatric:         Mood and Affect: Mood normal.         Behavior: Behavior normal.         Thought Content: Thought content normal.         Judgment: Judgment normal.       Lab Results   Component Value Date    TRIG 169 (H) 03/10/2022    HDL 37 (L) 03/10/2022    LDL 73 03/10/2022    VLDL 29 03/10/2022    HGBA1C 6.1 (H) 03/10/2022            HEALTH RISK ASSESSMENT    Smoking Status:  Social History     Tobacco Use   Smoking Status Former Smoker   • Packs/day: 1.50   • Years: 15.00   • Pack years: 22.50   • Types: Cigarettes   • Start date: 1967   • Quit date: 10/31/1981   • Years since quittin.3   Smokeless Tobacco Never Used   Tobacco Comment    Quit smoking 41 years ago     Alcohol Consumption:  Social History     Substance and Sexual Activity   Alcohol Use Never     Fall Risk Screen:    ELAINA Fall Risk Assessment was completed, and patient is at MODERATE risk for falls. Assessment completed on:3/10/2022    Depression Screening:  PHQ-2/PHQ-9 Depression Screening 3/10/2022   Retired Total Score -   Little Interest or Pleasure in Doing Things 0-->not at all   Feeling Down, Depressed or Hopeless 1-->several days   PHQ-9: Brief Depression Severity Measure Score 1       Health Habits and Functional and Cognitive Screening:  Functional & Cognitive Status 3/10/2022    Do you have difficulty preparing food and eating? No   Do you have difficulty bathing yourself, getting dressed or grooming yourself? No   Do you have difficulty using the toilet? No   Do you have difficulty moving around from place to place? Yes   Do you have trouble with steps or getting out of a bed or a chair? Yes   Current Diet Well Balanced Diet   Dental Exam Up to date   Eye Exam Up to date   Exercise (times per week) 2 times per week   Current Exercises Include Swimming   Current Exercise Activities Include -   Do you need help using the phone?  No   Are you deaf or do you have serious difficulty hearing?  No   Do you need help with transportation? No   Do you need help shopping? No   Do you need help preparing meals?  No   Do you need help with housework?  No   Do you need help with laundry? No   Do you need help taking your medications? No   Do you need help managing money? No   Do you ever drive or ride in a car without wearing a seat belt? No   Have you felt unusual stress, anger or loneliness in the last month? No   Who do you live with? Spouse   If you need help, do you have trouble finding someone available to you? No   Have you been bothered in the last four weeks by sexual problems? -   Do you have difficulty concentrating, remembering or making decisions? No       Age-appropriate Screening Schedule:  Refer to the list below for future screening recommendations based on patient's age, sex and/or medical conditions. Orders for these recommended tests are listed in the plan section. The patient has been provided with a written plan.    Health Maintenance   Topic Date Due   • DIABETIC FOOT EXAM  Never done   • DXA SCAN  07/04/2019   • HEMOGLOBIN A1C  09/10/2022   • DIABETIC EYE EXAM  10/20/2022   • LIPID PANEL  03/10/2023   • URINE MICROALBUMIN  03/10/2023   • MAMMOGRAM  06/12/2023   • TDAP/TD VACCINES (3 - Td or Tdap) 11/15/2031   • INFLUENZA VACCINE  Completed   • ZOSTER VACCINE  Completed               Assessment/Plan   CMS Preventative Services Quick Reference  Risk Factors Identified During Encounter  Immunizations Discussed/Encouraged (specific Immunizations; All immunizations utd  Obesity/Overweight   The above risks/problems have been discussed with the patient.  Follow up actions/plans if indicated are seen below in the Assessment/Plan Section.  Pertinent information has been shared with the patient in the After Visit Summary.    Diagnoses and all orders for this visit:    1. Medicare annual wellness visit, subsequent (Primary)        -Upon arrival to the room the patient underwent the Medicare health risk assessment.  Neither the questions themselves or the answers that were given prompted any major concern on the part of the patient or by the medical staff that gave the assessment.  As far as the preventative care examinations and the preventative care immunizations that this patient requires they are as listed below.   Screening tests recommended:    Colonoscopy UTD  Mammogram UTD  DEXA  Ordered on 6/32/21  PAP/ Pelvic--UTD  Diabetic eye exam--normal  Diabetic foot exam-normal  Dentist-up to date        Immunization:  Influenza  UTD  Prevnar UTD  Pneumovax  UTD  Tetanus  UTD  Shingles vaccine UTD  Hepatitis UTD  Covid     UTD                  2. Vitamin D deficiency, unspecified         - We will check her vitamin D level today and make adjustments if needed.    3. Type 2 diabetes mellitus without complication, without long-term current use of insulin (HCC)        - We will recheck her A1c today and make adjustments in her medicines if needed.         - She and her  try to follow a low carb diet and she is trying to get more active to keep her sugars down.    4. Mixed hyperlipidemia        - We will recheck her lipid panel today and she will continue her medications, which includes Lipitor 10 mg a day.    5. Screening for hypothyroidism        - The patient will have TSH and free T4 done today  and we will make sure that her thyroid levels are normal.         - If she needs replacement, then we can discuss that and begin that in the next day or two.    6. Need for hepatitis C screening test        - The patient has never been screened for hepatitis C.         - We are going to do that test one time in her life.    7. Osteoporosis screening        - The patient will have a DEXA scan, ordered.         - She hopefully will show good bone density, and not need any treatment.    8. Abnormal serum protein electrophoresis        - The patient had a protein electrophoresis that had an M band noted about 6 months ago.         - I am going to repeat that today and make sure this has not changed any.         - She had an M protein, but we will check that again today and make sure that it is not getting worse.    9.  Osteoarthritis         -   Patient has trace edema around the ankles and feet.  She has osteoarthritic changes in the ankles and the toes.  She is osteoarthritic changes in the knees.  Her toes have some deformities 3 4 and 5 bilaterally due to osteoarthritis.  She has bilateral bunions.  No skin breakdown.  She has callus formation at the base of the metatarsals and over the bunions bilaterally.  She also has callus around the heels bilaterally.  The toes are sore to palpate and she has metatarsalgia bilaterally.  Ankles are little stiff and sore         Follow Up:   Return in about 6 months (around 9/10/2022), or if symptoms worsen or fail to improve, for Recheck.     An After Visit Summary and PPPS were made available to the patient.    Transcribed from ambient dictation for Abiodun Busch MD by Efrain Huerta.  03/10/22   14:55 EST    Patient verbalized consent to the visit recording.  I have personally performed the services described in this document as transcribed by the above individual, and it is both accurate and complete.  Abiodun Busch MD  3/13/2022  12:39 EDT

## 2022-03-11 LAB
ALBUMIN SERPL ELPH-MCNC: 4.2 G/DL (ref 2.9–4.4)
ALBUMIN/GLOB SERPL: 1.3 {RATIO} (ref 0.7–1.7)
ALPHA1 GLOB SERPL ELPH-MCNC: 0.2 G/DL (ref 0–0.4)
ALPHA2 GLOB SERPL ELPH-MCNC: 0.8 G/DL (ref 0.4–1)
B-GLOBULIN SERPL ELPH-MCNC: 1.1 G/DL (ref 0.7–1.3)
GAMMA GLOB SERPL ELPH-MCNC: 1.1 G/DL (ref 0.4–1.8)
GLOBULIN SER CALC-MCNC: 3.3 G/DL (ref 2.2–3.9)
LABORATORY COMMENT REPORT: ABNORMAL
M PROTEIN SERPL ELPH-MCNC: 0.6 G/DL
PROT SERPL-MCNC: 7.5 G/DL (ref 6–8.5)

## 2022-03-14 ENCOUNTER — TELEPHONE (OUTPATIENT)
Dept: FAMILY MEDICINE CLINIC | Facility: CLINIC | Age: 77
End: 2022-03-14

## 2022-03-14 DIAGNOSIS — E11.9 TYPE 2 DIABETES MELLITUS WITHOUT COMPLICATION, WITHOUT LONG-TERM CURRENT USE OF INSULIN: Primary | ICD-10-CM

## 2022-03-14 NOTE — TELEPHONE ENCOUNTER
Gave message to patient at 2:15pm.  She will call back to schedule the [facility] lab appt.

## 2022-03-14 NOTE — TELEPHONE ENCOUNTER
----- Message from Abiodun Busch MD sent at 3/11/2022  7:36 PM EST -----  Komal tell Maylin that I want to repeat the protein electrophoresis again in about 4 months.  She still has a little bit of the M protein showing up and I want to see what it does over the next 4 months.  If it keeps showing up every time we check it I would have to get her to the hematology group and see what they think.  She is making some protein that is not usually produced by the bone marrow.  We will just  follow it for now --it is nothing dangerous --but I want it rechecked in 4 months

## 2022-04-13 ENCOUNTER — HOSPITAL ENCOUNTER (OUTPATIENT)
Dept: BONE DENSITY | Facility: HOSPITAL | Age: 77
Discharge: HOME | End: 2022-04-13
Admitting: FAMILY MEDICINE

## 2022-04-13 DIAGNOSIS — Z13.820 OSTEOPOROSIS SCREENING: ICD-10-CM

## 2022-04-13 PROCEDURE — 77080 DXA BONE DENSITY AXIAL: CPT

## 2022-04-14 DIAGNOSIS — M85.88 OSTEOPENIA OF LUMBAR SPINE: Primary | ICD-10-CM

## 2022-04-14 NOTE — PROGRESS NOTES
Komal tell Maylin that her bone density shows osteopenia.  I want her to sit down with Jaquan Mann who is the nurse practitioner that takes care of her osteopenic osteoporotic patients and she can formulate a plan for her to prevent progression.  In the meantime start on Caltrate 600 with vitamin D 2 a day.

## 2022-04-15 DIAGNOSIS — M19.91 PRIMARY OSTEOARTHRITIS, UNSPECIFIED SITE: ICD-10-CM

## 2022-04-18 ENCOUNTER — TELEPHONE (OUTPATIENT)
Dept: FAMILY MEDICINE CLINIC | Facility: CLINIC | Age: 77
End: 2022-04-18

## 2022-04-18 NOTE — TELEPHONE ENCOUNTER
Caller: Maylin Garcia    Relationship: Self    Best call back number: [phone]     Requested Prescriptions:    (CALCIUM PLUS D SUPPLLIMENT)      Pharmacy where request should be sent: [id number] - [place], [place] - [address] - [phone]  - [phone]     Additional details provided by patient: [email or web address], SHE WOULD LIKE A CALL BACK FROM [facility]    Does the patient have less than a 3 day supply:  [x] Yes  [] No    Katelyn Ashraf   04/18/22 14:11 EDT

## 2022-04-19 RX ORDER — HYDROCODONE BITARTRATE AND ACETAMINOPHEN 10; 325 MG/1; MG/1
TABLET ORAL
Qty: 360 TABLET | Refills: 0 | Status: SHIPPED | OUTPATIENT
Start: 2022-04-19 | End: 2022-08-11 | Stop reason: SDUPTHER

## 2022-04-19 NOTE — TELEPHONE ENCOUNTER
HUB TO SHARE    I left voicemail that [facility] on 4/14/22 at 4:39am and receit was confirmed. She needs to contact [facility] maybe her insurance does not cover this because it is an over the counter supplement.

## 2022-04-20 DIAGNOSIS — E66.3 OVERWEIGHT: ICD-10-CM

## 2022-04-21 RX ORDER — PHENTERMINE HYDROCHLORIDE 37.5 MG/1
37.5 TABLET ORAL DAILY
Qty: 30 TABLET | Refills: 2 | Status: SHIPPED | OUTPATIENT
Start: 2022-04-21 | End: 2022-06-16

## 2022-05-12 ENCOUNTER — TELEPHONE (OUTPATIENT)
Dept: FAMILY MEDICINE CLINIC | Facility: CLINIC | Age: 77
End: 2022-05-12

## 2022-05-12 RX ORDER — CLONIDINE HYDROCHLORIDE 0.1 MG/1
0.1 TABLET ORAL 2 TIMES DAILY
Qty: 180 TABLET | Refills: 2 | Status: SHIPPED | OUTPATIENT
Start: 2022-05-12 | End: 2022-08-10

## 2022-05-12 NOTE — TELEPHONE ENCOUNTER
\Caller: Maylin Garcia    Relationship: Self    Best call back number: [phone]    What medications are you currently taking:   Current Outpatient Medications on File Prior to Visit   Medication Sig Dispense Refill   • amLODIPine (NORVASC) 10 MG tablet TAKE 1 TABLET DAILY 90 tablet 3   • atorvastatin (Lipitor) 10 MG tablet Take 1 tablet by mouth Daily. 90 tablet 1   • calcium carbonate-vitamin d 600-400 MG-UNIT per tablet Take 1 tablet by mouth 2 (Two) Times a Day for 30 days. With meals 60 tablet 11   • celecoxib (CeleBREX) 200 MG capsule      • cloNIDine (Catapres) 0.1 MG tablet Take 1 tablet by mouth 2 (Two) Times a Day for 90 days. 180 tablet 2   • CRANBERRY EXTRACT PO Take  by mouth.     • D-Mannose 500 MG capsule Take 2 capsules by mouth Daily.     • ferrous gluconate (FERGON) 240 (27 FE) MG tablet Take  by mouth Daily With Breakfast.     • fexofenadine (ALLEGRA) 180 MG tablet Take 180 mg by mouth Daily.     • FLUoxetine (PROzac) 20 MG capsule TAKE 1 CAPSULE DAILY 90 capsule 3   • folic acid (FOLVITE) 400 MCG tablet Take 400 mcg by mouth Daily.     • glimepiride (AMARYL) 1 MG tablet Take 1 tablet by mouth Every Morning Before Breakfast. (Patient taking differently: Take 1 mg by mouth Every Morning Before Breakfast. Takes half tablet qhs) 90 tablet 1   • HYDROcodone-acetaminophen (NORCO)  MG per tablet Take 1 tbalet every 4-6 hours MAX 4 tablets per day 360 tablet 0   • metFORMIN (GLUCOPHAGE) 500 MG tablet TAKE 2 TABLETS TWICE A  tablet 3   • Multiple Vitamin (ONE-A-DAY ESSENTIAL) tablet Take  by mouth.     • Omega-3 Fatty Acids (fish oil) 1000 MG capsule capsule Take  by mouth Daily With Breakfast.     • pantoprazole (PROTONIX) 40 MG EC tablet Take 1 tablet by mouth Daily. 90 tablet 3   • phentermine (ADIPEX-P) 37.5 MG tablet TAKE 1 TABLET BY MOUTH DAILY 30 tablet 2   • quinapril (ACCUPRIL) 40 MG tablet TAKE 1 TABLET TWICE A  tablet 3   • Specialty Vitamins Products (Inulose Blood  Sugar Support) capsule Take  by mouth. Natures nutrition Blood sugar support     • vitamin B-12 (CYANOCOBALAMIN) 100 MCG tablet Take 50 mcg by mouth Daily.       No current facility-administered medications on file prior to visit.        Which medication are you concerned about:NEW BLOOD PRESSURE MEDICATION-DOES NOT RECALL NAME      REQUESTING A CALL TO DISCUSS    What are your concerns:   NEEDS TO GO TO [id number] [place] [facility] [place] [place] - [address] - [phone]  - [phone] FX  [phone]    REQUESTING TO TRANSFERED

## 2022-05-12 NOTE — TELEPHONE ENCOUNTER
Spoke with patient and she finally found it.  Needs an rx sent to [facility] at  for Clonidine 0.1mg twice daily 90 day supply.

## 2022-05-12 NOTE — TELEPHONE ENCOUNTER
Need to know the name? There is nothing new unless she means in January when amlodipine was prescribed.

## 2022-05-13 ENCOUNTER — TELEPHONE (OUTPATIENT)
Dept: ORTHOPEDIC SURGERY | Facility: CLINIC | Age: 77
End: 2022-05-13

## 2022-05-13 ENCOUNTER — TELEPHONE (OUTPATIENT)
Dept: FAMILY MEDICINE CLINIC | Facility: CLINIC | Age: 77
End: 2022-05-13

## 2022-05-13 ENCOUNTER — OFFICE VISIT (OUTPATIENT)
Dept: ORTHOPEDIC SURGERY | Facility: CLINIC | Age: 77
End: 2022-05-13

## 2022-05-13 VITALS
BODY MASS INDEX: 33.84 KG/M2 | HEIGHT: 67 IN | HEART RATE: 59 BPM | WEIGHT: 215.6 LBS | SYSTOLIC BLOOD PRESSURE: 130 MMHG | DIASTOLIC BLOOD PRESSURE: 71 MMHG

## 2022-05-13 DIAGNOSIS — M85.9 DISORDER OF BONE DENSITY AND STRUCTURE, UNSPECIFIED: Primary | ICD-10-CM

## 2022-05-13 DIAGNOSIS — J45.909 ASTHMATIC BRONCHITIS WITHOUT COMPLICATION, UNSPECIFIED ASTHMA SEVERITY, UNSPECIFIED WHETHER PERSISTENT: ICD-10-CM

## 2022-05-13 DIAGNOSIS — F32.A DEPRESSIVE DISORDER: ICD-10-CM

## 2022-05-13 DIAGNOSIS — Z82.62 FAMILY HX OSTEOPOROSIS: ICD-10-CM

## 2022-05-13 DIAGNOSIS — D69.3 IDIOPATHIC THROMBOCYTOPENIC PURPURA: ICD-10-CM

## 2022-05-13 DIAGNOSIS — E55.9 VITAMIN D DEFICIENCY: ICD-10-CM

## 2022-05-13 DIAGNOSIS — E11.9 TYPE 2 DIABETES MELLITUS WITHOUT COMPLICATION, WITHOUT LONG-TERM CURRENT USE OF INSULIN: ICD-10-CM

## 2022-05-13 PROCEDURE — 99203 OFFICE O/P NEW LOW 30 MIN: CPT | Performed by: PHYSICIAN ASSISTANT

## 2022-05-13 NOTE — TELEPHONE ENCOUNTER
Provider: LOUIE  Caller: JOSEPH    Phone Number:[phone]  Reason for Call: PLEASE CALL BACK TO CLARIFY A MILD CASE OF WHAT?  WAS UNSURE WHAT HER DX WAS.    PT HAS ASKED WE LAVE IT ON HER VOICE MAIL IF WE DO NOT GET HER

## 2022-05-13 NOTE — PROGRESS NOTES
ORTHOPEDIC VISIT    Referring Provider: Narayan  Primary Care Provider: Abiodun Busch MD         Subjective:  Chief Complaint:  Chief Complaint   Patient presents with   • Osteoporosis     New Consult       HPI:  Maylin Garcia is a 76 y.o. female who presents for initial visit for bone health evaluation.  The patient complains of Complains of: back pain, loss of height and Joint stiffness.  And denies Denies: generalized bone pain and Any significant fractures over the age of 50.  Their risk factors include risk factors: Vitamin D deficiency, Low sun exposure, Elevated falls risk, Hypogonadism and Family history of osteoporosis, medication use.  The patient has the following associated illnesses: Associated illnesses: Esophagitis, Gastritis, Vitamin D deficiency and Clotting disorder, ITP, diabetes mellitus type 2, depression, asthma, scoliosis.  Treatment to date has included Treatment to date: Calcium supplementation, Vitamin D supplementation and Weightbearing exercise.  The patient has never taken any osteoporosis medications.  She currently takes 1200 mg of calcium and 2000 international units of D3 per day.  Fracture history over the age of 50 includes: None. Family history of osteoporosis includes: Mother, who was kyphotic.  She reports menopause around the age of 56.  She does not smoke or drink alcohol.  Physical activity includes: Home exercises and stationary bike 2 times per week.  She denies any history of steroid use, cancer, radiation treatment, chronic diarrhea, heart attack, or stroke.  She does have a history of kidney stones, approximately 8 or 9 years ago.  She is on long-term PPIs for GERD.  She also takes Norco and Prozac.  She does see a dentist regularly.  She has fallen in the last year, does at times feel unsteady with walking, and is worried about falling.  She scored 12 out of 14 on the STEADI risk for falling assessment.  Chart review completed today, along with recent and  previous labs, as well as previous imaging.  Most recent creatinine clearance is 70.34.  She did have a DEXA scan on 2022 at [facility], which revealed a T score of -1.1 in the right femoral neck, with FRAX scores of 9.4% and 1.4%.  Referred for consultation by Dr. Busch.    ELAINA Fall Risk Assessment was completed, and patient is at HIGH risk for falls. Assessment completed on:2022       Past Medical History:   Diagnosis Date   • Allergic    • Anemia    • Arthritis    • Cholelithiasis    • Clotting disorder (HCC)    • Colon polyp    • Diabetes mellitus (HCC)    • GERD (gastroesophageal reflux disease)    • Hyperlipidemia many years ago    treated with meds   • Hypertension    • Kidney stone    • Low back pain many  years   • Visual impairment        Past Surgical History:   Procedure Laterality Date   • CHOLECYSTECTOMY     • COLONOSCOPY     • TONSILLECTOMY         Family History   Problem Relation Age of Onset   • Osteoporosis Mother    • Arthritis Mother    • Heart disease Mother    • Arthritis Sister    • Cancer Sister    • Hyperlipidemia Sister    • Diabetes Sister    • Arthritis Sister    • Cancer Brother    • Arthritis Brother    • Heart disease Brother    • Hyperlipidemia Brother    • Diabetes Brother    • Anxiety disorder Brother    • Heart disease Brother        Social History     Occupational History   • Not on file   Tobacco Use   • Smoking status: Former Smoker     Packs/day: 1.50     Years: 15.00     Pack years: 22.50     Types: Cigarettes     Start date: 1967     Quit date: 10/31/1981     Years since quittin.5   • Smokeless tobacco: Never Used   • Tobacco comment: Quit smoking 41 years ago   Vaping Use   • Vaping Use: Never used   Substance and Sexual Activity   • Alcohol use: Never   • Drug use: No   • Sexual activity: Yes     Partners: Male     Birth control/protection: None        Medications:    Current Outpatient Medications:   •  amLODIPine (NORVASC) 10 MG tablet,  TAKE 1 TABLET DAILY, Disp: 90 tablet, Rfl: 3  •  atorvastatin (Lipitor) 10 MG tablet, Take 1 tablet by mouth Daily., Disp: 90 tablet, Rfl: 1  •  calcium carbonate-vitamin d 600-400 MG-UNIT per tablet, Take 1 tablet by mouth 2 (Two) Times a Day for 30 days. With meals, Disp: 60 tablet, Rfl: 11  •  celecoxib (CeleBREX) 200 MG capsule, Take 200 mg by mouth Daily., Disp: , Rfl:   •  cloNIDine (Catapres) 0.1 MG tablet, Take 1 tablet by mouth 2 (Two) Times a Day for 90 days., Disp: 180 tablet, Rfl: 2  •  CRANBERRY EXTRACT PO, Take  by mouth., Disp: , Rfl:   •  D-Mannose 500 MG capsule, Take 2 capsules by mouth Daily., Disp: , Rfl:   •  ferrous gluconate (FERGON) 240 (27 FE) MG tablet, Take  by mouth Daily With Breakfast., Disp: , Rfl:   •  FLUoxetine (PROzac) 20 MG capsule, TAKE 1 CAPSULE DAILY, Disp: 90 capsule, Rfl: 3  •  HYDROcodone-acetaminophen (NORCO)  MG per tablet, Take 1 tbalet every 4-6 hours MAX 4 tablets per day, Disp: 360 tablet, Rfl: 0  •  metFORMIN (GLUCOPHAGE) 500 MG tablet, TAKE 2 TABLETS TWICE A DAY, Disp: 360 tablet, Rfl: 3  •  Multiple Vitamin (ONE-A-DAY ESSENTIAL) tablet, Take  by mouth., Disp: , Rfl:   •  pantoprazole (PROTONIX) 40 MG EC tablet, Take 1 tablet by mouth Daily., Disp: 90 tablet, Rfl: 3  •  quinapril (ACCUPRIL) 40 MG tablet, TAKE 1 TABLET TWICE A DAY, Disp: 180 tablet, Rfl: 3  •  Specialty Vitamins Products (Inulose Blood Sugar Support) capsule, Take  by mouth. Natures nutrition Blood sugar support, Disp: , Rfl:   •  folic acid (FOLVITE) 400 MCG tablet, Take 400 mcg by mouth Daily., Disp: , Rfl:   •  glimepiride (AMARYL) 1 MG tablet, Take 1 tablet by mouth Every Morning Before Breakfast. (Patient taking differently: Take 1 mg by mouth Every Morning Before Breakfast. Takes half tablet qhs), Disp: 90 tablet, Rfl: 1  •  phentermine (ADIPEX-P) 37.5 MG tablet, TAKE 1 TABLET BY MOUTH DAILY, Disp: 30 tablet, Rfl: 2    Allergies:  Allergies   Allergen Reactions   • Sulfa Antibiotics  "Urinary Retention and Unknown - High Severity   • Citrus Swelling   • Nickel Rash         Review of Systems:  Gen -no fever, chills , sweats, headache   Eyes - no irritation or discharge   ENT -  no ear pain , runny nose , sore throat , difficulty swallowing   Resp - no cough , congestion , excessive expectoration   CVS - no chest pain , palpitations.   Abd - no pain , nausea , vomiting , diarrhea   Skin - no rash , lesions.   Neuro - no dizziness    Please see HPI for any other pertinent positives.  All other systems were reviewed and are negative.       Objective   Objective:    /71 (BP Location: Left arm, Patient Position: Sitting, Cuff Size: Large Adult)   Pulse 59   Ht 170.2 cm (67\")   Wt 97.8 kg (215 lb 9.6 oz)   BMI 33.77 kg/m²     Physical Examination:  Obese individual in no acute distress, patient is alert and cooperative with the exam, appears to have normal mood and affect with a normal attention span and concentration, ambulating with the assistance of a cane  normocephalic, atraumatic, extraocular movements intact, conjunctiva and sclera are clear, grossly normal hearing  no lymphadenopathy or thyromegaly  normal respiratory effort  abdomen is nontender, without guarding or rebound and nondistended  Very slightly kyphotic  no LE edema noted  cranial nerves II-XII grossly intact with no focal defects  skin intact without lesions or rashes visible         Imagin[facility]22-[facility] at [facility], revealed a T score of -1.1 in the right femoral neck, 0.3 in the right total hip, and 2.3 in the spine, with FRAX scores of 9.4% and 1.4%.            Assessment:  1. Disorder of bone density and structure, unspecified    2. Vitamin D deficiency    3. Family hx osteoporosis    4. Type 2 diabetes mellitus without complication, without long-term current use of insulin ([facility])    5. Asthmatic bronchitis without complication, unspecified asthma severity, unspecified whether persistent    6. " "Depressive disorder    7. Idiopathic thrombocytopenic purpura (HCC)                 Plan:  She should continue her calcium and vitamin D.  Her T score in the spine is likely inaccurate due to to arthritic changes.  Although the patient's T score is only -1.1, she has multiple risk factors, including diabetes mellitus type 2, vitamin D deficiency, depression, family history of osteoporosis, asthma, and is a high fall risk.  I have recommended short-term treatment with either an oral or IV bisphosphonate.  We did discuss the risks and benefits of these medications today, including but not limited to, bone pain, muscle aches, flulike reaction with IV, hypocalcemia, osteonecrosis of the jaw, and atypical femur fractures.  She has deferred treatment at this time.  She will be given a bone health folder today containing everything discussed, including weightbearing exercises and fall prevention.  She has deferred formal physical therapy at this time.  I will plan to see her back in 1 year and as needed, and she should call with any questions or concerns.  PATIENT EDUCATION:    Education was provided to: patient  Patient response: expressed understanding and denies need  Topics discussed: medical condition, workup results, treatment options, therapeutic risks and benefits, medication use, health promotion, diet and nutrition, support systems available, drug interactions, compliance with medication, osteoporosis risks, oral versus IV bisphosphonate  Informed how: verbally, demonstration, literature               DAVID Padilla  05/13/22  11:59 EDT    \"Please note that portions of this note were completed with a voice recognition program\".   "

## 2022-05-13 NOTE — TELEPHONE ENCOUNTER
Caller: Maylin Garcia    Relationship to patient: Self    Best call back number: [phone]    Patient is needing: PATIENT IS WANTING TO SEE IF DR. PUENTE RECOMMENDS THEY GET THE FOURTH COVID-19 SHOT, THE 2ND BOOSTER     SHE IS ASKING FOR HER AND HER

## 2022-05-13 NOTE — TELEPHONE ENCOUNTER
Call back to patient, advised of diagnosis. Patient states she was given osteoporosis folder. Advised yes as we did not have any other folders however we want her to have the information with exercises and what Calcium and Vit D are appropriate for her to take as well. Okay per patient; thanked us for calling.

## 2022-05-31 ENCOUNTER — TELEPHONE (OUTPATIENT)
Dept: FAMILY MEDICINE CLINIC | Facility: CLINIC | Age: 77
End: 2022-05-31

## 2022-06-03 DIAGNOSIS — N18.31 STAGE 3A CHRONIC KIDNEY DISEASE: Primary | ICD-10-CM

## 2022-06-03 DIAGNOSIS — D47.2 MGUS (MONOCLONAL GAMMOPATHY OF UNKNOWN SIGNIFICANCE): ICD-10-CM

## 2022-06-03 DIAGNOSIS — R41.0 INTERMITTENT CONFUSION: ICD-10-CM

## 2022-06-03 DIAGNOSIS — N30.01 ACUTE CYSTITIS WITH HEMATURIA: ICD-10-CM

## 2022-06-04 ENCOUNTER — LAB (OUTPATIENT)
Dept: LAB | Facility: HOSPITAL | Age: 77
End: 2022-06-04

## 2022-06-04 DIAGNOSIS — D47.2 MGUS (MONOCLONAL GAMMOPATHY OF UNKNOWN SIGNIFICANCE): ICD-10-CM

## 2022-06-04 DIAGNOSIS — N30.01 ACUTE CYSTITIS WITH HEMATURIA: ICD-10-CM

## 2022-06-04 DIAGNOSIS — N18.31 STAGE 3A CHRONIC KIDNEY DISEASE: ICD-10-CM

## 2022-06-04 DIAGNOSIS — R41.0 INTERMITTENT CONFUSION: ICD-10-CM

## 2022-06-04 DIAGNOSIS — E11.9 TYPE 2 DIABETES MELLITUS WITHOUT COMPLICATION, WITHOUT LONG-TERM CURRENT USE OF INSULIN: ICD-10-CM

## 2022-06-04 LAB
ANION GAP SERPL CALCULATED.3IONS-SCNC: 10.8 MMOL/L (ref 5–15)
BACTERIA UR QL AUTO: ABNORMAL /HPF
BASOPHILS # BLD AUTO: 0.03 10*3/MM3 (ref 0–0.2)
BASOPHILS NFR BLD AUTO: 0.6 % (ref 0–1.5)
BILIRUB UR QL STRIP: NEGATIVE
BUN SERPL-MCNC: 22 MG/DL (ref 8–23)
BUN/CREAT SERPL: 21.8 (ref 7–25)
CALCIUM SPEC-SCNC: 10 MG/DL (ref 8.6–10.5)
CHLORIDE SERPL-SCNC: 100 MMOL/L (ref 98–107)
CLARITY UR: CLEAR
CO2 SERPL-SCNC: 29.2 MMOL/L (ref 22–29)
COLOR UR: YELLOW
CREAT SERPL-MCNC: 1.01 MG/DL (ref 0.57–1)
DEPRECATED RDW RBC AUTO: 47.1 FL (ref 37–54)
EGFRCR SERPLBLD CKD-EPI 2021: 58.5 ML/MIN/1.73
EOSINOPHIL # BLD AUTO: 0.14 10*3/MM3 (ref 0–0.4)
EOSINOPHIL NFR BLD AUTO: 2.8 % (ref 0.3–6.2)
ERYTHROCYTE [DISTWIDTH] IN BLOOD BY AUTOMATED COUNT: 13.2 % (ref 12.3–15.4)
GLUCOSE SERPL-MCNC: 98 MG/DL (ref 65–99)
GLUCOSE UR STRIP-MCNC: NEGATIVE MG/DL
HCT VFR BLD AUTO: 42 % (ref 34–46.6)
HGB BLD-MCNC: 13.4 G/DL (ref 12–15.9)
HGB UR QL STRIP.AUTO: NEGATIVE
HYALINE CASTS UR QL AUTO: ABNORMAL /LPF
IMM GRANULOCYTES # BLD AUTO: 0.02 10*3/MM3 (ref 0–0.05)
IMM GRANULOCYTES NFR BLD AUTO: 0.4 % (ref 0–0.5)
KETONES UR QL STRIP: NEGATIVE
LEUKOCYTE ESTERASE UR QL STRIP.AUTO: ABNORMAL
LYMPHOCYTES # BLD AUTO: 1.31 10*3/MM3 (ref 0.7–3.1)
LYMPHOCYTES NFR BLD AUTO: 26 % (ref 19.6–45.3)
MCH RBC QN AUTO: 31.2 PG (ref 26.6–33)
MCHC RBC AUTO-ENTMCNC: 31.9 G/DL (ref 31.5–35.7)
MCV RBC AUTO: 97.7 FL (ref 79–97)
MONOCYTES # BLD AUTO: 0.55 10*3/MM3 (ref 0.1–0.9)
MONOCYTES NFR BLD AUTO: 10.9 % (ref 5–12)
NEUTROPHILS NFR BLD AUTO: 2.99 10*3/MM3 (ref 1.7–7)
NEUTROPHILS NFR BLD AUTO: 59.3 % (ref 42.7–76)
NITRITE UR QL STRIP: NEGATIVE
NRBC BLD AUTO-RTO: 0 /100 WBC (ref 0–0.2)
PH UR STRIP.AUTO: 6.5 [PH] (ref 5–8)
PLATELET # BLD AUTO: 203 10*3/MM3 (ref 140–450)
PMV BLD AUTO: 11 FL (ref 6–12)
POTASSIUM SERPL-SCNC: 5.7 MMOL/L (ref 3.5–5.2)
PROT UR QL STRIP: NEGATIVE
RBC # BLD AUTO: 4.3 10*6/MM3 (ref 3.77–5.28)
RBC # UR STRIP: ABNORMAL /HPF
REF LAB TEST METHOD: ABNORMAL
SODIUM SERPL-SCNC: 140 MMOL/L (ref 136–145)
SP GR UR STRIP: 1.01 (ref 1–1.03)
SQUAMOUS #/AREA URNS HPF: ABNORMAL /HPF
UROBILINOGEN UR QL STRIP: ABNORMAL
WBC # UR STRIP: ABNORMAL /HPF
WBC NRBC COR # BLD: 5.04 10*3/MM3 (ref 3.4–10.8)

## 2022-06-04 PROCEDURE — 87077 CULTURE AEROBIC IDENTIFY: CPT

## 2022-06-04 PROCEDURE — 85025 COMPLETE CBC W/AUTO DIFF WBC: CPT

## 2022-06-04 PROCEDURE — 80048 BASIC METABOLIC PNL TOTAL CA: CPT

## 2022-06-04 PROCEDURE — 84165 PROTEIN E-PHORESIS SERUM: CPT

## 2022-06-04 PROCEDURE — 36415 COLL VENOUS BLD VENIPUNCTURE: CPT

## 2022-06-04 PROCEDURE — 87186 SC STD MICRODIL/AGAR DIL: CPT

## 2022-06-04 PROCEDURE — 81001 URINALYSIS AUTO W/SCOPE: CPT

## 2022-06-04 PROCEDURE — 84155 ASSAY OF PROTEIN SERUM: CPT

## 2022-06-04 PROCEDURE — 87086 URINE CULTURE/COLONY COUNT: CPT

## 2022-06-04 RX ORDER — AMPICILLIN 500 MG/1
500 CAPSULE ORAL 4 TIMES DAILY
Qty: 20 CAPSULE | Refills: 0 | OUTPATIENT
Start: 2022-06-04 | End: 2022-06-06

## 2022-06-05 ENCOUNTER — HOSPITAL ENCOUNTER (OUTPATIENT)
Facility: HOSPITAL | Age: 77
Setting detail: OBSERVATION
Discharge: HOME | End: 2022-06-06
Attending: INTERNAL MEDICINE | Admitting: INTERNAL MEDICINE

## 2022-06-05 ENCOUNTER — APPOINTMENT (OUTPATIENT)
Dept: CT IMAGING | Facility: HOSPITAL | Age: 77
End: 2022-06-05

## 2022-06-05 DIAGNOSIS — N39.0 URINARY TRACT INFECTION WITHOUT HEMATURIA, SITE UNSPECIFIED: ICD-10-CM

## 2022-06-05 DIAGNOSIS — R41.82 ALTERED MENTAL STATUS, UNSPECIFIED ALTERED MENTAL STATUS TYPE: Primary | ICD-10-CM

## 2022-06-05 LAB
ALBUMIN SERPL-MCNC: 4.8 G/DL (ref 3.5–5.2)
ALBUMIN/GLOB SERPL: 2.1 G/DL
ALP SERPL-CCNC: 67 U/L (ref 39–117)
ALT SERPL W P-5'-P-CCNC: 44 U/L (ref 1–33)
ANION GAP SERPL CALCULATED.3IONS-SCNC: 11 MMOL/L (ref 5–15)
AST SERPL-CCNC: 25 U/L (ref 1–32)
B PARAPERT DNA SPEC QL NAA+PROBE: NOT DETECTED
B PERT DNA SPEC QL NAA+PROBE: NOT DETECTED
BACTERIA UR QL AUTO: ABNORMAL /HPF
BASOPHILS # BLD AUTO: 0 10*3/MM3 (ref 0–0.2)
BASOPHILS NFR BLD AUTO: 0.7 % (ref 0–1.5)
BILIRUB SERPL-MCNC: 0.4 MG/DL (ref 0–1.2)
BILIRUB UR QL STRIP: NEGATIVE
BUN SERPL-MCNC: 19 MG/DL (ref 8–23)
BUN/CREAT SERPL: 21.6 (ref 7–25)
C PNEUM DNA NPH QL NAA+NON-PROBE: NOT DETECTED
CALCIUM SPEC-SCNC: 9.7 MG/DL (ref 8.6–10.5)
CHLORIDE SERPL-SCNC: 102 MMOL/L (ref 98–107)
CLARITY UR: CLEAR
CO2 SERPL-SCNC: 27 MMOL/L (ref 22–29)
COLOR UR: YELLOW
CREAT SERPL-MCNC: 0.88 MG/DL (ref 0.57–1)
DEPRECATED RDW RBC AUTO: 45.5 FL (ref 37–54)
EGFRCR SERPLBLD CKD-EPI 2021: 69.1 ML/MIN/1.73
EOSINOPHIL # BLD AUTO: 0.1 10*3/MM3 (ref 0–0.4)
EOSINOPHIL NFR BLD AUTO: 1.8 % (ref 0.3–6.2)
ERYTHROCYTE [DISTWIDTH] IN BLOOD BY AUTOMATED COUNT: 13.8 % (ref 12.3–15.4)
FLUAV SUBTYP SPEC NAA+PROBE: NOT DETECTED
FLUBV RNA ISLT QL NAA+PROBE: NOT DETECTED
GLOBULIN UR ELPH-MCNC: 2.3 GM/DL
GLUCOSE SERPL-MCNC: 101 MG/DL (ref 65–99)
GLUCOSE UR STRIP-MCNC: NEGATIVE MG/DL
HADV DNA SPEC NAA+PROBE: NOT DETECTED
HCOV 229E RNA SPEC QL NAA+PROBE: NOT DETECTED
HCOV HKU1 RNA SPEC QL NAA+PROBE: NOT DETECTED
HCOV NL63 RNA SPEC QL NAA+PROBE: NOT DETECTED
HCOV OC43 RNA SPEC QL NAA+PROBE: NOT DETECTED
HCT VFR BLD AUTO: 40.4 % (ref 34–46.6)
HGB BLD-MCNC: 13.4 G/DL (ref 12–15.9)
HGB UR QL STRIP.AUTO: NEGATIVE
HMPV RNA NPH QL NAA+NON-PROBE: NOT DETECTED
HPIV1 RNA ISLT QL NAA+PROBE: NOT DETECTED
HPIV2 RNA SPEC QL NAA+PROBE: NOT DETECTED
HPIV3 RNA NPH QL NAA+PROBE: NOT DETECTED
HPIV4 P GENE NPH QL NAA+PROBE: NOT DETECTED
HYALINE CASTS UR QL AUTO: ABNORMAL /LPF
KETONES UR QL STRIP: NEGATIVE
LEUKOCYTE ESTERASE UR QL STRIP.AUTO: ABNORMAL
LYMPHOCYTES # BLD AUTO: 1.4 10*3/MM3 (ref 0.7–3.1)
LYMPHOCYTES NFR BLD AUTO: 28.4 % (ref 19.6–45.3)
M PNEUMO IGG SER IA-ACNC: NOT DETECTED
MCH RBC QN AUTO: 31.3 PG (ref 26.6–33)
MCHC RBC AUTO-ENTMCNC: 33.2 G/DL (ref 31.5–35.7)
MCV RBC AUTO: 94.3 FL (ref 79–97)
MONOCYTES # BLD AUTO: 0.5 10*3/MM3 (ref 0.1–0.9)
MONOCYTES NFR BLD AUTO: 9.2 % (ref 5–12)
NEUTROPHILS NFR BLD AUTO: 3 10*3/MM3 (ref 1.7–7)
NEUTROPHILS NFR BLD AUTO: 59.9 % (ref 42.7–76)
NITRITE UR QL STRIP: NEGATIVE
NRBC BLD AUTO-RTO: 0.1 /100 WBC (ref 0–0.2)
PH UR STRIP.AUTO: 6.5 [PH] (ref 5–8)
PLATELET # BLD AUTO: 184 10*3/MM3 (ref 140–450)
PMV BLD AUTO: 8.3 FL (ref 6–12)
POTASSIUM SERPL-SCNC: 4.6 MMOL/L (ref 3.5–5.2)
PROT SERPL-MCNC: 7.1 G/DL (ref 6–8.5)
PROT UR QL STRIP: NEGATIVE
RBC # BLD AUTO: 4.28 10*6/MM3 (ref 3.77–5.28)
RBC # UR STRIP: ABNORMAL /HPF
REF LAB TEST METHOD: ABNORMAL
RHINOVIRUS RNA SPEC NAA+PROBE: NOT DETECTED
RSV RNA NPH QL NAA+NON-PROBE: NOT DETECTED
SARS-COV-2 RNA NPH QL NAA+NON-PROBE: NOT DETECTED
SODIUM SERPL-SCNC: 140 MMOL/L (ref 136–145)
SP GR UR STRIP: 1.02 (ref 1–1.03)
SQUAMOUS #/AREA URNS HPF: ABNORMAL /HPF
UROBILINOGEN UR QL STRIP: ABNORMAL
WBC # UR STRIP: ABNORMAL /HPF
WBC NRBC COR # BLD: 5 10*3/MM3 (ref 3.4–10.8)

## 2022-06-05 PROCEDURE — 99284 EMERGENCY DEPT VISIT MOD MDM: CPT

## 2022-06-05 PROCEDURE — 85025 COMPLETE CBC W/AUTO DIFF WBC: CPT

## 2022-06-05 PROCEDURE — 96374 THER/PROPH/DIAG INJ IV PUSH: CPT

## 2022-06-05 PROCEDURE — 25010000002 CEFTRIAXONE PER 250 MG

## 2022-06-05 PROCEDURE — 81001 URINALYSIS AUTO W/SCOPE: CPT

## 2022-06-05 PROCEDURE — 0202U NFCT DS 22 TRGT SARS-COV-2: CPT

## 2022-06-05 PROCEDURE — 70450 CT HEAD/BRAIN W/O DYE: CPT

## 2022-06-05 PROCEDURE — 80053 COMPREHEN METABOLIC PANEL: CPT

## 2022-06-05 RX ORDER — NITROFURANTOIN 25; 75 MG/1; MG/1
100 CAPSULE ORAL 2 TIMES DAILY
Qty: 14 CAPSULE | Refills: 0 | OUTPATIENT
Start: 2022-06-05 | End: 2022-06-06

## 2022-06-05 RX ORDER — SODIUM CHLORIDE 0.9 % (FLUSH) 0.9 %
10 SYRINGE (ML) INJECTION AS NEEDED
Status: DISCONTINUED | OUTPATIENT
Start: 2022-06-05 | End: 2022-06-06 | Stop reason: HOSPADM

## 2022-06-05 RX ADMIN — SODIUM CHLORIDE 500 ML: 9 INJECTION, SOLUTION INTRAVENOUS at 21:39

## 2022-06-05 RX ADMIN — WATER 1 G: 100 INJECTION, SOLUTION INTRAVENOUS at 23:51

## 2022-06-05 NOTE — PROGRESS NOTES
Stop Celebrex because you have borderline renal function.  No evidence of UTI.  If episodes of confusion continue will need to look at medications as the possible source

## 2022-06-06 ENCOUNTER — READMISSION MANAGEMENT (OUTPATIENT)
Dept: CALL CENTER | Facility: HOSPITAL | Age: 77
End: 2022-06-06

## 2022-06-06 ENCOUNTER — TELEPHONE (OUTPATIENT)
Dept: FAMILY MEDICINE CLINIC | Facility: CLINIC | Age: 77
End: 2022-06-06

## 2022-06-06 VITALS
TEMPERATURE: 98 F | BODY MASS INDEX: 33.84 KG/M2 | OXYGEN SATURATION: 98 % | HEIGHT: 66 IN | WEIGHT: 210.54 LBS | SYSTOLIC BLOOD PRESSURE: 142 MMHG | DIASTOLIC BLOOD PRESSURE: 71 MMHG | HEART RATE: 81 BPM | RESPIRATION RATE: 16 BRPM

## 2022-06-06 DIAGNOSIS — N30.01 ACUTE CYSTITIS WITH HEMATURIA: Primary | ICD-10-CM

## 2022-06-06 PROBLEM — N39.0 ACUTE UTI: Status: ACTIVE | Noted: 2022-06-06

## 2022-06-06 PROBLEM — N39.0 ACUTE UTI (URINARY TRACT INFECTION): Status: ACTIVE | Noted: 2022-06-06

## 2022-06-06 PROBLEM — R41.82 ALTERED MENTAL STATUS, UNSPECIFIED ALTERED MENTAL STATUS TYPE: Status: ACTIVE | Noted: 2022-06-06

## 2022-06-06 LAB
ALBUMIN SERPL ELPH-MCNC: 4.2 G/DL (ref 2.9–4.4)
ALBUMIN/GLOB SERPL: 1.3 {RATIO} (ref 0.7–1.7)
ALPHA1 GLOB SERPL ELPH-MCNC: 0.2 G/DL (ref 0–0.4)
ALPHA2 GLOB SERPL ELPH-MCNC: 0.8 G/DL (ref 0.4–1)
ANION GAP SERPL CALCULATED.3IONS-SCNC: 9 MMOL/L (ref 5–15)
B-GLOBULIN SERPL ELPH-MCNC: 1.1 G/DL (ref 0.7–1.3)
BACTERIA SPEC AEROBE CULT: ABNORMAL
BASOPHILS # BLD AUTO: 0 10*3/MM3 (ref 0–0.2)
BASOPHILS NFR BLD AUTO: 0.7 % (ref 0–1.5)
BUN SERPL-MCNC: 18 MG/DL (ref 8–23)
BUN/CREAT SERPL: 18.6 (ref 7–25)
CALCIUM SPEC-SCNC: 9.5 MG/DL (ref 8.6–10.5)
CHLORIDE SERPL-SCNC: 106 MMOL/L (ref 98–107)
CO2 SERPL-SCNC: 27 MMOL/L (ref 22–29)
CREAT SERPL-MCNC: 0.97 MG/DL (ref 0.57–1)
DEPRECATED RDW RBC AUTO: 46.8 FL (ref 37–54)
EGFRCR SERPLBLD CKD-EPI 2021: 61.4 ML/MIN/1.73
EOSINOPHIL # BLD AUTO: 0.1 10*3/MM3 (ref 0–0.4)
EOSINOPHIL NFR BLD AUTO: 2 % (ref 0.3–6.2)
ERYTHROCYTE [DISTWIDTH] IN BLOOD BY AUTOMATED COUNT: 14 % (ref 12.3–15.4)
GAMMA GLOB SERPL ELPH-MCNC: 1.1 G/DL (ref 0.4–1.8)
GLOBULIN SER CALC-MCNC: 3.2 G/DL (ref 2.2–3.9)
GLUCOSE BLDC GLUCOMTR-MCNC: 126 MG/DL (ref 70–105)
GLUCOSE SERPL-MCNC: 125 MG/DL (ref 65–99)
HBA1C MFR BLD: 5.9 % (ref 3.5–5.6)
HCT VFR BLD AUTO: 41.3 % (ref 34–46.6)
HGB BLD-MCNC: 13.5 G/DL (ref 12–15.9)
LABORATORY COMMENT REPORT: ABNORMAL
LYMPHOCYTES # BLD AUTO: 1.3 10*3/MM3 (ref 0.7–3.1)
LYMPHOCYTES NFR BLD AUTO: 22.9 % (ref 19.6–45.3)
M PROTEIN SERPL ELPH-MCNC: 0.6 G/DL
MCH RBC QN AUTO: 31.2 PG (ref 26.6–33)
MCHC RBC AUTO-ENTMCNC: 32.7 G/DL (ref 31.5–35.7)
MCV RBC AUTO: 95.5 FL (ref 79–97)
MONOCYTES # BLD AUTO: 0.6 10*3/MM3 (ref 0.1–0.9)
MONOCYTES NFR BLD AUTO: 10.1 % (ref 5–12)
NEUTROPHILS NFR BLD AUTO: 3.6 10*3/MM3 (ref 1.7–7)
NEUTROPHILS NFR BLD AUTO: 64.3 % (ref 42.7–76)
NRBC BLD AUTO-RTO: 0 /100 WBC (ref 0–0.2)
PLATELET # BLD AUTO: 185 10*3/MM3 (ref 140–450)
PMV BLD AUTO: 8.8 FL (ref 6–12)
POTASSIUM SERPL-SCNC: 5.1 MMOL/L (ref 3.5–5.2)
PROT SERPL-MCNC: 7.4 G/DL (ref 6–8.5)
RBC # BLD AUTO: 4.32 10*6/MM3 (ref 3.77–5.28)
SODIUM SERPL-SCNC: 142 MMOL/L (ref 136–145)
WBC NRBC COR # BLD: 5.6 10*3/MM3 (ref 3.4–10.8)

## 2022-06-06 PROCEDURE — 85025 COMPLETE CBC W/AUTO DIFF WBC: CPT | Performed by: NURSE PRACTITIONER

## 2022-06-06 PROCEDURE — 96361 HYDRATE IV INFUSION ADD-ON: CPT

## 2022-06-06 PROCEDURE — 80048 BASIC METABOLIC PNL TOTAL CA: CPT | Performed by: NURSE PRACTITIONER

## 2022-06-06 PROCEDURE — G0378 HOSPITAL OBSERVATION PER HR: HCPCS

## 2022-06-06 PROCEDURE — 82962 GLUCOSE BLOOD TEST: CPT

## 2022-06-06 PROCEDURE — 83036 HEMOGLOBIN GLYCOSYLATED A1C: CPT | Performed by: NURSE PRACTITIONER

## 2022-06-06 PROCEDURE — 36415 COLL VENOUS BLD VENIPUNCTURE: CPT | Performed by: NURSE PRACTITIONER

## 2022-06-06 PROCEDURE — 99235 HOSP IP/OBS SAME DATE MOD 70: CPT | Performed by: INTERNAL MEDICINE

## 2022-06-06 RX ORDER — ATORVASTATIN CALCIUM 10 MG/1
10 TABLET, FILM COATED ORAL DAILY
Status: DISCONTINUED | OUTPATIENT
Start: 2022-06-06 | End: 2022-06-06 | Stop reason: HOSPADM

## 2022-06-06 RX ORDER — INSULIN LISPRO 100 [IU]/ML
0-7 INJECTION, SOLUTION INTRAVENOUS; SUBCUTANEOUS AS NEEDED
Status: DISCONTINUED | OUTPATIENT
Start: 2022-06-06 | End: 2022-06-06 | Stop reason: HOSPADM

## 2022-06-06 RX ORDER — ACETAMINOPHEN 325 MG/1
650 TABLET ORAL EVERY 4 HOURS PRN
Status: DISCONTINUED | OUTPATIENT
Start: 2022-06-06 | End: 2022-06-06 | Stop reason: HOSPADM

## 2022-06-06 RX ORDER — HYDROCODONE BITARTRATE AND ACETAMINOPHEN 10; 325 MG/1; MG/1
1 TABLET ORAL EVERY 6 HOURS PRN
Status: DISCONTINUED | OUTPATIENT
Start: 2022-06-06 | End: 2022-06-06 | Stop reason: HOSPADM

## 2022-06-06 RX ORDER — LISINOPRIL 20 MG/1
20 TABLET ORAL EVERY 12 HOURS SCHEDULED
Status: DISCONTINUED | OUTPATIENT
Start: 2022-06-06 | End: 2022-06-06 | Stop reason: HOSPADM

## 2022-06-06 RX ORDER — LANOLIN ALCOHOL/MO/W.PET/CERES
400 CREAM (GRAM) TOPICAL DAILY
Status: DISCONTINUED | OUTPATIENT
Start: 2022-06-06 | End: 2022-06-06 | Stop reason: HOSPADM

## 2022-06-06 RX ORDER — AMLODIPINE BESYLATE 5 MG/1
10 TABLET ORAL DAILY
Status: DISCONTINUED | OUTPATIENT
Start: 2022-06-06 | End: 2022-06-06 | Stop reason: HOSPADM

## 2022-06-06 RX ORDER — SODIUM CHLORIDE 9 MG/ML
75 INJECTION, SOLUTION INTRAVENOUS CONTINUOUS
Status: DISCONTINUED | OUTPATIENT
Start: 2022-06-06 | End: 2022-06-06 | Stop reason: HOSPADM

## 2022-06-06 RX ORDER — SODIUM CHLORIDE 0.9 % (FLUSH) 0.9 %
3-10 SYRINGE (ML) INJECTION AS NEEDED
Status: DISCONTINUED | OUTPATIENT
Start: 2022-06-06 | End: 2022-06-06 | Stop reason: HOSPADM

## 2022-06-06 RX ORDER — ONDANSETRON 2 MG/ML
4 INJECTION INTRAMUSCULAR; INTRAVENOUS EVERY 6 HOURS PRN
Status: DISCONTINUED | OUTPATIENT
Start: 2022-06-06 | End: 2022-06-06 | Stop reason: HOSPADM

## 2022-06-06 RX ORDER — DIPHENOXYLATE HYDROCHLORIDE AND ATROPINE SULFATE 2.5; .025 MG/1; MG/1
1 TABLET ORAL DAILY
Status: DISCONTINUED | OUTPATIENT
Start: 2022-06-06 | End: 2022-06-06 | Stop reason: HOSPADM

## 2022-06-06 RX ORDER — NITROGLYCERIN 0.4 MG/1
0.4 TABLET SUBLINGUAL
Status: DISCONTINUED | OUTPATIENT
Start: 2022-06-06 | End: 2022-06-06 | Stop reason: HOSPADM

## 2022-06-06 RX ORDER — INSULIN LISPRO 100 [IU]/ML
0-7 INJECTION, SOLUTION INTRAVENOUS; SUBCUTANEOUS
Status: DISCONTINUED | OUTPATIENT
Start: 2022-06-06 | End: 2022-06-06 | Stop reason: HOSPADM

## 2022-06-06 RX ORDER — ONDANSETRON 4 MG/1
4 TABLET, FILM COATED ORAL EVERY 6 HOURS PRN
Status: DISCONTINUED | OUTPATIENT
Start: 2022-06-06 | End: 2022-06-06 | Stop reason: HOSPADM

## 2022-06-06 RX ORDER — CLONIDINE HYDROCHLORIDE 0.1 MG/1
0.1 TABLET ORAL 2 TIMES DAILY
Status: DISCONTINUED | OUTPATIENT
Start: 2022-06-06 | End: 2022-06-06

## 2022-06-06 RX ORDER — CLONIDINE HYDROCHLORIDE 0.1 MG/1
0.1 TABLET ORAL 2 TIMES DAILY
Status: DISCONTINUED | OUTPATIENT
Start: 2022-06-06 | End: 2022-06-06 | Stop reason: HOSPADM

## 2022-06-06 RX ORDER — ACETAMINOPHEN 160 MG/5ML
650 SOLUTION ORAL EVERY 4 HOURS PRN
Status: DISCONTINUED | OUTPATIENT
Start: 2022-06-06 | End: 2022-06-06 | Stop reason: HOSPADM

## 2022-06-06 RX ORDER — PANTOPRAZOLE SODIUM 40 MG/1
40 TABLET, DELAYED RELEASE ORAL
Status: DISCONTINUED | OUTPATIENT
Start: 2022-06-06 | End: 2022-06-06 | Stop reason: HOSPADM

## 2022-06-06 RX ORDER — LISINOPRIL 20 MG/1
20 TABLET ORAL EVERY 12 HOURS SCHEDULED
Refills: 3 | Status: DISCONTINUED | OUTPATIENT
Start: 2022-06-06 | End: 2022-06-06

## 2022-06-06 RX ORDER — NICOTINE POLACRILEX 4 MG
15 LOZENGE BUCCAL
Status: DISCONTINUED | OUTPATIENT
Start: 2022-06-06 | End: 2022-06-06 | Stop reason: HOSPADM

## 2022-06-06 RX ORDER — ACETAMINOPHEN 650 MG/1
650 SUPPOSITORY RECTAL EVERY 4 HOURS PRN
Status: DISCONTINUED | OUTPATIENT
Start: 2022-06-06 | End: 2022-06-06 | Stop reason: HOSPADM

## 2022-06-06 RX ORDER — CEFDINIR 300 MG/1
300 CAPSULE ORAL 2 TIMES DAILY
Qty: 8 CAPSULE | Refills: 0 | Status: SHIPPED | OUTPATIENT
Start: 2022-06-06 | End: 2022-06-10

## 2022-06-06 RX ORDER — DEXTROSE MONOHYDRATE 25 G/50ML
25 INJECTION, SOLUTION INTRAVENOUS
Status: DISCONTINUED | OUTPATIENT
Start: 2022-06-06 | End: 2022-06-06 | Stop reason: HOSPADM

## 2022-06-06 RX ORDER — FLUOXETINE HYDROCHLORIDE 20 MG/1
20 CAPSULE ORAL DAILY
Status: DISCONTINUED | OUTPATIENT
Start: 2022-06-06 | End: 2022-06-06 | Stop reason: HOSPADM

## 2022-06-06 RX ORDER — OLANZAPINE 10 MG/2ML
1 INJECTION, POWDER, LYOPHILIZED, FOR SOLUTION INTRAMUSCULAR AS NEEDED
Status: DISCONTINUED | OUTPATIENT
Start: 2022-06-06 | End: 2022-06-06 | Stop reason: HOSPADM

## 2022-06-06 RX ORDER — SODIUM CHLORIDE 0.9 % (FLUSH) 0.9 %
3 SYRINGE (ML) INJECTION EVERY 12 HOURS SCHEDULED
Status: DISCONTINUED | OUTPATIENT
Start: 2022-06-06 | End: 2022-06-06 | Stop reason: HOSPADM

## 2022-06-06 RX ADMIN — AMLODIPINE BESYLATE 10 MG: 5 TABLET ORAL at 08:13

## 2022-06-06 RX ADMIN — CLONIDINE HYDROCHLORIDE 0.1 MG: 0.1 TABLET ORAL at 03:33

## 2022-06-06 RX ADMIN — THERA TABS 1 TABLET: TAB at 08:13

## 2022-06-06 RX ADMIN — FLUOXETINE 20 MG: 20 CAPSULE ORAL at 09:14

## 2022-06-06 RX ADMIN — SODIUM CHLORIDE 75 ML/HR: 9 INJECTION, SOLUTION INTRAVENOUS at 01:05

## 2022-06-06 RX ADMIN — HYDROCODONE BITARTRATE AND ACETAMINOPHEN 1 TABLET: 10; 325 TABLET ORAL at 08:13

## 2022-06-06 RX ADMIN — Medication 3 ML: at 09:14

## 2022-06-06 RX ADMIN — CALCIUM CARBONATE-VITAMIN D TAB 500 MG-200 UNIT 1 TABLET: 500-200 TAB at 09:14

## 2022-06-06 RX ADMIN — FOLIC ACID TAB 400 MCG 400 MCG: 400 TAB at 08:14

## 2022-06-06 RX ADMIN — PANTOPRAZOLE SODIUM 40 MG: 40 TABLET, DELAYED RELEASE ORAL at 07:36

## 2022-06-06 RX ADMIN — ATORVASTATIN CALCIUM 10 MG: 10 TABLET, FILM COATED ORAL at 08:13

## 2022-06-06 RX ADMIN — ACETAMINOPHEN 650 MG: 325 TABLET ORAL at 03:47

## 2022-06-06 RX ADMIN — LISINOPRIL 20 MG: 20 TABLET ORAL at 03:33

## 2022-06-06 NOTE — PROGRESS NOTES
Maylin went to the emergency room last night.  Find out if they started her on treatment for the urinary tract infection that she has.  She has an allergy to sulfa and its not sensitive to ampicillin so if they did treat it find out what they used.  We may have to make changes.  It is also resistant to Macrobid.  Limited choices.

## 2022-06-06 NOTE — OUTREACH NOTE
Prep Survey    Flowsheet Row Responses   [facility] facility patient discharged from? [facility]   Is LACE score < 7 ? Yes   Emergency Room discharge w/ pulse ox? No   Eligibility TCM   Hospital [facility]   Date of Admission 06/05/22   Date of Discharge 06/06/22   Discharge Disposition Home or Self Care   Discharge diagnosis AMS, Acute UTI, T2DM   Does the patient have one of the following disease processes/diagnoses(primary or secondary)? Other   Does the patient have Home health ordered? No   Is there a DME ordered? No   Prep survey completed? Yes          SKINNY MISHRA - Registered Nurse

## 2022-06-06 NOTE — DISCHARGE SUMMARY
[facility] Medicine Services  DISCHARGE SUMMARY    Patient Name: Maylin Garcia  : 1945  MRN: [id number]    Date of Admission: 2022  Date of Discharge: 2022  Primary Care Physician: Abiodun Busch MD      Presenting Problem:   Altered mental status, unspecified altered mental status type [R41.82]  Acute UTI [N39.0]    Active and Resolved Hospital Problems:  Active Hospital Problems    Diagnosis POA   • **Acute UTI (urinary tract infection) [N39.0] Yes   • Altered mental status, unspecified altered mental status type [R41.82] Yes   • Depressive disorder [F32.A] Yes   • Hyperlipidemia [E78.5] Yes   • Hypertension [I10] Yes   • Type 2 diabetes mellitus (HCC) [E11.9] Yes   • Gastro-esophageal reflux disease with esophagitis [K21.00] Yes   • Low back pain [M54.50] Yes   • Overweight [E66.3] Yes   • Osteoarthritis [M19.90] Yes      Resolved Hospital Problems   No resolved problems to display.         Hospital Course     Hospital Course:  Maylin Garcia is a 76 y.o. female     Patient discharged same day as admit.  See H&P for further details.    DISCHARGE Follow Up Recommendations for labs and diagnostics:       Reasons For Change In Medications and Indications for New Medications:      Day of Discharge     Vital Signs:  Temp:  [97.8 °F (36.6 °C)] 97.8 °F (36.6 °C)  Heart Rate:  [55-81] 57  Resp:  [15-20] 16  BP: (136-158)/(63-83) 141/74    Physical Exam:          Pertinent  and/or Most Recent Results     LAB RESULTS:      Lab 22  1232   WBC 5.60 5.00 5.04   HEMOGLOBIN 13.5 13.4 13.4   HEMATOCRIT 41.3 40.4 42.0   PLATELETS 185 184 203   NEUTROS ABS 3.60 3.00 2.99   IMMATURE GRANS (ABS)  --   --  0.02   LYMPHS ABS 1.30 1.40 1.31   MONOS ABS 0.60 0.50 0.55   EOS ABS 0.10 0.10 0.14   MCV 95.5 94.3 97.7*         Lab 22/04/22  1232   SODIUM 142 140 140   POTASSIUM 5.1 4.6 5.7*   CHLORIDE 106 102 100   CO2  27.0 27.0 29.2*   ANION GAP 9.0 11.0 10.8   BUN 18 19 22   CREATININE 0.97 0.88 1.01*   EGFR 61.4 69.1 58.5*   GLUCOSE 125* 101* 98   CALCIUM 9.5 9.7 10.0   HEMOGLOBIN A1C 5.9*  --   --          Lab 06/05/22 2132   TOTAL PROTEIN 7.1   ALBUMIN 4.80   GLOBULIN 2.3   ALT (SGPT) 44*   AST (SGOT) 25   BILIRUBIN 0.4   ALK PHOS 67                     Brief Urine Lab Results  (Last result in the past 365 days)      Color   Clarity   Blood   Leuk Est   Nitrite   Protein   CREAT   Urine HCG        06/05/22 1847 Yellow   Clear   Negative   Small (1+)   Negative   Negative               Microbiology Results (last 10 days)     Procedure Component Value - Date/Time    Respiratory Panel PCR w/COVID-19(SARS-CoV-2) DESEAN/JESSE/NIKOLE/PAD/COR/MAD/REJI In-House, NP Swab in UTM/VTM, 3-4 HR TAT - Swab, Nasopharynx [[id number]]  (Normal) Collected: 06/05/22 2138    Lab Status: Final result Specimen: Swab from Nasopharynx Updated: 06/05/22 2232     ADENOVIRUS, PCR Not Detected     Coronavirus 229E Not Detected     Coronavirus HKU1 Not Detected     Coronavirus NL63 Not Detected     Coronavirus OC43 Not Detected     COVID19 Not Detected     Human Metapneumovirus Not Detected     Human Rhinovirus/Enterovirus Not Detected     Influenza A PCR Not Detected     Influenza B PCR Not Detected     Parainfluenza Virus 1 Not Detected     Parainfluenza Virus 2 Not Detected     Parainfluenza Virus 3 Not Detected     Parainfluenza Virus 4 Not Detected     RSV, PCR Not Detected     Bordetella pertussis pcr Not Detected     Bordetella parapertussis PCR Not Detected     Chlamydophila pneumoniae PCR Not Detected     Mycoplasma pneumo by PCR Not Detected    Narrative:      In the setting of a positive respiratory panel with a viral infection PLUS a negative procalcitonin without other underlying concern for bacterial infection, consider observing off antibiotics or discontinuation of antibiotics and continue supportive care. If the respiratory panel is positive for  atypical bacterial infection (Bordetella pertussis, Chlamydophila pneumoniae, or Mycoplasma pneumoniae), consider antibiotic de-escalation to target atypical bacterial infection.    Urine Culture - Urine, Urine, Clean Catch [[id number]]  (Abnormal)  (Susceptibility) Collected: 06/04/22 1232    Lab Status: Final result Specimen: Urine, Clean Catch Updated: 06/06/22 1116     Urine Culture >100,000 CFU/mL Klebsiella pneumoniae ssp pneumoniae    Narrative:      Colonization of the urinary tract without infection is common. Treatment is discouraged unless the patient is symptomatic, pregnant, or undergoing an invasive urologic procedure.    Susceptibility      Klebsiella pneumoniae ssp pneumoniae      IFTIKHAR      Ampicillin Resistant     Ampicillin + Sulbactam Susceptible      Cefazolin Susceptible      Cefepime Susceptible      Ceftazidime Susceptible      Ceftriaxone Susceptible      Gentamicin Susceptible      Levofloxacin Susceptible      Nitrofurantoin Resistant     Piperacillin + Tazobactam Susceptible      Trimethoprim + Sulfamethoxazole Susceptible                                 CT Head Without Contrast    Result Date: 6/5/2022  Impression:  No acute intracranial abnormality visible by CT.   This examination was interpreted by Tono Spencer M.D. Electronically signed by:  Tono Spencer M.D.  6/5/2022 9:46 PM                  Labs Pending at Discharge:      Procedures Performed           Consults:   Consults     No orders found for last 30 day(s).            Discharge Details        Discharge Medications      New Medications      Instructions Start Date   cefdinir 300 MG capsule  Commonly known as: OMNICEF   300 mg, Oral, 2 Times Daily         Changes to Medications      Instructions Start Date   glimepiride 1 MG tablet  Commonly known as: AMARYL  What changed: additional instructions   1 mg, Oral, Every Morning Before Breakfast         Continue These Medications      Instructions Start Date   amLODIPine 10  MG tablet  Commonly known as: NORVASC   TAKE 1 TABLET DAILY      atorvastatin 10 MG tablet  Commonly known as: Lipitor   10 mg, Oral, Daily      Calcium Carb-Cholecalciferol 600-400 MG-UNIT tablet   2 Times Daily      cloNIDine 0.1 MG tablet  Commonly known as: Catapres   0.1 mg, Oral, 2 Times Daily      CRANBERRY EXTRACT PO   Oral      D-Mannose 500 MG capsule   2 capsules, Oral, Daily      ferrous gluconate 240 (27 FE) MG tablet  Commonly known as: FERGON   Oral, 2 Times Daily With Meals      FLUoxetine 20 MG capsule  Commonly known as: PROzac   TAKE 1 CAPSULE DAILY      folic acid 400 MCG tablet  Commonly known as: FOLVITE   400 mcg, Oral, Daily      HYDROcodone-acetaminophen  MG per tablet  Commonly known as: NORCO   Take 1 tbalet every 4-6 hours MAX 4 tablets per day      Inulose Blood Sugar Support capsule   Oral, 2 Times Daily, Natures nutrition Blood sugar support       metFORMIN 500 MG tablet  Commonly known as: GLUCOPHAGE   TAKE 2 TABLETS TWICE A DAY      One-A-Day Essential tablet tablet  Generic drug: multivitamin   Oral      pantoprazole 40 MG EC tablet  Commonly known as: PROTONIX   40 mg, Oral, Daily      phentermine 37.5 MG tablet  Commonly known as: ADIPEX-P   37.5 mg, Oral, Daily      quinapril 40 MG tablet  Commonly known as: ACCUPRIL   TAKE 1 TABLET TWICE A DAY         Stop These Medications    ampicillin 500 MG capsule  Commonly known as: PRINCIPEN     nitrofurantoin (macrocrystal-monohydrate) 100 MG capsule  Commonly known as: Macrobid            Allergies   Allergen Reactions   • Sulfa Antibiotics Urinary Retention and Unknown - High Severity   • Citrus Swelling   • Nickel Rash         Discharge Disposition:  Home or Self Care    Diet:  Hospital:  Diet Order   Procedures   • Diet Cardiac, Diabetic/Consistent Carbs; Healthy Heart; Diabetic - Consistent Carb         Discharge Activity:         CODE STATUS:  Code Status and Medical Interventions:   Ordered at: 06/06/22 0237     Code Status  (Patient has no pulse and is not breathing):    CPR (Attempt to Resuscitate)     Medical Interventions (Patient has pulse or is breathing):    Full Support         No future appointments.        Time spent on Discharge including face to face service: minutes    Signature:    Electronically signed by Siomara Guzman DO, 06/06/22, 11:19 AM EDT.

## 2022-06-06 NOTE — PROGRESS NOTES
"Left vm asking patient to either call us back or send a [employer] message to Dr. Busch to let us know what medication that she is taking or if they started her on anything.       FOR ANT TO SAY      \"Maylin went to the emergency room last night.  Find out if they started her on treatment for the urinary tract infection that she has.  She has an allergy to sulfa and its not sensitive to ampicillin so if they did treat it find out what they used.  We may have to make changes.  It is also resistant to Macrobid.  Limited choices.\""

## 2022-06-06 NOTE — TELEPHONE ENCOUNTER
ALSO STARTED THE ANTIBIOTIC   cefdinir (OMNICEF) 300 MG capsule    WOULD LIKE A CALL BACK TO MAKE SURE OF THIS MEDICATION.      CALL BACK [phone]

## 2022-06-06 NOTE — TELEPHONE ENCOUNTER
Do you need to see her or just do a repeat UA in 10 days?    ER gave her omnicef bid for 4 days. She also left AMA

## 2022-06-06 NOTE — TELEPHONE ENCOUNTER
Caller: Maylin Garcia    Relationship to patient: Self    Best call back number: [phone]    Chief complaint: ER FOLLOW UP  Type of visit:     Requested date: 7 DAYS FROM 6/5/22    If rescheduling, when is the original appointment:      Additional notes:ALSO STARTED THE ANTIBIOTIC   cefdinir (OMNICEF) 300 MG capsule

## 2022-06-06 NOTE — ED PROVIDER NOTES
Subjective   Chief Complaint: Brain fog      HPI: Patient is a 76-year-old female presents to the ER today by private vehicle complaining of brain fog for the last 5 to 6 days.  States that she feels that she just cannot think straight.  She was seen by her primary care yesterday and labs were obtained, urinalysis was positive for leukocytes was sent for culture and did have growth.  She complains of pressure with urination and frequency but states she has increased her fluid intake over the last few days.  She has no abdominal pain.  She has had no diarrhea or constipation no recent illness or exposure.  No chest pain or shortness of breath no headache or recent fall or injury.  She has received her COVID-vaccine as well as her flu vaccine.  She has a history of hypertension, diabetes high cholesterol and anemia.  She reports she has chronic dizziness that is unchanged for her.  Congestion noted, patient states that this is a chronic issue for her and is not an acute problem.  She denies asymmetrical weakness.    PCP: Narayan          Review of Systems   Constitutional: Negative for fatigue and fever.   HENT: Positive for congestion.    Respiratory: Negative for cough and shortness of breath.    Cardiovascular: Negative for chest pain.   Gastrointestinal: Negative for abdominal pain, nausea and vomiting.   Genitourinary: Positive for frequency. Negative for dysuria, hematuria and pelvic pain.        Pressure with urination   Musculoskeletal: Negative.    Skin: Negative.    Neurological: Positive for dizziness.   Psychiatric/Behavioral: Positive for confusion.       Past Medical History:   Diagnosis Date   • Allergic    • Anemia    • Arthritis    • Cholelithiasis    • Clotting disorder (HCC)    • Colon polyp    • Diabetes mellitus (HCC)    • GERD (gastroesophageal reflux disease)    • Hyperlipidemia many years ago    treated with meds   • Hypertension    • Kidney stone    • Low back pain many  years   • Visual  impairment        Allergies   Allergen Reactions   • Sulfa Antibiotics Urinary Retention and Unknown - High Severity   • Citrus Swelling   • Nickel Rash       Past Surgical History:   Procedure Laterality Date   • CHOLECYSTECTOMY     • COLONOSCOPY     • TONSILLECTOMY         Family History   Problem Relation Age of Onset   • Osteoporosis Mother    • Arthritis Mother    • Heart disease Mother    • Arthritis Sister    • Cancer Sister    • Hyperlipidemia Sister    • Diabetes Sister    • Arthritis Sister    • Cancer Brother    • Arthritis Brother    • Heart disease Brother    • Hyperlipidemia Brother    • Diabetes Brother    • Anxiety disorder Brother    • Heart disease Brother        Social History     Socioeconomic History   • Marital status:    Tobacco Use   • Smoking status: Former Smoker     Packs/day: 1.50     Years: 15.00     Pack years: 22.50     Types: Cigarettes     Start date: 1967     Quit date: 10/31/1981     Years since quittin.6   • Smokeless tobacco: Never Used   • Tobacco comment: Quit smoking 41 years ago   Vaping Use   • Vaping Use: Never used   Substance and Sexual Activity   • Alcohol use: Never   • Drug use: No   • Sexual activity: Yes     Partners: Male     Birth control/protection: None           Objective   Physical Exam  Vitals reviewed.   Constitutional:       Appearance: She is obese. She is not toxic-appearing.   HENT:      Head: Normocephalic.      Right Ear: Hearing normal. There is impacted cerumen.      Left Ear: Hearing normal. There is impacted cerumen.      Nose: Congestion present.      Mouth/Throat:      Mouth: Mucous membranes are moist.      Pharynx: Oropharynx is clear.   Eyes:      Extraocular Movements: Extraocular movements intact.      Right eye: No nystagmus.      Left eye: No nystagmus.      Pupils: Pupils are equal, round, and reactive to light.   Cardiovascular:      Rate and Rhythm: Normal rate and regular rhythm.      Pulses: Normal pulses.      Heart  sounds: Normal heart sounds.   Pulmonary:      Effort: Pulmonary effort is normal.      Breath sounds: Normal breath sounds. No wheezing.   Abdominal:      General: Bowel sounds are normal.      Palpations: Abdomen is soft.      Tenderness: There is no abdominal tenderness.   Musculoskeletal:         General: Normal range of motion.      Cervical back: Normal range of motion.   Skin:     General: Skin is warm and dry.      Capillary Refill: Capillary refill takes less than 2 seconds.      Findings: No bruising.   Neurological:      General: No focal deficit present.      Mental Status: She is oriented to person, place, and time. Mental status is at baseline.      Motor: No weakness.      Comments: Patient is alert and oriented and appropriate, do notice repetitive questioning throughout assessment.    Thorough explanation of evaluation and testing.   Psychiatric:         Mood and Affect: Mood normal.         Behavior: Behavior normal.         Thought Content: Thought content normal.         Judgment: Judgment normal.         Procedures           ED Course  ED Course as of 06/06/22 0217   Sun Jun 05, 2022 2052 WBC, UA(!): 13-20 []   2052 Leukocytes, UA(!): Small (1+) []   2315 CT Head Without Contrast  Pending CT results. []   2322 Review of urine culture yesterday showed growth of gram negative.  []   2358 Patient is requesting to leave AGAINST MEDICAL ADVICE, she is alert oriented and appropriate but throughout my evaluation in the emergency room I have experience repetitive questioning and forgetfulness with this patient.  She states she has had brain fog for 5 days.  She does have a urinary tract infection with CBC and CMP essentially normal.  I discussed the risks of leaving and the benefits of staying.  Patient states that her son in law who is a physician does not believe she needs to be admitted.  I advised the patient this is my professional opinion.  []      ED Course User Index  [] Norman  "FLORA Aguirre           /67   Pulse 63   Temp 97.8 °F (36.6 °C) (Oral)   Resp 16   Ht 167.6 cm (66\")   Wt 95.5 kg (210 lb 8.6 oz)   SpO2 99%   BMI 33.98 kg/m²   Labs Reviewed   URINALYSIS W/ MICROSCOPIC IF INDICATED (NO CULTURE) - Abnormal; Notable for the following components:       Result Value    Leuk Esterase, UA Small (1+) (*)     All other components within normal limits   URINALYSIS, MICROSCOPIC ONLY - Abnormal; Notable for the following components:    RBC, UA 0-2 (*)     WBC, UA 13-20 (*)     Squamous Epithelial Cells, UA 3-6 (*)     All other components within normal limits   COMPREHENSIVE METABOLIC PANEL - Abnormal; Notable for the following components:    Glucose 101 (*)     ALT (SGPT) 44 (*)     All other components within normal limits    Narrative:     GFR Normal >60  Chronic Kidney Disease <60  Kidney Failure <15     RESPIRATORY PANEL PCR W/ COVID-19 (SARS-COV-2) DESEAN/JESSE/NIKOLE/PAD/COR/MAD/REJI IN-HOUSE, NP SWAB IN [facility], 3-4 HR TAT - Normal    Narrative:     In the setting of a positive respiratory panel with a viral infection PLUS a negative procalcitonin without other underlying concern for bacterial infection, consider observing off antibiotics or discontinuation of antibiotics and continue supportive care. If the respiratory panel is positive for atypical bacterial infection (Bordetella pertussis, Chlamydophila pneumoniae, or Mycoplasma pneumoniae), consider antibiotic de-escalation to target atypical bacterial infection.   CBC WITH AUTO DIFFERENTIAL - Normal   CBC AND DIFFERENTIAL    Narrative:     The following orders were created for panel order CBC & Differential.  Procedure                               Abnormality         Status                     ---------                               -----------         ------                     CBC Auto Differential[[id number]]        Normal              Final result                 Please view results for these tests on the individual " orders.     Medications   sodium chloride 0.9 % flush 10 mL (has no administration in time range)   cefTRIAXone (ROCEPHIN) 1 g in sodium chloride 0.9 % 100 mL IVPB (has no administration in time range)   sodium chloride 0.9 % infusion (75 mL/hr Intravenous New Bag 6/6/22 0105)   sodium chloride 0.9 % bolus 500 mL (0 mL Intravenous Stopped 6/5/22 2351)   cefTRIAXone (ROCEPHIN) in SWFI 1 gram/10ml IV PUSH syringe (1 g Intravenous Given 6/5/22 2351)     CT Head Without Contrast    Result Date: 6/5/2022   No acute intracranial abnormality visible by CT.   This examination was interpreted by Tono Spencer M.D. Electronically signed by:  Tono Spencer M.D.  6/5/2022 9:46 PM                                          MDM  Number of Diagnoses or Management Options  Altered mental status, unspecified altered mental status type  Urinary tract infection without hematuria, site unspecified  Diagnosis management comments: While in the emergency room patient was placed on a cardiac monitor IV was established.  Labs were reviewed from yesterdays PCP visit, urine culture showed positive growth and antibiotics were started in the emergency room.  Lab obtained noted a stable WBC, CMP was essentially normal.  Respiratory panel was negative.  Patient's complaint coming to the emergency room was persistent brain fog for 5 days with pressure with urination and frequency.  Patient stated on several occasions her concern as to why she was told yesterday that she did not have a urinary tract infection, but received a call today from her primary care who advised her that her urine culture was positive and with her continued brain fog that she should come to the emergency room.  I explained to the patient that her urinalysis was positive for leukocytes and had some white blood cells, but nitrites were absent and this is why a culture was sent.  Explained that this is something that is not uncommon as there is high antibiotic resistance  with urinary tract infections in our area and culture is definitive for diagnosis.  With patient's age, complaints and test results she will be admitted for continued antibiotic and fluids and observation.  Patient resisted admission voicing that she would like to be discharged, I discussed that with my emergency room evaluation I believe that she would need to be admitted for continued treatment and that she would need to leave AGAINST MEDICAL ADVICE.  Patient stated that she would not be leaving AGAINST MEDICAL ADVICE as this may affect her insurance.  I again reiterated the risks of leaving and the benefits of staying I spoke to her family as she requested and reiterated that I would not be discharging the patient from the emergency room.  She decided that she would stay for admission, spoke with Alyssa nurse practitioner with the hospitalist admitting physician will be Dr. Ta.      I spoke with the patient at the bedside regarding their plan of care, discharge instruction, home care, prescriptions, indications to return to the emergency department, and importance follow-up.  We discussed test results at the bedside, including incidental abnormal labs, radiological findings, understands need for follow-up with primary care or specialist if indicated.     Pt is aware that discharge does not mean that nothing is wrong but it indicates no emergency is present and they must continue care with follow-up as given below or physician of their choice      Chart review:    Comorbidities:  hypertension, arthritis, history of clotting disorder, diabetes, visual impairment, GERD, anemia, cholelithiasis, hyperlipidemia    Differentials: Urinary tract infection, electrolyte imbalance, dehydration, viral illness, CVA not all inclusive of differentials considered    Appropriate PPE worn throughout the care of this patient.           Amount and/or Complexity of Data Reviewed  Clinical lab tests: reviewed  Tests in the radiology  section of CPT®: reviewed    Patient Progress  Patient progress: stable      Final diagnoses:   Altered mental status, unspecified altered mental status type   Urinary tract infection without hematuria, site unspecified       ED Disposition  ED Disposition     ED Disposition   Decision to Admit    Condition   --    Comment   Level of Care: Telemetry [5]   Admitting Physician: JETHRO ANDREWS [[id number]]   Attending Physician: JETHRO ANDREWS [[id number]]               No follow-up provider specified.       Medication List      No changes were made to your prescriptions during this visit.          Rajwinder Austin, APRN  06/06/22 0218

## 2022-06-06 NOTE — CASE MANAGEMENT/SOCIAL WORK
Discharge Planning Assessment  [facility]     Patient Name: Maylin Garcia  MRN: [id number]  Today's Date: 6/6/2022    Admit Date: 6/5/2022     Discharge Needs Assessment     Row Name 06/06/22 0947       Living Environment    People in Home significant other    Name(s) of People in Home Lives with  Kaushal    Current Living Arrangements home    Primary Care Provided by self    Provides Primary Care For no one    Family Caregiver if Needed significant other    Able to Return to Prior Arrangements yes       Resource/Environmental Concerns    Resource/Environmental Concerns none    Transportation Concerns none       Transition Planning    Patient/Family Anticipates Transition to home with family    Patient/Family Anticipated Services at Transition     Transportation Anticipated family or friend will provide       Discharge Needs Assessment    Readmission Within the Last 30 Days no previous admission in last 30 days    Equipment Currently Used at Home cane, straight    Concerns to be Addressed denies needs/concerns at this time    Anticipated Changes Related to Illness none    Provided Post Acute Provider List? N/A    Provided Post Acute Provider Quality & Resource List? N/A               Discharge Plan     Row Name 06/06/22 0948       Plan    Plan DC PLAN: Routine home with     Patient/Family in Agreement with Plan yes    Provided Post Acute Provider List? N/A    Provided Post Acute Provider Quality & Resource List? N/A    Plan Comments Met with patient at bedside, From home with  Kaushal. Independent with ADL's, Has cane at home but does not use. PCP is Dr. Busch,Pharmacy is [facility] in [place]. Denies any transportation issues, able to afford medications. No further concerns.                 Demographic Summary     Row Name 06/06/22 0945       General Information    Admission Type observation    Arrived From emergency department    Referral Source emergency department    Reason  for Consult care coordination/care conference;discharge planning    Preferred Language English       Contact Information    Permission Granted to Share Info With     Contact Information Obtained for                Functional Status     Row Name 06/06/22 0946       Functional Status    Usual Activity Tolerance moderate    Current Activity Tolerance moderate       Functional Status, IADL    Medications independent    Meal Preparation independent    Housekeeping independent    Laundry independent    Shopping independent       Mental Status    General Appearance WDL WDL       Mental Status Summary    Recent Changes in Mental Status/Cognitive Functioning no changes              Met with patient in room wearing PPE: mask, face shield/goggles, gloves, gown.      Maintained distance greater than six feet and spent less than 15 minutes in the room.      Maty Peguero RN     Office phone: [phone]  Cell phone: [phone]

## 2022-06-06 NOTE — TELEPHONE ENCOUNTER
Komal lets just repeat the UA next week and see what it looks like.  I do not necessarily need to see her unless her confusion persists.

## 2022-06-06 NOTE — H&P
"    [facility]      Patient Name: Maylin Garcia  : 1945  MRN: [id number]  Primary Care Physician:  Abiodun Busch MD  Date of admission: 2022      Subjective      Chief Complaint: \" Brain Fog\"    History of Present Illness: Maylin Garcia is a very pleasant  76 y.o. female who [facility] complaining of being in a \"brain fog\" the past 5 to 6 days.  She is awake and alert and oriented x3 and answering appropriately.  No slurred speech, facial droop or focal deficits.  She reports she is usually very sharp but the past few days she has had to think about what she is going to say.  She reported some increased pressure and frequency with urination without dysuria.  She reports she saw her primary care physician yesterday and culture came back positive for gram-negative bacilli.  She denies any chest pain, fever cough congestion or shortness of air.  CT head per radiology was negative for acute abnormality.  Serum WBC was not elevated.  Urinalysis today shows 1+ leukocytes 13-20 WBCs 3-4 squamous epithelium no bacteria.  Given urine culture dated 2022 she was started on IV ceftriaxone and will be admitted for overnight observation.  Past medical history includes osteoarthritis, hypertension, diabetes mellitus type 2, GERD, depression and hyperlipidemia.      Review of Systems   Constitutional: Negative.   HENT: Negative.    Eyes: Negative.    Cardiovascular: Negative.    Respiratory: Negative.    Endocrine: Negative.    Hematologic/Lymphatic: Negative.    Skin: Negative.    Musculoskeletal: Positive for arthritis and back pain.   Gastrointestinal: Negative.    Genitourinary: Negative.    Neurological: Positive for difficulty with concentration.   Psychiatric/Behavioral: Negative.    Allergic/Immunologic: Negative.    All other systems reviewed and are negative.      Personal History     Past Medical History:   Diagnosis Date "   • Allergic    • Anemia    • Arthritis    • Cholelithiasis    • Clotting disorder (HCC)    • Colon polyp    • Diabetes mellitus (HCC)    • GERD (gastroesophageal reflux disease)    • Hyperlipidemia many years ago    treated with meds   • Hypertension    • Kidney stone    • Low back pain many  years   • Visual impairment        Past Surgical History:   Procedure Laterality Date   • CHOLECYSTECTOMY     • COLONOSCOPY     • TONSILLECTOMY         Family History: family history includes Anxiety disorder in her brother; Arthritis in her brother, mother, sister, and sister; Cancer in her brother and sister; Diabetes in her brother and sister; Heart disease in her brother, brother, and mother; Hyperlipidemia in her brother and sister; Osteoporosis in her mother. Otherwise pertinent FHx was reviewed and not pertinent to current issue.    Social History:  reports that she quit smoking about 40 years ago. Her smoking use included cigarettes. She started smoking about 55 years ago. She has a 22.50 pack-year smoking history. She has never used smokeless tobacco. She reports that she does not drink alcohol and does not use drugs.    Home Medications:  Prior to Admission Medications     Prescriptions Last Dose Informant Patient Reported? Taking?    amLODIPine (NORVASC) 10 MG tablet 6/4/2022  No Yes    TAKE 1 TABLET DAILY    ampicillin (PRINCIPEN) 500 MG capsule   No No    Take 1 capsule by mouth 4 (Four) Times a Day for 5 days.    atorvastatin (Lipitor) 10 MG tablet 6/5/2022  No Yes    Take 1 tablet by mouth Daily.    Calcium Carb-Cholecalciferol 600-400 MG-UNIT tablet 6/5/2022  Yes Yes    2 (Two) Times a Day.    cloNIDine (Catapres) 0.1 MG tablet 6/5/2022  No Yes    Take 1 tablet by mouth 2 (Two) Times a Day for 90 days.    CRANBERRY EXTRACT PO 6/5/2022  Yes Yes    Take  by mouth.    D-Mannose 500 MG capsule 6/5/2022 Medication Bottle Yes Yes    Take 2 capsules by mouth Daily.    ferrous gluconate (FERGON) 240 (27 FE) MG tablet  6/5/2022 Medication Bottle Yes Yes    Take  by mouth 2 (Two) Times a Day With Meals.    FLUoxetine (PROzac) 20 MG capsule 6/5/2022  No Yes    TAKE 1 CAPSULE DAILY    folic acid (FOLVITE) 400 MCG tablet 6/5/2022  Yes Yes    Take 400 mcg by mouth Daily.    glimepiride (AMARYL) 1 MG tablet   No No    Take 1 tablet by mouth Every Morning Before Breakfast.    Patient taking differently:  Take 1 mg by mouth Every Morning Before Breakfast. Takes half tablet qhs    HYDROcodone-acetaminophen (NORCO)  MG per tablet 6/5/2022  No Yes    Take 1 tbalet every 4-6 hours MAX 4 tablets per day    metFORMIN (GLUCOPHAGE) 500 MG tablet 6/5/2022  No Yes    TAKE 2 TABLETS TWICE A DAY    Multiple Vitamin (ONE-A-DAY ESSENTIAL) tablet 6/5/2022  Yes Yes    Take  by mouth.    nitrofurantoin, macrocrystal-monohydrate, (Macrobid) 100 MG capsule   No No    Take 1 capsule by mouth 2 (Two) Times a Day for 7 days.    pantoprazole (PROTONIX) 40 MG EC tablet 6/5/2022  No Yes    Take 1 tablet by mouth Daily.    phentermine (ADIPEX-P) 37.5 MG tablet More than a month  No No    TAKE 1 TABLET BY MOUTH DAILY    quinapril (ACCUPRIL) 40 MG tablet 6/5/2022  No Yes    TAKE 1 TABLET TWICE A DAY    Specialty Vitamins Products (Inulose Blood Sugar Support) capsule 6/5/2022 Self Yes Yes    Take  by mouth 2 (Two) Times a Day. Natures nutrition Blood sugar support            Allergies:  Allergies   Allergen Reactions   • Sulfa Antibiotics Urinary Retention and Unknown - High Severity   • Citrus Swelling   • Nickel Rash       Objective      Vitals:   Temp:  [97.8 °F (36.6 °C)] 97.8 °F (36.6 °C)  Heart Rate:  [55-81] 63  Resp:  [15-16] 16  BP: (136-158)/(63-83) 149/67    Physical Exam  Vitals reviewed.   Constitutional:       Appearance: Normal appearance. She is obese.   HENT:      Head: Normocephalic and atraumatic.      Right Ear: External ear normal.      Left Ear: External ear normal.      Nose: Nose normal.      Mouth/Throat:      Mouth: Mucous membranes  are moist.   Eyes:      Extraocular Movements: Extraocular movements intact.   Cardiovascular:      Rate and Rhythm: Normal rate and regular rhythm.      Pulses: Normal pulses.      Heart sounds: Normal heart sounds.   Pulmonary:      Effort: Pulmonary effort is normal.      Breath sounds: Normal breath sounds.   Abdominal:      Palpations: Abdomen is soft.   Genitourinary:     Comments: deferred  Musculoskeletal:         General: Normal range of motion.      Cervical back: Normal range of motion and neck supple.   Skin:     General: Skin is warm and dry.   Neurological:      General: No focal deficit present.      Mental Status: She is alert and oriented to person, place, and time.   Psychiatric:         Mood and Affect: Mood normal.         Behavior: Behavior normal.         Thought Content: Thought content normal.         Judgment: Judgment normal.         Result Review    Result Review:  I have personally reviewed the results from the time of this admission to 6/6/2022 02:37 EDT and agree with these findings:  [x]  Laboratory  [x]  Microbiology  [x]  Radiology  [x]  EKG/Telemetry   []  Cardiology/Vascular   []  Pathology  []  Old records  []  Other:  Most notable findings include: urine culture positive for GM - Bacilli, CT head negative per radiology, glucose 101, ALT 44    Assessment & Plan        Active Hospital Problems:  Active Hospital Problems    Diagnosis    • Altered mental status, unspecified altered mental status type    • Acute UTI (urinary tract infection)    • Acute UTI    • Depressive disorder    • Hyperlipidemia    • Hypertension    • Type 2 diabetes mellitus (HCC)    • Gastro-esophageal reflux disease with esophagitis    • Low back pain    • Overweight    • Osteoarthritis      Plan:     Altered mental status, CT head normal per radiology, no focal deficits no slurred speech, facial droop, likely secondary to metabolic encephalopathy see plan below    Acute UTI, gram-negative bacilli per culture  "dated 6/4/2022 hold home Macrobid and ampicillin, continue IV ceftriaxone, normal saline at 75 cc/h    Depressive disorder, reordered home Prozac    Hyperlipidemia reordered home statin    Hypertension, controlled on home quinapril lisinopril substituted here, on home Norvasc reordered monitor BP on home clonidine reordered    Type 2 diabetes mellitus controlled, hold home glimepiride and metformin while inpatient add SSI as needed with Accu-Cheks AC at bedtime low concentrated sweet diet    GERD, reordered home PPI    Low back pain, on home Norco, not reordered due to altered mental status reevaluate in a.m.    Overweight, BMI 33.98, home phentermine not reordered    Osteoarthritis, on home calcium vitamin D    DVT prophylaxis:  Mechanical DVT prophylaxis orders are present.    CODE STATUS:    Code Status (Patient has no pulse and is not breathing): CPR (Attempt to Resuscitate)  Medical Interventions (Patient has pulse or is breathing): Full Support    Admission Status:  I believe this patient meets observation status.    I discussed the patient's findings and my recommendations with patient.    This patient has been examined wearing appropriate Personal Protective Equipment . 06/06/22      Signature: Electronically signed by FLORA Gates, 06/06/22, 3:00 AM EDT.    Attending attestation:    I have reviewed the LUCIANA's note and agree with the documented findings and plan of care unless stated otherwise by my note.     76 y.o. female who presented to [facility] on 6/5/2022 complaining of being in a \"brain fog\" the past 5 to 6 days.  She is awake and alert and oriented x3 and answering appropriately.  No slurred speech, facial droop or focal deficits.  She reports she is usually very sharp but the past few days she has had to think about what she is going to say.  She reported some increased pressure and frequency with urination without dysuria.  UTI diagnosed on admission, started on " ceftriaxone, urine culture already collected from PCP outpatient, growing Klebsiella pneumonia, sensitivities noted.  Antibiotics changed to Omnicef on discharge.  Patient feels much better and wants to go home today.  Patient stable to discharge home today with follow-up with PCP outpatient.    Pertinent positives as noted in HPI/subjective.  All other systems were reviewed and are negative.    General: Awake, alert, NAD  Eyes: PERRL, EOMI, conjunctivae are clear  Cardiovascular: Regular rate and rhythm, no murmurs  Respiratory: Clear to auscultation bilaterally, no wheezing or rales, unlabored breathing  Abdomen: Soft, nontender, positive bowel sounds, no guarding  Neurologic: A&O, CN grossly intact, moves all extremities spontaneously  Musculoskeletal: Normal range of motion, no deformities  Skin: Warm, dry, intact    Assessment:  Acute UTI  Acute metabolic encephalopathy, resolved  Chronic depression  Hypertension  Hyperlipidemia  DM2  OA    Plan:  -Patient has significantly improved, alert and oriented x3, wants to go home today  -Urine culture noted, switch antibiotics to Omnicef on discharge  -Okay to discharge home today with outpatient follow-up  -Continue other home meds.    Patient discharged same day as admit, total time spent in H&P and discharge summary including face-to-face time is 45 minutes.      Electronically signed by Siomara Guzman DO, 06/06/22, 11:19 AM EDT.

## 2022-06-07 ENCOUNTER — TRANSITIONAL CARE MANAGEMENT TELEPHONE ENCOUNTER (OUTPATIENT)
Dept: CALL CENTER | Facility: HOSPITAL | Age: 77
End: 2022-06-07

## 2022-06-07 NOTE — OUTREACH NOTE
Call Center TCM Note    Flowsheet Row Responses   [facility] patient discharged from? Noe   Does the patient have one of the following disease processes/diagnoses(primary or secondary)? Other   TCM attempt successful? No   Unsuccessful attempts Attempt 1          Karla Bustillo MA    6/7/2022, 14:44 EDT

## 2022-06-07 NOTE — OUTREACH NOTE
Call Center TCM Note    Flowsheet Row Responses   [facility] patient discharged from? Noe   Does the patient have one of the following disease processes/diagnoses(primary or secondary)? Other   TCM attempt successful? Yes   Discharge diagnosis AMS, Acute UTI, T2DM   Is the patient having any side effects they believe may be caused by any medication additions or changes? No   Does the patient have all medications ordered at discharge? Yes   Is the patient taking all medications as directed (includes completed medication regime)? Yes   Does the patient have a primary care provider?  Yes   Does the patient have an appointment with their PCP within 7 days of discharge? No   Comments regarding PCP No TCM APPT but per pt PCP aware of admit, she is going to his ofc on 06/17/2022 for some repeat labs.    Has the patient kept scheduled appointments due by today? N/A   Has home health visited the patient within 72 hours of discharge? N/A   Psychosocial issues? No   Did the patient receive a copy of their discharge instructions? Yes   Nursing interventions Reviewed instructions with patient   What is the patient's perception of their health status since discharge? Improving   Is the patient/caregiver able to teach back signs and symptoms related to disease process for when to call PCP? Yes   Is the patient/caregiver able to teach back signs and symptoms related to disease process for when to call 911? Yes   Is the patient/caregiver able to teach back the hierarchy of who to call/visit for symptoms/problems? PCP, Specialist, Home health nurse, Urgent Care, ED, 911 Yes   If the patient is a current smoker, are they able to teach back resources for cessation? Not a smoker   TCM call completed? Yes   Wrap up additional comments Pt tired but feeling better. Cedfinir in place. d/c dx: AMS, Acute UTI, T2DM. No TCM APPT but per pt PCP aware of admit, she is going to his ofc on 06/17/2022 for some repeat labs.           Karla Fulton  PHILL Bustillo    6/7/2022, 16:30 EDT

## 2022-06-09 DIAGNOSIS — R41.89 COGNITIVE IMPAIRMENT: Primary | ICD-10-CM

## 2022-06-14 ENCOUNTER — TELEPHONE (OUTPATIENT)
Dept: FAMILY MEDICINE CLINIC | Facility: CLINIC | Age: 77
End: 2022-06-14

## 2022-06-14 RX ORDER — CEPHALEXIN 500 MG/1
500 CAPSULE ORAL 4 TIMES DAILY
Qty: 40 CAPSULE | Refills: 1 | Status: SHIPPED | OUTPATIENT
Start: 2022-06-14 | End: 2022-06-24

## 2022-06-14 NOTE — TELEPHONE ENCOUNTER
Komal --Maylin has been found to have a brain tumor.  Its about 2 cm and she is getting it worked up and evaluated through [facility].  She also now has a boil on the labia.  I called in some antibiotics for her to [facility].  [place].  She is going to have a another MRI done tomorrow I think she said about 1030 and when she gets out of that MRI and is coming home I am going to have her stop it at the office and I am going to look at this boil and possibly den it.  If we can den it and give her antibiotics she should clear it out.  I need to check though and see if it is a Bartholin gland cyst.  Sometimes they get pretty big and need more than just lancing.  It is going to mess up the morning or early afternoon schedule somewhat but it should not take long.  I have called and talked to the daughter about all this so you do not need to call them.  I have already called everything in.  Just be aware that tomorrow we will have to find a spot for her in between patients when they are coming home from there MRI.

## 2022-06-14 NOTE — TELEPHONE ENCOUNTER
Caller: Maylin Garcia    Relationship to patient: Self    Best call back number: [phone]    Where are you experiencing symptoms: VERY LARGE BOIL ON VAGINA    How long have you been experiencing symptoms:2-3 DAYS    PATIENT IS REQUESTING A CALL TO DISCUSS WHAT SHE CAN TAKE OR PUT ON BOIL    If a prescription is needed, what is your preferred pharmacy:   [id number] - [facility] [place], [place] - [address] - [phone]  - [phone] FX  [phone]

## 2022-06-15 ENCOUNTER — OFFICE VISIT (OUTPATIENT)
Dept: FAMILY MEDICINE CLINIC | Facility: CLINIC | Age: 77
End: 2022-06-15

## 2022-06-15 ENCOUNTER — LAB (OUTPATIENT)
Dept: FAMILY MEDICINE CLINIC | Facility: CLINIC | Age: 77
End: 2022-06-15

## 2022-06-15 VITALS
HEART RATE: 110 BPM | TEMPERATURE: 97.6 F | DIASTOLIC BLOOD PRESSURE: 82 MMHG | SYSTOLIC BLOOD PRESSURE: 150 MMHG | OXYGEN SATURATION: 98 % | RESPIRATION RATE: 16 BRPM

## 2022-06-15 DIAGNOSIS — L02.91 ABSCESS: ICD-10-CM

## 2022-06-15 DIAGNOSIS — L02.91 ABSCESS: Primary | ICD-10-CM

## 2022-06-15 DIAGNOSIS — D49.6 PRIMARY BRAIN TUMOR: ICD-10-CM

## 2022-06-15 DIAGNOSIS — L02.93 CARBUNCLE: ICD-10-CM

## 2022-06-15 PROCEDURE — 10060 I&D ABSCESS SIMPLE/SINGLE: CPT | Performed by: FAMILY MEDICINE

## 2022-06-15 PROCEDURE — 87070 CULTURE OTHR SPECIMN AEROBIC: CPT | Performed by: FAMILY MEDICINE

## 2022-06-15 PROCEDURE — 87205 SMEAR GRAM STAIN: CPT | Performed by: FAMILY MEDICINE

## 2022-06-15 NOTE — PROGRESS NOTES
"Chief Complaint  Abscess    Subjective      Maylin Garcia presents to [facility]  For abscess on vaginal area.     Abscess    Patient has recently been diagnosed with a primary brain tumor.  She has not undergone the complete metastatic work-up yet to find out if it is a metastatic lesion or if it is a definitely a primary brain tumor.  Once they find that out then she will have a more definitive treatment.    Objective   Vital Signs:  /82 (BP Location: Left arm)   Pulse 110   Temp 97.6 °F (36.4 °C) (Infrared)   Resp 16   SpO2 98%   Estimated body mass index is 33.98 kg/m² as calculated from the following:    Height as of 6/5/22: 167.6 cm (66\").    Weight as of 6/5/22: 95.5 kg (210 lb 8.6 oz).    BMI is >= 30 and <35. (Class 1 Obesity). The following options were offered after discussion;: weight loss educational material (shared in after visit summary), exercise counseling/recommendations and nutrition counseling/recommendations      Physical Exam  Vitals reviewed.   Constitutional:       Appearance: She is obese. She is ill-appearing.   Genitourinary:     General: Normal vulva.      Comments: Skin abscess over the right mons pubis area.  It looks like it was a boil or possibly a deep folliculitis that ruptured and drained prior to her coming to the office.  She has a Band-Aid over it which I removed in the lesion looks like a drained boil.  I put a swab down into the cavity below the skin to culture but the culture is unlikely to grow anything because the infection has drained on its own.  Neurological:      Mental Status: She is alert.        Result Review :       Incision & Drainage    Date/Time: 6/15/2022 4:01 PM  Performed by: Abiodun Busch MD  Authorized by: Abiodun Busch MD   Type: abscess  Body area: anogenital    Anesthesia:  Local Anesthetic: lidocaine 1% without epinephrine  Anesthetic total: 2 mL    Sedation:  Patient sedated: no    Scalpel size: " 11  Incision type: single straight  Complexity: simple  Drainage: bloody  Drainage amount: scant  Wound treatment: wound left open  Packing material: none  Patient tolerance: patient tolerated the procedure well with no immediate complications  Comments: Culture obtained             Assessment and Plan              Primary brain tumor (HCC)  Recently diagnosed.  Work-up is underway through [facility] but so far no answer as to the primary source if there is one or if the brain is the primary source.           Carbuncle    Current Assessment & Plan     Of mons pubis mainly on the right.  Lesion has drained on its own spontaneously.  I will start her on  Keflex 500 mg 4 times a day.  We did a culture of the wound.  We will let the culture decide if this is the correct antibiotic or not.  She may not grow anything at all since it is already draining spontaneously           Abscess - Primary    Relevant Orders    Wound Culture - Drainage, Labia (Completed)    Incision & Drainage               Follow Up Return in about 3 months (around 9/15/2022) for Recheck.  Patient was given instructions and counseling regarding her condition or for health maintenance advice. Please see specific information pulled into the AVS if appropriate.

## 2022-06-16 RX ORDER — SPIRONOLACTONE 25 MG/1
25 TABLET ORAL 2 TIMES DAILY
Qty: 180 TABLET | Refills: 2 | Status: SHIPPED | OUTPATIENT
Start: 2022-06-16 | End: 2022-07-30 | Stop reason: SDUPTHER

## 2022-06-16 RX ORDER — METOPROLOL SUCCINATE 50 MG/1
50 TABLET, EXTENDED RELEASE ORAL DAILY
Qty: 90 TABLET | Refills: 1 | Status: SHIPPED | OUTPATIENT
Start: 2022-06-16 | End: 2022-07-30 | Stop reason: SDUPTHER

## 2022-06-18 LAB
BACTERIA SPEC AEROBE CULT: NORMAL
GRAM STN SPEC: NORMAL
GRAM STN SPEC: NORMAL

## 2022-06-21 PROBLEM — D49.6 PRIMARY BRAIN TUMOR: Status: ACTIVE | Noted: 2022-06-21

## 2022-06-21 PROBLEM — L02.91 ABSCESS: Status: ACTIVE | Noted: 2022-06-21

## 2022-06-21 PROBLEM — L02.93 CARBUNCLE: Status: ACTIVE | Noted: 2022-06-21

## 2022-06-23 RX ORDER — DEXAMETHASONE 2 MG/1
2 TABLET ORAL
COMMUNITY
Start: 2022-06-22 | End: 2022-08-22

## 2022-06-23 RX ORDER — AMLODIPINE BESYLATE 10 MG/1
10 TABLET ORAL
COMMUNITY
Start: 2022-06-13 | End: 2022-06-24

## 2022-06-23 RX ORDER — LOSARTAN POTASSIUM 100 MG/1
100 TABLET ORAL DAILY
Qty: 30 TABLET | Refills: 11 | Status: SHIPPED | OUTPATIENT
Start: 2022-06-23 | End: 2022-06-24

## 2022-06-23 RX ORDER — FLUOXETINE HYDROCHLORIDE 20 MG/1
20 CAPSULE ORAL
COMMUNITY
Start: 2022-06-13 | End: 2022-08-31 | Stop reason: SDUPTHER

## 2022-06-24 DIAGNOSIS — D49.6 BRAIN TUMOR: Primary | ICD-10-CM

## 2022-06-24 RX ORDER — LOSARTAN POTASSIUM 100 MG/1
TABLET ORAL
Qty: 90 TABLET | Refills: 1 | Status: SHIPPED | OUTPATIENT
Start: 2022-06-24 | End: 2022-07-30

## 2022-06-25 ENCOUNTER — HOME HEALTH ADMISSION (OUTPATIENT)
Dept: HOME HEALTH SERVICES | Facility: HOME HEALTHCARE | Age: 77
End: 2022-06-25

## 2022-06-27 ENCOUNTER — TELEPHONE (OUTPATIENT)
Dept: FAMILY MEDICINE CLINIC | Facility: CLINIC | Age: 77
End: 2022-06-27

## 2022-06-27 DIAGNOSIS — Z87.898 H/O BRAIN TUMOR: Primary | ICD-10-CM

## 2022-06-27 DIAGNOSIS — D33.0 BENIGN NEOPLASM OF SUPRATENTORIAL REGION OF BRAIN: ICD-10-CM

## 2022-06-27 NOTE — TELEPHONE ENCOUNTER
Caller: AYANNA    Relationship to patient: Home Health    Best call back number: [phone]    Patient is needing: AYANNA WITH [facility] CALLED AND SAID THEY RECEIVED AN ORDER FROM DR. PUENTE FOR THIS PATIENT TO RECEIVE SPEECH AND OCCUPATIONAL THERAPY FROM THEM    THEY CAN DO THE OCCUPATIONAL THERAPY BUT THEY DO NOT OFFER SPEECH THERAPY SERVICES, THE OFFICE WILL NEED TO SEND THE ORDERS ELSEWHERE FOR SPEECH THERAPY

## 2022-06-27 NOTE — TELEPHONE ENCOUNTER
Patient is going to be referred to [facility] that offers both services to the patient.

## 2022-06-27 NOTE — TELEPHONE ENCOUNTER
[facility] received a referral on patient for speech therapy.  Their speech therapist will be out until mid-July. Are you okay with them waiting until then to see patient?  Please advise.

## 2022-06-28 NOTE — TELEPHONE ENCOUNTER
Left detailed message on voice mail for Griselda with [facility] and also gave message to patient's daughter Yolis, who was fine with the delay.

## 2022-07-11 ENCOUNTER — TELEPHONE (OUTPATIENT)
Dept: FAMILY MEDICINE CLINIC | Facility: CLINIC | Age: 77
End: 2022-07-11

## 2022-07-11 NOTE — TELEPHONE ENCOUNTER
Caller: [facility]    Best call back number: [phone]  DIPTI    [facility] MANAGER CALLED TO INFORM DR PUENTE THAT JOSEPH'S DAUGHTER WAS VERY ADAMANT ABOUT GETTING A SPEECH THERAPIST FOR HER MOTHER.    DIPTI IS SENDING A THERAPIST TO [facility] [place]. NO CALLBACK NECESSARY

## 2022-07-30 RX ORDER — METOPROLOL SUCCINATE 50 MG/1
50 TABLET, EXTENDED RELEASE ORAL DAILY
Qty: 90 TABLET | Refills: 3 | Status: SHIPPED | OUTPATIENT
Start: 2022-07-30 | End: 2022-08-31

## 2022-07-30 RX ORDER — PANTOPRAZOLE SODIUM 40 MG/1
40 TABLET, DELAYED RELEASE ORAL DAILY
Qty: 90 TABLET | Refills: 3 | Status: SHIPPED | OUTPATIENT
Start: 2022-07-30 | End: 2023-03-06 | Stop reason: SDUPTHER

## 2022-07-30 RX ORDER — AMLODIPINE BESYLATE 10 MG/1
10 TABLET ORAL
COMMUNITY
Start: 2022-06-13 | End: 2022-08-03

## 2022-07-30 RX ORDER — LOSARTAN POTASSIUM 100 MG/1
100 TABLET ORAL
COMMUNITY
Start: 2022-07-25 | End: 2022-11-03

## 2022-07-30 RX ORDER — SPIRONOLACTONE 25 MG/1
25 TABLET ORAL 2 TIMES DAILY
Qty: 180 TABLET | Refills: 3 | Status: SHIPPED | OUTPATIENT
Start: 2022-07-30 | End: 2022-11-22 | Stop reason: SDUPTHER

## 2022-08-01 ENCOUNTER — TELEPHONE (OUTPATIENT)
Dept: FAMILY MEDICINE CLINIC | Facility: CLINIC | Age: 77
End: 2022-08-01

## 2022-08-01 ENCOUNTER — LAB (OUTPATIENT)
Dept: FAMILY MEDICINE CLINIC | Facility: CLINIC | Age: 77
End: 2022-08-01

## 2022-08-01 DIAGNOSIS — D33.0 BENIGN NEOPLASM OF SUPRATENTORIAL REGION OF BRAIN: Primary | ICD-10-CM

## 2022-08-01 DIAGNOSIS — I10 PRIMARY HYPERTENSION: ICD-10-CM

## 2022-08-01 DIAGNOSIS — G35 MULTIPLE SCLEROSIS: ICD-10-CM

## 2022-08-01 DIAGNOSIS — D33.0 BENIGN NEOPLASM OF SUPRATENTORIAL REGION OF BRAIN: ICD-10-CM

## 2022-08-01 DIAGNOSIS — E78.2 MIXED HYPERLIPIDEMIA: ICD-10-CM

## 2022-08-01 DIAGNOSIS — E11.9 TYPE 2 DIABETES MELLITUS WITHOUT COMPLICATION, WITHOUT LONG-TERM CURRENT USE OF INSULIN: Primary | ICD-10-CM

## 2022-08-01 LAB — HBA1C MFR BLD: 6.4 % (ref 3.5–5.6)

## 2022-08-01 PROCEDURE — 85007 BL SMEAR W/DIFF WBC COUNT: CPT | Performed by: FAMILY MEDICINE

## 2022-08-01 PROCEDURE — 85025 COMPLETE CBC W/AUTO DIFF WBC: CPT | Performed by: FAMILY MEDICINE

## 2022-08-01 PROCEDURE — 84443 ASSAY THYROID STIM HORMONE: CPT | Performed by: FAMILY MEDICINE

## 2022-08-01 PROCEDURE — 83036 HEMOGLOBIN GLYCOSYLATED A1C: CPT | Performed by: FAMILY MEDICINE

## 2022-08-01 PROCEDURE — 36415 COLL VENOUS BLD VENIPUNCTURE: CPT | Performed by: FAMILY MEDICINE

## 2022-08-01 PROCEDURE — 84165 PROTEIN E-PHORESIS SERUM: CPT | Performed by: FAMILY MEDICINE

## 2022-08-01 PROCEDURE — 80053 COMPREHEN METABOLIC PANEL: CPT | Performed by: FAMILY MEDICINE

## 2022-08-01 PROCEDURE — 81001 URINALYSIS AUTO W/SCOPE: CPT | Performed by: FAMILY MEDICINE

## 2022-08-01 NOTE — TELEPHONE ENCOUNTER
Spoke with patient and scheduled a [facility] lab appt today at 3pm.      Patient wants an additional lab test done today but cannot remember what it is.  Said she had it done twice before and it had to do with cancer and she was worried about it and PMJ told her not to worry.  Said it was like a month ago and she has had it done only twice before.  She wants that added to her labs today at 3pm.

## 2022-08-01 NOTE — TELEPHONE ENCOUNTER
Caller: Maylin Garcia    Relationship: Self    Best call back number: [phone]    What orders are you requesting (i.e. lab or imaging): PATIENT'S DAUGHTER SPOKE TO DR. PUENTE YESTERDAY, AND HE TOLD HER THAT PATIENT SHOULD COME IN TO HAVE LABS DONE FOLLOWING A HOSPITAL STAY    SHE WAS DISCHARGED FROM THE HOSPITAL YESTERDAY AFTER BEING HOSPITALIZED WITH A BRAIN TUMOR     In what timeframe would the patient need to come in: ASAP    Where will you receive your lab/imaging services: OFFICE     Additional notes: REQUESTED CALLBACK TO SCHEDULE LAB APPOINTMENT

## 2022-08-02 LAB
ALBUMIN SERPL ELPH-MCNC: 3.6 G/DL (ref 2.9–4.4)
ALBUMIN SERPL-MCNC: 4 G/DL (ref 3.5–5.2)
ALBUMIN/GLOB SERPL: 1.3 {RATIO} (ref 0.7–1.7)
ALBUMIN/GLOB SERPL: 1.8 G/DL
ALP SERPL-CCNC: 58 U/L (ref 39–117)
ALPHA1 GLOB SERPL ELPH-MCNC: 0.3 G/DL (ref 0–0.4)
ALPHA2 GLOB SERPL ELPH-MCNC: 0.9 G/DL (ref 0.4–1)
ALT SERPL W P-5'-P-CCNC: 93 U/L (ref 1–33)
ANION GAP SERPL CALCULATED.3IONS-SCNC: 12 MMOL/L (ref 5–15)
AST SERPL-CCNC: 20 U/L (ref 1–32)
B-GLOBULIN SERPL ELPH-MCNC: 1 G/DL (ref 0.7–1.3)
BACTERIA UR QL AUTO: NORMAL /HPF
BILIRUB SERPL-MCNC: 0.3 MG/DL (ref 0–1.2)
BILIRUB UR QL STRIP: NEGATIVE
BUN SERPL-MCNC: 28 MG/DL (ref 8–23)
BUN/CREAT SERPL: 28.9 (ref 7–25)
CALCIUM SPEC-SCNC: 9.7 MG/DL (ref 8.6–10.5)
CHLORIDE SERPL-SCNC: 101 MMOL/L (ref 98–107)
CLARITY UR: CLEAR
CO2 SERPL-SCNC: 28 MMOL/L (ref 22–29)
COLOR UR: YELLOW
CREAT SERPL-MCNC: 0.97 MG/DL (ref 0.57–1)
DEPRECATED RDW RBC AUTO: 51.3 FL (ref 37–54)
EGFRCR SERPLBLD CKD-EPI 2021: 61.4 ML/MIN/1.73
ERYTHROCYTE [DISTWIDTH] IN BLOOD BY AUTOMATED COUNT: 14.5 % (ref 12.3–15.4)
GAMMA GLOB SERPL ELPH-MCNC: 0.6 G/DL (ref 0.4–1.8)
GLOBULIN SER CALC-MCNC: 2.7 G/DL (ref 2.2–3.9)
GLOBULIN UR ELPH-MCNC: 2.2 GM/DL
GLUCOSE SERPL-MCNC: 86 MG/DL (ref 65–99)
GLUCOSE UR STRIP-MCNC: NEGATIVE MG/DL
HCT VFR BLD AUTO: 38.1 % (ref 34–46.6)
HGB BLD-MCNC: 12.5 G/DL (ref 12–15.9)
HGB UR QL STRIP.AUTO: NEGATIVE
HYALINE CASTS UR QL AUTO: NORMAL /LPF
KETONES UR QL STRIP: NEGATIVE
LABORATORY COMMENT REPORT: ABNORMAL
LEUKOCYTE ESTERASE UR QL STRIP.AUTO: NEGATIVE
LYMPHOCYTES # BLD MANUAL: 1.98 10*3/MM3 (ref 0.7–3.1)
LYMPHOCYTES NFR BLD MANUAL: 4.1 % (ref 5–12)
M PROTEIN SERPL ELPH-MCNC: 0.3 G/DL
MCH RBC QN AUTO: 32.1 PG (ref 26.6–33)
MCHC RBC AUTO-ENTMCNC: 32.8 G/DL (ref 31.5–35.7)
MCV RBC AUTO: 97.7 FL (ref 79–97)
METAMYELOCYTES NFR BLD MANUAL: 1 % (ref 0–0)
MONOCYTES # BLD: 0.57 10*3/MM3 (ref 0.1–0.9)
NEUTROPHILS # BLD AUTO: 11.17 10*3/MM3 (ref 1.7–7)
NEUTROPHILS NFR BLD MANUAL: 80.6 % (ref 42.7–76)
NITRITE UR QL STRIP: NEGATIVE
NRBC BLD AUTO-RTO: 0.1 /100 WBC (ref 0–0.2)
NRBC SPEC MANUAL: 1 /100 WBC (ref 0–0.2)
PH UR STRIP.AUTO: 6 [PH] (ref 5–8)
PLAT MORPH BLD: NORMAL
PLATELET # BLD AUTO: 237 10*3/MM3 (ref 140–450)
PMV BLD AUTO: 10.3 FL (ref 6–12)
POTASSIUM SERPL-SCNC: 4.8 MMOL/L (ref 3.5–5.2)
PROT PATTERN SERPL ELPH-IMP: ABNORMAL
PROT SERPL-MCNC: 6.2 G/DL (ref 6–8.5)
PROT SERPL-MCNC: 6.3 G/DL (ref 6–8.5)
PROT UR QL STRIP: ABNORMAL
RBC # BLD AUTO: 3.9 10*6/MM3 (ref 3.77–5.28)
RBC # UR STRIP: NORMAL /HPF
RBC MORPH BLD: NORMAL
REF LAB TEST METHOD: NORMAL
SODIUM SERPL-SCNC: 141 MMOL/L (ref 136–145)
SP GR UR STRIP: 1.02 (ref 1–1.03)
SQUAMOUS #/AREA URNS HPF: NORMAL /HPF
TSH SERPL DL<=0.05 MIU/L-ACNC: 0.95 UIU/ML (ref 0.27–4.2)
UROBILINOGEN UR QL STRIP: ABNORMAL
VARIANT LYMPHS NFR BLD MANUAL: 14.3 % (ref 19.6–45.3)
WBC # UR STRIP: NORMAL /HPF
WBC MORPH BLD: NORMAL
WBC NRBC COR # BLD: 13.86 10*3/MM3 (ref 3.4–10.8)

## 2022-08-08 ENCOUNTER — TELEPHONE (OUTPATIENT)
Dept: FAMILY MEDICINE CLINIC | Facility: CLINIC | Age: 77
End: 2022-08-08

## 2022-08-08 NOTE — TELEPHONE ENCOUNTER
Caller: GALO SAHNI    Relationship: Emergency Contact    Best call back number: [phone]    What orders are you requesting (i.e. lab or imaging):  HOME VISITING SPEECH THERAPIST   In what timeframe would the patient need to come in: AS SOON AS POSSIBLE    Where will you receive your lab/imaging services: HOME IN [place]    Additional notes:

## 2022-08-09 NOTE — TELEPHONE ENCOUNTER
HUB TO SHARE      I have called Araseli [phone] with [facility] to see what is up. They called and said they were going to send out a therapist on 7/11/22    I need to know if they need us to do anything?

## 2022-08-09 NOTE — TELEPHONE ENCOUNTER
Komal - I was getting ready to call Yolis and just wanted to clarify - they are sending out a speech therapist on 7/11 or 8/11?

## 2022-08-09 NOTE — TELEPHONE ENCOUNTER
Let Yolis know we cannot find a ST for her Mom .   She may have to check with a rehab facility and do a outpatient program somewhere.

## 2022-08-09 NOTE — TELEPHONE ENCOUNTER
Komal order a speech therapist for Maylin Garcia.  It needs to be a Home Visiting  Speech Therapist.  I am not sure who has that sort of service.

## 2022-08-09 NOTE — TELEPHONE ENCOUNTER
[facility] does not have any speech therapist nor does [facility] does not have anyone either    [facility] does have one but she is not taking any new patients at this time,

## 2022-08-09 NOTE — TELEPHONE ENCOUNTER
I TRIED TO REACH LUIS LITTLE BIT AGO NO ANSWER. CAN YOU TRY AND SEE IF YOU GET AN ANSWER IF NOT WILL YOU SEND IT BACK TO ME AND ILL TRY AGAIN.

## 2022-08-10 RX ORDER — CLONIDINE HYDROCHLORIDE 0.1 MG/1
TABLET ORAL
Qty: 180 TABLET | Refills: 2 | Status: SHIPPED | OUTPATIENT
Start: 2022-08-10 | End: 2023-02-22

## 2022-08-11 DIAGNOSIS — M19.91 PRIMARY OSTEOARTHRITIS, UNSPECIFIED SITE: ICD-10-CM

## 2022-08-12 RX ORDER — HYDROCODONE BITARTRATE AND ACETAMINOPHEN 10; 325 MG/1; MG/1
TABLET ORAL
Qty: 360 TABLET | Refills: 0 | Status: SHIPPED | OUTPATIENT
Start: 2022-08-12 | End: 2022-08-31 | Stop reason: SDUPTHER

## 2022-08-19 DIAGNOSIS — Z87.440 HISTORY OF RECURRENT UTIS: Primary | ICD-10-CM

## 2022-08-19 RX ORDER — NITROFURANTOIN 25; 75 MG/1; MG/1
100 CAPSULE ORAL 2 TIMES DAILY
Qty: 10 CAPSULE | Refills: 0 | Status: SHIPPED | OUTPATIENT
Start: 2022-08-19 | End: 2022-08-24

## 2022-08-26 ENCOUNTER — HOSPITAL ENCOUNTER (OUTPATIENT)
Dept: ULTRASOUND IMAGING | Facility: HOSPITAL | Age: 77
Discharge: HOME | End: 2022-08-26
Admitting: FAMILY MEDICINE

## 2022-08-26 DIAGNOSIS — Z87.440 HISTORY OF RECURRENT UTIS: ICD-10-CM

## 2022-08-26 PROCEDURE — 76775 US EXAM ABDO BACK WALL LIM: CPT

## 2022-08-27 DIAGNOSIS — N28.89 RENAL MASS, LEFT: Primary | ICD-10-CM

## 2022-08-27 NOTE — PROGRESS NOTES
Komal tell Maylin she has a 2 cm cyst/mass in her left kidney.   Now we need to do a MRI to make sure it is just a cyst.  Ordered.

## 2022-08-29 ENCOUNTER — TELEPHONE (OUTPATIENT)
Dept: FAMILY MEDICINE CLINIC | Facility: CLINIC | Age: 77
End: 2022-08-29

## 2022-08-29 DIAGNOSIS — F41.8 SITUATIONAL ANXIETY: Primary | ICD-10-CM

## 2022-08-29 RX ORDER — QUINAPRIL 40 MG/1
TABLET ORAL
Qty: 180 TABLET | Refills: 3 | Status: SHIPPED | OUTPATIENT
Start: 2022-08-29 | End: 2023-01-19 | Stop reason: SDUPTHER

## 2022-08-29 RX ORDER — ATORVASTATIN CALCIUM 10 MG/1
TABLET, FILM COATED ORAL
Qty: 90 TABLET | Refills: 3 | Status: SHIPPED | OUTPATIENT
Start: 2022-08-29

## 2022-08-29 RX ORDER — DIAZEPAM 10 MG/1
TABLET ORAL
Qty: 3 TABLET | Refills: 0 | Status: SHIPPED | OUTPATIENT
Start: 2022-08-29 | End: 2022-11-03 | Stop reason: SDUPTHER

## 2022-08-29 RX ORDER — GLIMEPIRIDE 1 MG/1
TABLET ORAL
Qty: 90 TABLET | Refills: 3 | Status: SHIPPED | OUTPATIENT
Start: 2022-08-29

## 2022-08-29 NOTE — TELEPHONE ENCOUNTER
Caller: GALO SAHNI    Relationship: Emergency Contact    Best call back number: [phone]    Caller requesting test results: PATIENT'S DAUGHTER, SHE IS ON  VERBAL    What test was performed: KIDNEY ULTRASOUND     When was the test performed: 08/27/22    Where was the test performed: [facility]     Additional notes: REQUESTED CALLBACK TO GO OVER RESULTS, SHE SAID HER MOM DID NOT UNDERSTAND THEM

## 2022-08-29 NOTE — TELEPHONE ENCOUNTER
[address]     Venofer Visit    NAME:  Charanjit Moore  YOB: 1955  MEDICAL RECORD NUMBER:  [id number]  Episode Date:  8/31/2020    Patient arrived to [facility]   [] per wheelchair   [x] ambulatory     Has the patient had a previous problem with Venofer Infusion? Yes - patient had severe abdominal pain and constipation last week so her treatment was held and she was sent to the ER. Constipation and abdominal pain were resolved with Miralax and Bentyl. Patient reports that she has been feeling much better since then. States that her last dose of Bentyl will be 9/2/2020. Ordered to have CBC drawn with treatment today but CBC was drawn when patient went to the ER on 8/25/2020 with HGB being 11.1 and HCT being 34.7. Results sent to Dr Bong Streeter for review and patient instructed to follow up with Dr Bong Streeter. Encouraged patient to continue using Miralax as needed and to increase dietary fiber and fluid intake. Written instructions given via AVS.     Any current infection or illness? No     Patient on antibiotics? No     History of Hypertension? No    Is the patient experiencing any:  Fatigue:   []   None  [x]   Increase over baseline but not altering normal activities - patient reports that she still needs some rest periods but that she definitely has more energy than she did before receiving Venofer infusions. []   Moderate of causing difficulty performing some activities  []   Severe or loss of ability to perform some activities  []   Bedridden or disabling     Dizziness or Lightheadedness:   []   None  [x]   No Interference - only with quick position changes. []   Interferes with functioning but not activities of daily living  []   Interferes with daily activies  []   Bedridden or disabling    Shortness of Breath:   []   None   [x]   Dyspneic on exertion - only with moderate exertion - walking long distances and climbing stairs.    []   Dyspnea with Spoke with daughter, she stated that the hospital scheduling said they were going to do her MRI w & WO contrast. You ordered it WO contrast. I still see that the order in the system is for WO contrast as well. Does it matter either way to you? She is scheduled for Wednesday morning.   normal activities  []   Dyspnea at rest    Edema: slight amount of non-pitting in bilateral ankles. Tachycardia: No    Heart Palpitations: No      Chest Pain: No       Current Lab Data:  Hemoglobin/Hematocrit:    Lab Results   Component Value Date    HGB 11.1 08/25/2020    HCT 34.7 08/25/2020     IRON:    Lab Results   Component Value Date    IRON 24 07/10/2020     Iron Saturation:    Lab Results   Component Value Date    LABIRON 10 07/10/2020     TIBC:    Lab Results   Component Value Date    TIBC 240 07/10/2020     FERRITIN:    Lab Results   Component Value Date    FERRITIN 58.7 09/15/2012       Dose Administered: 200 mg  Todays dose is number 5 out of 5 ordered for this patient. Response to treatment:  Well tolerated by patient. Education:    Verbal and written discharge instructions reviewed with patient and . Both verbalized understanding. Written copy given via AVS     Patient instructed to follow up with Dr Ree Flores regarding CBC results and to continue with constipation prevention as discussed. Patient and  verbalize understanding.     Electronically signed by Watson Jones RN on 8/31/2020 at 4:59 PM

## 2022-08-31 ENCOUNTER — HOSPITAL ENCOUNTER (OUTPATIENT)
Dept: MRI IMAGING | Facility: HOSPITAL | Age: 77
Discharge: HOME | End: 2022-08-31
Admitting: FAMILY MEDICINE

## 2022-08-31 DIAGNOSIS — M19.91 PRIMARY OSTEOARTHRITIS, UNSPECIFIED SITE: ICD-10-CM

## 2022-08-31 DIAGNOSIS — N28.89 RENAL MASS, LEFT: ICD-10-CM

## 2022-08-31 PROCEDURE — 74181 MRI ABDOMEN W/O CONTRAST: CPT

## 2022-08-31 RX ORDER — METOPROLOL SUCCINATE 50 MG/1
TABLET, EXTENDED RELEASE ORAL
COMMUNITY
Start: 2022-08-17 | End: 2022-11-22 | Stop reason: SDUPTHER

## 2022-08-31 RX ORDER — LORAZEPAM 1 MG/1
TABLET ORAL
COMMUNITY
Start: 2022-08-24 | End: 2022-11-06

## 2022-08-31 RX ORDER — HYDROXYZINE HYDROCHLORIDE 25 MG/1
25 TABLET, FILM COATED ORAL 4 TIMES DAILY PRN
COMMUNITY
Start: 2022-07-29 | End: 2023-02-04

## 2022-08-31 RX ORDER — HEPARIN SODIUM 5000 [USP'U]/ML
INJECTION, SOLUTION INTRAVENOUS; SUBCUTANEOUS
COMMUNITY
Start: 2022-07-29 | End: 2022-11-06

## 2022-08-31 RX ORDER — HEPARIN SODIUM 5000 [USP'U]/ML
INJECTION INTRAVENOUS; SUBCUTANEOUS
COMMUNITY
Start: 2022-08-17 | End: 2022-08-31

## 2022-08-31 RX ORDER — FLUOXETINE HYDROCHLORIDE 20 MG/1
40 CAPSULE ORAL DAILY
Qty: 60 CAPSULE | Refills: 11 | Status: SHIPPED | OUTPATIENT
Start: 2022-08-31 | End: 2023-02-27

## 2022-08-31 RX ORDER — DOXYCYCLINE HYCLATE 50 MG/1
1 CAPSULE, GELATIN COATED ORAL 2 TIMES DAILY WITH MEALS
COMMUNITY
Start: 2022-07-29 | End: 2022-09-05

## 2022-08-31 RX ORDER — HYDROCODONE BITARTRATE AND ACETAMINOPHEN 10; 325 MG/1; MG/1
TABLET ORAL
Qty: 360 TABLET | Refills: 0 | Status: SHIPPED | OUTPATIENT
Start: 2022-08-31 | End: 2022-11-10 | Stop reason: SDUPTHER

## 2022-08-31 RX ORDER — DOCUSATE SODIUM 50 MG AND SENNOSIDES 8.6 MG 8.6; 5 MG/1; MG/1
1 TABLET, FILM COATED ORAL 2 TIMES DAILY
COMMUNITY
Start: 2022-07-29 | End: 2022-08-31

## 2022-08-31 RX ORDER — PREDNISONE 20 MG/1
TABLET ORAL
COMMUNITY
Start: 2022-07-30 | End: 2022-09-05

## 2022-08-31 RX ORDER — GABAPENTIN 100 MG/1
100 CAPSULE ORAL 3 TIMES DAILY
Qty: 90 CAPSULE | Refills: 6 | Status: SHIPPED | OUTPATIENT
Start: 2022-08-31 | End: 2022-09-10 | Stop reason: SDUPTHER

## 2022-09-05 DIAGNOSIS — R30.0 DYSURIA: Primary | ICD-10-CM

## 2022-09-05 RX ORDER — CIPROFLOXACIN 500 MG/1
500 TABLET, FILM COATED ORAL 2 TIMES DAILY
Qty: 14 TABLET | Refills: 1 | Status: SHIPPED | OUTPATIENT
Start: 2022-09-05 | End: 2022-09-12

## 2022-09-06 ENCOUNTER — LAB (OUTPATIENT)
Dept: FAMILY MEDICINE CLINIC | Facility: CLINIC | Age: 77
End: 2022-09-06

## 2022-09-06 LAB
BACTERIA UR QL AUTO: ABNORMAL /HPF
BILIRUB UR QL STRIP: NEGATIVE
CLARITY UR: CLEAR
COLOR UR: YELLOW
GLUCOSE UR STRIP-MCNC: NEGATIVE MG/DL
HGB UR QL STRIP.AUTO: NEGATIVE
HYALINE CASTS UR QL AUTO: ABNORMAL /LPF
KETONES UR QL STRIP: ABNORMAL
LEUKOCYTE ESTERASE UR QL STRIP.AUTO: ABNORMAL
MUCOUS THREADS URNS QL MICRO: ABNORMAL /HPF
NITRITE UR QL STRIP: NEGATIVE
PH UR STRIP.AUTO: 5.5 [PH] (ref 5–8)
PROT UR QL STRIP: NEGATIVE
RBC # UR STRIP: ABNORMAL /HPF
REF LAB TEST METHOD: ABNORMAL
SP GR UR STRIP: 1.01 (ref 1–1.03)
SQUAMOUS #/AREA URNS HPF: ABNORMAL /HPF
UROBILINOGEN UR QL STRIP: ABNORMAL
WBC # UR STRIP: ABNORMAL /HPF

## 2022-09-06 PROCEDURE — 81001 URINALYSIS AUTO W/SCOPE: CPT | Performed by: FAMILY MEDICINE

## 2022-09-10 RX ORDER — GRANULES FOR ORAL 3 G/1
3 POWDER ORAL ONCE
Qty: 3 G | Refills: 0 | Status: SHIPPED | OUTPATIENT
Start: 2022-09-10 | End: 2022-09-10

## 2022-09-10 RX ORDER — GABAPENTIN 100 MG/1
100 CAPSULE ORAL 3 TIMES DAILY
Qty: 90 CAPSULE | Refills: 6 | Status: SHIPPED | OUTPATIENT
Start: 2022-09-10 | End: 2022-11-03 | Stop reason: SDUPTHER

## 2022-09-10 RX ORDER — GABAPENTIN 100 MG/1
100 CAPSULE ORAL 3 TIMES DAILY
Qty: 100 CAPSULE | Refills: 6 | Status: SHIPPED | OUTPATIENT
Start: 2022-09-10 | End: 2023-02-22

## 2022-09-13 ENCOUNTER — TELEPHONE (OUTPATIENT)
Dept: FAMILY MEDICINE CLINIC | Facility: CLINIC | Age: 77
End: 2022-09-13

## 2022-09-13 NOTE — TELEPHONE ENCOUNTER
Caller: Maylin Garcia    Relationship to patient: Self    Best call back number: [phone]    Patient is needing:    PATIENT SAID SHE IS CURRENTLY TAKING ALLERGRA AND DR. PUENTE SENT IN STAHIST FOR HER, SHE IS NOT SURE WHETHER TO TAKE BOTH OR STOP THE ALLERGEA FIRST BEFORE TAKING THE STAHIST     SHE SAID THE DOSAGE SAYS TAKE ONE TABLET BY MOUTH TWICE DAY, AND SHE IS NOT SURE HOW MANY DAYS SHE SHOULD DO THAT     REQUESTED CALLBACK

## 2022-09-13 NOTE — TELEPHONE ENCOUNTER
Caller: Maylin Garcia    Relationship to patient: Self    Best call back number: [phone]    Chief complaint: WATERY EYES, SORE THROAT, RUNNY NOSE     Type of visit: SAME DAY, OFFICE     Requested date: TODAY, 09/13     If rescheduling, when is the original appointment: N/A     Additional notes: PATIENT CALLED AND SAID SHE STARTED HAVING TROUBLE SEEING A FEW DAYS AGO, SHE SAID THAT'S BEEN GOING ON A LONG TIME BUT IS GETTING WORSE    SHE SAID SHE ALSO HAS A SORE THROAT AND RUNNY NOSE    SHE CALLED AND ASKED IF DR. PUENTE COULD SEE HER TODAY AS A WORK IN

## 2022-09-13 NOTE — TELEPHONE ENCOUNTER
Tell Maylin she should take both.  One Allegra every morning and she can take a stahist with it.    Take the second stahist before 6 PM at night so you dont stay up.

## 2022-09-13 NOTE — TELEPHONE ENCOUNTER
Spoke with patient and offered an appt today with CMSANJIV at 11am but patient declined and said she has decided to see her eye doctor today and no longer needs an appt with us.

## 2022-09-13 NOTE — TELEPHONE ENCOUNTER
Caller: Jose Maylin SANJIV    Relationship: Self    Best call back number: [phone]    What medications are you currently taking:   Current Outpatient Medications on File Prior to Visit   Medication Sig Dispense Refill   • amLODIPine (NORVASC) 10 MG tablet TAKE 1 TABLET DAILY 90 tablet 3   • atorvastatin (LIPITOR) 10 MG tablet TAKE 1 TABLET DAILY 90 tablet 3   • Chlorcyclizine-Pseudoephed 25-60 MG tablet Take 1 tablet by mouth 2 (Two) Times a Day for 30 days. 60 tablet 2   • [] ciprofloxacin (Cipro) 500 MG tablet Take 1 tablet by mouth 2 (Two) Times a Day for 7 days. 14 tablet 1   • cloNIDine (CATAPRES) 0.1 MG tablet TAKE 1 TABLET BY MOUTH TWICE DAILY 180 tablet 2   • CRANBERRY EXTRACT PO Take  by mouth.     • D-Mannose 500 MG capsule Take 2 capsules by mouth Daily.     • diazePAM (Valium) 10 MG tablet Take one tablet about 3-4 hours prior to procedure.   Take second tablet on arrival to hospital.   Take 3rd tablet only if needed at time of procedure. 3 tablet 0   • FLUoxetine (PROzac) 20 MG capsule Take 2 capsules by mouth Daily for 30 days. 60 capsule 11   • folic acid (FOLVITE) 400 MCG tablet Take 400 mcg by mouth Daily.     • gabapentin (NEURONTIN) 100 MG capsule Take 1 capsule by mouth 3 (Three) Times a Day. 100 capsule 6   • gabapentin (NEURONTIN) 100 MG capsule Take 1 capsule by mouth 3 (Three) Times a Day for 30 days. 90 capsule 6   • glimepiride (AMARYL) 1 MG tablet TAKE 1 TABLET EVERY MORNING BEFORE BREAKFAST 90 tablet 3   • Heparin Sodium, Porcine, (heparin, porcine,) 5000 UNIT/ML injection INJECT 1 ML SUBCUTANEOUSLY EVERY 12 HOURS     • HYDROcodone-acetaminophen (NORCO)  MG per tablet Take 1 tbalet every 4-6 hours MAX 4 tablets per day 360 tablet 0   • hydrOXYzine (ATARAX) 25 MG tablet Take 25 mg by mouth 4 (Four) Times a Day As Needed.     • LORazepam (ATIVAN) 1 MG tablet      • losartan (COZAAR) 100 MG tablet 100 mg.     • metFORMIN (GLUCOPHAGE) 500 MG tablet TAKE 2 TABLETS TWICE A DAY  360 tablet 3   • metoprolol succinate XL (TOPROL-XL) 50 MG 24 hr tablet   TAKE 1 TABLET BY MOUTH DAILY     • miconazole (MICOTIN) 2 % cream   APPLY TO THE AFFECTED AREA TWICE DAILY     • miconazole (MICOTIN) 2 % cream Apply  topically to the appropriate area as directed 2 (Two) Times a Day. to affected area     • Multiple Vitamin (ONE-A-DAY ESSENTIAL) tablet Take  by mouth.     • pantoprazole (PROTONIX) 40 MG EC tablet Take 1 tablet by mouth Daily. 90 tablet 3   • quinapril (ACCUPRIL) 40 MG tablet TAKE 1 TABLET TWICE A  tablet 3   • spironolactone (Aldactone) 25 MG tablet Take 1 tablet by mouth 2 (Two) Times a Day for 90 days. 180 tablet 3     No current facility-administered medications on file prior to visit.          When did you start taking these medications: A MONTH AGO    Which medication are you concerned about:   spironolactone (Aldactone) 25 MG tablet  25 mg, 2 Times Daily     METOPROLOL SUCCINATE XL 50 MG ONCE A DAY    Who prescribed you this medication: DR. PUENTE     What are your concerns: SHE IS WANTING TO KNOW IF SHE SHOULD BE ON THESE FOR THE FORESEEABLE FUTURE AND IF SHE SHOULD CONTINUE REFILLING THEM      How long have you had these concerns: N/A

## 2022-09-15 ENCOUNTER — TELEPHONE (OUTPATIENT)
Dept: FAMILY MEDICINE CLINIC | Facility: CLINIC | Age: 77
End: 2022-09-15

## 2022-09-15 NOTE — TELEPHONE ENCOUNTER
"Caller: Maylin Garcia    Relationship to patient: Self    Best call back number: [phone]    Patient is needing:     PATIENT CALLED, CRYING AND UPSET AND SAID SHE \"CAN'T THINK STRAIGHT\"    SHE SAID SHE'S BEEN HAVING DIFFICULTY HEARING AND THINKS HER EARS NEED TO BE CLEANED    SHE SAID THAT SHE ALSO CAN'T THINK STRAIGHT AND IS HAVING TROUBLE THINKING AT ALL, SHE SAID THAT STARTED TODAY AND SHE SAID SHE'S HAD IT BEFORE AND IS WANTING TO SEE IF DR. PUENTE CAN WORK HER IN FOR AN EAR CLEANING AND TO DISCUSS HER BRAIN FOG     SHE IS HOPING SHE CAN GET IN WITH DR. PUENTE TOMORROW IF POSSIBLE   "

## 2022-09-15 NOTE — TELEPHONE ENCOUNTER
Caller: Maylin Garcia    Relationship to patient: Self    Best call back number: [phone]    Patient is needing: PATIENT CALLED AND SAID SHE HAS AN APPOINTMENT AT THE [facility] FOR TODAY

## 2022-09-18 RX ORDER — AZITHROMYCIN 500 MG/1
500 TABLET, FILM COATED ORAL DAILY
Qty: 5 TABLET | Refills: 0 | Status: SHIPPED | OUTPATIENT
Start: 2022-09-18 | End: 2022-09-23

## 2022-09-18 RX ORDER — PREDNISONE 10 MG/1
TABLET ORAL
Qty: 21 TABLET | Refills: 0 | Status: SHIPPED | OUTPATIENT
Start: 2022-09-18 | End: 2022-09-28

## 2022-09-20 NOTE — TELEPHONE ENCOUNTER
Spoke with patient and scheduled an appt with PMJ on 11/3/2022 at 4pm.  Patient couldn't do what was available in October.

## 2022-09-26 ENCOUNTER — TELEPHONE (OUTPATIENT)
Dept: FAMILY MEDICINE CLINIC | Facility: CLINIC | Age: 77
End: 2022-09-26

## 2022-09-26 NOTE — TELEPHONE ENCOUNTER
Left detailed message on patient's voice mail at 1:57pm.    HUB TO SHARE    Yes get flu shot.  Wait on covid shot.  If they want the flu shot here, then call back to schedule for our flu shot clinic on evening of 10/5.

## 2022-09-26 NOTE — TELEPHONE ENCOUNTER
Caller: Maylin Garcia    Relationship: Self    Best call back number: [phone]       What is the best time to reach you: ANYTIME     Who are you requesting to speak with (clinical staff, provider,  specific staff member): CLINICAL STAFF     What was the call regarding: PATIENT IS WANTING TO KNOW IF IT TIME FOR HER TO GET THE 4TH COVID BOOSTER OR DOES SHE NEED TO WAIT AND GET THE VARIANT COVID BOOSTER? PATIENT WOULD ALSO LIKE TO KNOW IF IT TIME TO GET HER FLU VACCINE AS WELL.     PLEAS CALL AND ADVISE     Do you require a callback: YES

## 2022-09-26 NOTE — TELEPHONE ENCOUNTER
PATIENT RETURNED MUSA'S MISSED CALL.    HUB SHARED HUB TO READ MESSAGE.     PATIENT VERBALIZED UNDERSTANDING.

## 2022-09-28 ENCOUNTER — TELEPHONE (OUTPATIENT)
Dept: FAMILY MEDICINE CLINIC | Facility: CLINIC | Age: 77
End: 2022-09-28

## 2022-09-28 NOTE — TELEPHONE ENCOUNTER
Patient has called back and is asking WHY she was told:    Yes get flu shot    Wait on covid  shot.      She said her granddaughter is in the hospital with pneumonia and they have been in and out of the hospital a lot helping their daughter out and are afraid they might catch Covid.  She is very concerned about this and wants to know why she was told to wait on Covid vaccine.  And when she does finally get the Covid vaccine, do they get the variant?

## 2022-09-28 NOTE — TELEPHONE ENCOUNTER
He had wanted her to wait until the latest vaccine for covid was out and now it is she can get both the flu and covid shot whenever she has time.

## 2022-09-30 ENCOUNTER — PATIENT MESSAGE (OUTPATIENT)
Dept: FAMILY MEDICINE CLINIC | Facility: CLINIC | Age: 77
End: 2022-09-30

## 2022-10-03 DIAGNOSIS — R53.81 PHYSICAL DECONDITIONING: Primary | ICD-10-CM

## 2022-10-07 ENCOUNTER — TELEPHONE (OUTPATIENT)
Dept: FAMILY MEDICINE CLINIC | Facility: CLINIC | Age: 77
End: 2022-10-07

## 2022-10-07 NOTE — TELEPHONE ENCOUNTER
Caller: Maylin Garcia    Relationship: Self    Best call back number: [phone]    What orders are you requesting (i.e. lab or imaging): LABS TO CHECK KIDNEY FUNCTION    In what timeframe would the patient need to come in: BEFORE NEXT APPT ON 11/3    Where will you receive your lab/imaging services: IN OFFICE    Additional notes: PLEASE ADVISE IF PATIENT CAN GET THESE DONE BEFORE HER NEXT APPOINTMENT.

## 2022-10-08 DIAGNOSIS — E11.69 TYPE 2 DIABETES MELLITUS WITH OTHER SPECIFIED COMPLICATION, WITHOUT LONG-TERM CURRENT USE OF INSULIN: ICD-10-CM

## 2022-10-08 DIAGNOSIS — D50.8 IRON DEFICIENCY ANEMIA SECONDARY TO INADEQUATE DIETARY IRON INTAKE: Primary | ICD-10-CM

## 2022-10-08 DIAGNOSIS — I10 PRIMARY HYPERTENSION: ICD-10-CM

## 2022-10-10 ENCOUNTER — TELEPHONE (OUTPATIENT)
Dept: FAMILY MEDICINE CLINIC | Facility: CLINIC | Age: 77
End: 2022-10-10

## 2022-10-10 NOTE — TELEPHONE ENCOUNTER
Caller: Maylin Garcia    Relationship: Self    Best call back number: [phone]    What is the best time to reach you:ANYTIME    Who are you requesting to speak with (clinical staff, provider,  specific staff member): SONY    What was the call regarding: LABS    Do you require a callback: YES

## 2022-10-10 NOTE — TELEPHONE ENCOUNTER
Caller: Maylin Garcia    Relationship: Self    Best call back number:     What is the best time to reach you:     Who are you requesting to speak with (clinical staff, provider,  specific staff member): SONY    Do you know the name of the person who called:     What was the call regarding: PATIENT IS HAVING A LAB ON 10/27/22 AT 10AM AND SHE WANTS TO KNOW WHAT WILL BE CHECKED DURING THIS LAB.  SHE WANTS TO MAKE SURE THAT HER KIDNEY FUNCTION AND HER M PROTEIN IS CHECKED ONCE THIS IS DONE.     Do you require a callback: NO

## 2022-10-10 NOTE — TELEPHONE ENCOUNTER
Spoke with patient and scheduled a [facility] lab appt on 10/27/2022 at 10am.

## 2022-10-11 DIAGNOSIS — D47.2 MGUS (MONOCLONAL GAMMOPATHY OF UNKNOWN SIGNIFICANCE): Primary | ICD-10-CM

## 2022-10-11 NOTE — TELEPHONE ENCOUNTER
"Gave message to patient at 10:26am.  She still wants the \"M Protein\" done.  She said it needs to be checked every 3 months.  Please add the order.  "

## 2022-10-20 RX ORDER — AZITHROMYCIN 500 MG/1
500 TABLET, FILM COATED ORAL DAILY
Qty: 5 TABLET | Refills: 0 | Status: SHIPPED | OUTPATIENT
Start: 2022-10-20 | End: 2022-10-25

## 2022-10-23 DIAGNOSIS — D47.2 MGUS (MONOCLONAL GAMMOPATHY OF UNKNOWN SIGNIFICANCE): ICD-10-CM

## 2022-10-23 DIAGNOSIS — M35.9 CONNECTIVE TISSUE DISEASE, UNDIFFERENTIATED: Primary | ICD-10-CM

## 2022-10-27 ENCOUNTER — LAB (OUTPATIENT)
Dept: FAMILY MEDICINE CLINIC | Facility: CLINIC | Age: 77
End: 2022-10-27

## 2022-10-27 DIAGNOSIS — I10 PRIMARY HYPERTENSION: ICD-10-CM

## 2022-10-27 DIAGNOSIS — D50.8 IRON DEFICIENCY ANEMIA SECONDARY TO INADEQUATE DIETARY IRON INTAKE: ICD-10-CM

## 2022-10-27 DIAGNOSIS — D47.2 MGUS (MONOCLONAL GAMMOPATHY OF UNKNOWN SIGNIFICANCE): ICD-10-CM

## 2022-10-27 DIAGNOSIS — E11.69 TYPE 2 DIABETES MELLITUS WITH OTHER SPECIFIED COMPLICATION, WITHOUT LONG-TERM CURRENT USE OF INSULIN: ICD-10-CM

## 2022-10-27 DIAGNOSIS — M35.9 CONNECTIVE TISSUE DISEASE, UNDIFFERENTIATED: ICD-10-CM

## 2022-10-27 LAB
ALBUMIN SERPL-MCNC: 4.8 G/DL (ref 3.5–5.2)
ALBUMIN/GLOB SERPL: 2.4 G/DL
ALP SERPL-CCNC: 59 U/L (ref 39–117)
ALT SERPL W P-5'-P-CCNC: 29 U/L (ref 1–33)
ANION GAP SERPL CALCULATED.3IONS-SCNC: 10 MMOL/L (ref 5–15)
AST SERPL-CCNC: 20 U/L (ref 1–32)
BASOPHILS # BLD AUTO: 0.04 10*3/MM3 (ref 0–0.2)
BASOPHILS NFR BLD AUTO: 0.7 % (ref 0–1.5)
BILIRUB SERPL-MCNC: 0.4 MG/DL (ref 0–1.2)
BUN SERPL-MCNC: 33 MG/DL (ref 8–23)
BUN/CREAT SERPL: 25 (ref 7–25)
CALCIUM SPEC-SCNC: 10.2 MG/DL (ref 8.6–10.5)
CHLORIDE SERPL-SCNC: 106 MMOL/L (ref 98–107)
CHROMATIN AB SERPL-ACNC: <10 IU/ML (ref 0–14)
CO2 SERPL-SCNC: 25 MMOL/L (ref 22–29)
CREAT SERPL-MCNC: 1.32 MG/DL (ref 0.57–1)
CRP SERPL-MCNC: <0.3 MG/DL (ref 0–0.5)
DEPRECATED RDW RBC AUTO: 47.6 FL (ref 37–54)
EGFRCR SERPLBLD CKD-EPI 2021: 42.5 ML/MIN/1.73
EOSINOPHIL # BLD AUTO: 0.09 10*3/MM3 (ref 0–0.4)
EOSINOPHIL NFR BLD AUTO: 1.7 % (ref 0.3–6.2)
ERYTHROCYTE [DISTWIDTH] IN BLOOD BY AUTOMATED COUNT: 12.6 % (ref 12.3–15.4)
ERYTHROCYTE [SEDIMENTATION RATE] IN BLOOD: 7 MM/HR (ref 0–30)
GLOBULIN UR ELPH-MCNC: 2 GM/DL
GLUCOSE SERPL-MCNC: 110 MG/DL (ref 65–99)
HBA1C MFR BLD: 5.4 % (ref 3.5–5.6)
HCT VFR BLD AUTO: 38.5 % (ref 34–46.6)
HGB BLD-MCNC: 12.7 G/DL (ref 12–15.9)
IMM GRANULOCYTES # BLD AUTO: 0.03 10*3/MM3 (ref 0–0.05)
IMM GRANULOCYTES NFR BLD AUTO: 0.6 % (ref 0–0.5)
LYMPHOCYTES # BLD AUTO: 1.49 10*3/MM3 (ref 0.7–3.1)
LYMPHOCYTES NFR BLD AUTO: 27.8 % (ref 19.6–45.3)
MCH RBC QN AUTO: 33.3 PG (ref 26.6–33)
MCHC RBC AUTO-ENTMCNC: 33 G/DL (ref 31.5–35.7)
MCV RBC AUTO: 101 FL (ref 79–97)
MONOCYTES # BLD AUTO: 0.51 10*3/MM3 (ref 0.1–0.9)
MONOCYTES NFR BLD AUTO: 9.5 % (ref 5–12)
NEUTROPHILS NFR BLD AUTO: 3.2 10*3/MM3 (ref 1.7–7)
NEUTROPHILS NFR BLD AUTO: 59.7 % (ref 42.7–76)
NRBC BLD AUTO-RTO: 0 /100 WBC (ref 0–0.2)
PLATELET # BLD AUTO: 165 10*3/MM3 (ref 140–450)
PMV BLD AUTO: 10.8 FL (ref 6–12)
POTASSIUM SERPL-SCNC: 5.5 MMOL/L (ref 3.5–5.2)
PROT SERPL-MCNC: 6.8 G/DL (ref 6–8.5)
RBC # BLD AUTO: 3.81 10*6/MM3 (ref 3.77–5.28)
SODIUM SERPL-SCNC: 141 MMOL/L (ref 136–145)
WBC NRBC COR # BLD: 5.36 10*3/MM3 (ref 3.4–10.8)

## 2022-10-27 PROCEDURE — 81003 URINALYSIS AUTO W/O SCOPE: CPT | Performed by: FAMILY MEDICINE

## 2022-10-27 PROCEDURE — 84165 PROTEIN E-PHORESIS SERUM: CPT | Performed by: FAMILY MEDICINE

## 2022-10-27 PROCEDURE — 86140 C-REACTIVE PROTEIN: CPT | Performed by: FAMILY MEDICINE

## 2022-10-27 PROCEDURE — 85652 RBC SED RATE AUTOMATED: CPT | Performed by: FAMILY MEDICINE

## 2022-10-27 PROCEDURE — 85025 COMPLETE CBC W/AUTO DIFF WBC: CPT | Performed by: FAMILY MEDICINE

## 2022-10-27 PROCEDURE — 83036 HEMOGLOBIN GLYCOSYLATED A1C: CPT | Performed by: FAMILY MEDICINE

## 2022-10-27 PROCEDURE — 80053 COMPREHEN METABOLIC PANEL: CPT | Performed by: FAMILY MEDICINE

## 2022-10-27 PROCEDURE — 36415 COLL VENOUS BLD VENIPUNCTURE: CPT | Performed by: FAMILY MEDICINE

## 2022-10-27 PROCEDURE — 86038 ANTINUCLEAR ANTIBODIES: CPT | Performed by: FAMILY MEDICINE

## 2022-10-27 PROCEDURE — 86431 RHEUMATOID FACTOR QUANT: CPT | Performed by: FAMILY MEDICINE

## 2022-10-27 PROCEDURE — 82787 IGG 1 2 3 OR 4 EACH: CPT | Performed by: FAMILY MEDICINE

## 2022-10-28 DIAGNOSIS — N17.9 ACUTE RENAL FAILURE, UNSPECIFIED ACUTE RENAL FAILURE TYPE: Primary | ICD-10-CM

## 2022-10-28 LAB
ALBUMIN SERPL ELPH-MCNC: 4.1 G/DL (ref 2.9–4.4)
ALBUMIN/GLOB SERPL: 1.5 {RATIO} (ref 0.7–1.7)
ALPHA1 GLOB SERPL ELPH-MCNC: 0.2 G/DL (ref 0–0.4)
ALPHA2 GLOB SERPL ELPH-MCNC: 0.8 G/DL (ref 0.4–1)
ANA SER QL: NEGATIVE
B-GLOBULIN SERPL ELPH-MCNC: 0.9 G/DL (ref 0.7–1.3)
BILIRUB UR QL STRIP: NEGATIVE
CLARITY UR: CLEAR
COLOR UR: YELLOW
GAMMA GLOB SERPL ELPH-MCNC: 0.7 G/DL (ref 0.4–1.8)
GLOBULIN SER CALC-MCNC: 2.7 G/DL (ref 2.2–3.9)
GLUCOSE UR STRIP-MCNC: NEGATIVE MG/DL
HGB UR QL STRIP.AUTO: NEGATIVE
IGG4 SER-MCNC: 19 MG/DL (ref 2–96)
KETONES UR QL STRIP: NEGATIVE
LABORATORY COMMENT REPORT: ABNORMAL
LEUKOCYTE ESTERASE UR QL STRIP.AUTO: NEGATIVE
M PROTEIN SERPL ELPH-MCNC: 0.4 G/DL
NITRITE UR QL STRIP: NEGATIVE
PH UR STRIP.AUTO: 6 [PH] (ref 5–8)
PROT PATTERN SERPL ELPH-IMP: ABNORMAL
PROT SERPL-MCNC: 6.8 G/DL (ref 6–8.5)
PROT UR QL STRIP: NEGATIVE
SP GR UR STRIP: 1.01 (ref 1–1.03)
UROBILINOGEN UR QL STRIP: NORMAL

## 2022-10-28 NOTE — PROGRESS NOTES
Komal call Maylin are better yet Yolis her daughter and tell them that Maylin's kidney function is not as good as it has been.  She needs to drink more fluids and I want her to repeat a B in P in 1 week.  If the kidney function has not improved I am going to ask her to see a kidney specialist to see if there is anything reversible we can do.  I also want her to do an ultrasound in the next week just took measure kidney size and make sure there is no obstruction.  It is very simple to do.  Sometimes we see these changes just simply because she is not drinking enough fluid.  Ask her to drink more fluids this week so we can flush the kidneys and maybe turn this around.  Reassure them that this is nothing dangerous right now.  It is just something we found on test.

## 2022-10-29 ENCOUNTER — HOSPITAL ENCOUNTER (OUTPATIENT)
Dept: ULTRASOUND IMAGING | Facility: HOSPITAL | Age: 77
Discharge: HOME | End: 2022-10-29
Admitting: FAMILY MEDICINE

## 2022-10-29 DIAGNOSIS — N17.9 ACUTE RENAL FAILURE, UNSPECIFIED ACUTE RENAL FAILURE TYPE: ICD-10-CM

## 2022-10-29 PROCEDURE — 76775 US EXAM ABDO BACK WALL LIM: CPT

## 2022-11-03 ENCOUNTER — OFFICE VISIT (OUTPATIENT)
Dept: FAMILY MEDICINE CLINIC | Facility: CLINIC | Age: 77
End: 2022-11-03

## 2022-11-03 VITALS — DIASTOLIC BLOOD PRESSURE: 54 MMHG | HEART RATE: 62 BPM | OXYGEN SATURATION: 96 % | SYSTOLIC BLOOD PRESSURE: 112 MMHG

## 2022-11-03 DIAGNOSIS — F41.8 SITUATIONAL ANXIETY: ICD-10-CM

## 2022-11-03 DIAGNOSIS — N17.9 ACUTE RENAL INJURY: Primary | ICD-10-CM

## 2022-11-03 DIAGNOSIS — E11.9 TYPE 2 DIABETES MELLITUS WITHOUT COMPLICATION, WITHOUT LONG-TERM CURRENT USE OF INSULIN: ICD-10-CM

## 2022-11-03 PROBLEM — G98.8 NEUROLOGICAL DISORDER: Status: ACTIVE | Noted: 2022-11-03

## 2022-11-03 PROBLEM — R01.0 FUNCTIONAL HEART MURMUR: Status: ACTIVE | Noted: 2022-11-03

## 2022-11-03 PROCEDURE — 99213 OFFICE O/P EST LOW 20 MIN: CPT | Performed by: FAMILY MEDICINE

## 2022-11-03 RX ORDER — DIAZEPAM 10 MG/1
TABLET ORAL
Qty: 3 TABLET | Refills: 0 | Status: SHIPPED | OUTPATIENT
Start: 2022-11-03 | End: 2022-11-03

## 2022-11-03 RX ORDER — DIAZEPAM 10 MG/1
TABLET ORAL
Qty: 3 TABLET | Refills: 0 | Status: SHIPPED | OUTPATIENT
Start: 2022-11-03 | End: 2023-03-14 | Stop reason: SDUPTHER

## 2022-11-03 RX ORDER — ZOLPIDEM TARTRATE 5 MG/1
5 TABLET ORAL NIGHTLY PRN
Qty: 30 TABLET | Refills: 1 | Status: SHIPPED | OUTPATIENT
Start: 2022-11-03 | End: 2022-12-11

## 2022-11-03 NOTE — PROGRESS NOTES
"Chief Complaint  brain fog    Subjective      Maylin Garcia presents to [facility]  History of Present Illness  For brain fog. Review labs, review medication.  Patient in hospital in June and Aug.  Exposed to IV contrast several times.  Now showing signs of  Acute renal injury.  The patient presents today for a follow-up.    The patient states that she started physical therapy on 11/01/2022. She states that she is currently working on her strength and balance. 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 The patient adds that her back pain is more of a stiffness than pain. She states that the pain does not radiate down her legs. She notes that she has been drinking a lot of fluids. She states that she was in the hospital in 06/2022 and 08/2022. She states that she was given IV dye a couple of times. She states that her blood pressure is very low. She states that she is taking amlodipine, atorvastatin, fluoxetine, and glimepiride. She states that her blood glucose are doing well. She states that her hemoglobin A1c was 5.4 percent. She states that she is taking metformin 4 times a day and 1 glimepiride. She states that she is taking pantoprazole, and metoprolol. She states that she is not taking losartan anymore. The patient adds that her heartbeat has been low. She states that she does not feel lightheaded or dizzy. She states that she is sleeping during the day and up at night. She states that she is always tired. She states that she tries to go to bed around 4:00 AM or 5:00 AM. She states that she is up for 4 to 5 hours.    Objective   Vital Signs:  /54 (BP Location: Right arm, Cuff Size: Large Adult)   Pulse 62   SpO2 96%   Estimated body mass index is 33.98 kg/m² as calculated from the following:    Height as of 6/5/22: 167.6 cm (66\").    Weight as of 6/5/22: 95.5 kg (210 lb 8.6 oz).    BMI is >= 30 and <35. (Class 1 Obesity). The following options were offered after " discussion;: weight loss educational material (shared in after visit summary), exercise counseling/recommendations and nutrition counseling/recommendations      Physical Exam  Constitutional:       Appearance: Normal appearance. She is well-developed and normal weight.   HENT:      Head: Normocephalic and atraumatic.      Right Ear: Tympanic membrane, ear canal and external ear normal.      Left Ear: Tympanic membrane, ear canal and external ear normal.      Nose: Nose normal.      Mouth/Throat:      Mouth: Mucous membranes are moist.      Pharynx: Oropharynx is clear. No oropharyngeal exudate.   Eyes:      Extraocular Movements: Extraocular movements intact.      Conjunctiva/sclera: Conjunctivae normal.      Pupils: Pupils are equal, round, and reactive to light.   Cardiovascular:      Rate and Rhythm: Normal rate and regular rhythm.      Pulses: Normal pulses.      Heart sounds: Normal heart sounds.   Pulmonary:      Effort: Pulmonary effort is normal.      Breath sounds: Normal breath sounds.   Abdominal:      General: Bowel sounds are normal.      Palpations: Abdomen is soft.   Musculoskeletal:         General: Normal range of motion.      Cervical back: Normal range of motion and neck supple.   Skin:     General: Skin is warm and dry.   Neurological:      General: No focal deficit present.      Mental Status: She is alert and oriented to person, place, and time. Mental status is at baseline.   Psychiatric:         Mood and Affect: Mood normal.         Behavior: Behavior normal.         Thought Content: Thought content normal.         Judgment: Judgment normal.        Assessment and Plan     Acute renal injury  Maylin is here today to follow up on a recent lab test that showed her kidney function had deteriorated with a glomerular filtration rate in the low 50s. She had previously had normal glomerular filtration rate and was hospitalized several times over the past 4 months. During those hospital stays, she was  imaged using contrast at least once or twice and hydration varied from visit to visit. I think somewhere along the line, she probably had some insult to the kidney that resulted in these numbers. Since being home, she is hydrated much better and her last reading was showing some improvement, but she is here today to repeat that lab value and also to discuss possible causes. Her blood pressure is actually excellent today. It is taking four medicines to keep it there, but her blood pressure is still very good. We will check electrolytes, kidney function, liver function tests, and her blood sugars. She has done a great job with her sugars. Her last hemoglobin A1c was in the mid to high 5 percent range, which is excellent. Overall, she is doing okay. She is having a lot of back pain, but she is not taking a lot of non-steroidal anti-inflammatory drugs and she is not taking a lot of analgesics of any sort. We will have to see how we do with the blood test to make any further decisions about treatment or evaluation. I am hoping that it just returns to normal and we do not need to do any further evaluation.    Situational anxiety  Patient is under a lot of stress both with her personal health and also some family situations that are creating stress. Nonetheless, she has a very supportive  and family and she is doing better. We will see how this goes, but she is taking medications appropriately and hopefully some of these situations will straighten out over the next few months.    Type 2 diabetes without major complications  The patient's last hemoglobin A1c was in the 5.5 percent range. We will see what it is today and make adjustments in her medicines, although I doubt if it is going to be necessary.    I spent 25 minutes caring for Maylin on this date of service. This time includes time spent by me in the following activities:preparing for the visit, reviewing tests, obtaining and/or reviewing a separately obtained  history, performing a medically appropriate examination and/or evaluation , counseling and educating the patient/family/caregiver, ordering medications, tests, or procedures, referring and communicating with other health care professionals , documenting information in the medical record, independently interpreting results and communicating that information with the patient/family/caregiver and care coordination     Follow Up   Return in about 6 months (around 5/3/2023), or if symptoms worsen or fail to improve, for Recheck.  Patient was given instructions and counseling regarding her condition or for health maintenance advice. Please see specific information pulled into the AVS if appropriate.     Transcribed from ambient dictation for Abiodun Busch MD by Carmelina Beavers.  11/03/22   17:27 EDT    Patient or patient representative verbalized consent to the visit recording.  I have personally performed the services described in this document as transcribed by the above individual, and it is both accurate and complete.  Abiodun Busch MD  11/6/2022  12:39 EST  Carmelina Beavers

## 2022-11-04 ENCOUNTER — TELEPHONE (OUTPATIENT)
Dept: FAMILY MEDICINE CLINIC | Facility: CLINIC | Age: 77
End: 2022-11-04

## 2022-11-04 NOTE — TELEPHONE ENCOUNTER
Spoke with patient and rescheduled [facility] for [facility]/2022 at 10:20am and 10:30am.

## 2022-11-04 NOTE — TELEPHONE ENCOUNTER
PATIENT AND HER  TESSY VELASQUEZ WERE SCHEDULED FOR FASTING LABS TODAY 11/04 AROUND 10 AM. HOWEVER THEY FORGOT TO FAST AND NEED TO RESCHEDULE FOR Tuesday 11/08 AT 10 AM IF POSSIBLE. HOWEVER I COULD NOT WARM TRANSFER SUCCESSFULLY. PLEASE CALL PATIENT BACK TO RESCHEDULE.     PATIENT> [phone]

## 2022-11-08 ENCOUNTER — LAB (OUTPATIENT)
Dept: LAB | Facility: HOSPITAL | Age: 77
End: 2022-11-08

## 2022-11-08 DIAGNOSIS — N17.9 ACUTE RENAL FAILURE, UNSPECIFIED ACUTE RENAL FAILURE TYPE: ICD-10-CM

## 2022-11-08 LAB
ALBUMIN SERPL-MCNC: 4.4 G/DL (ref 3.5–5.2)
ALBUMIN/GLOB SERPL: 1.6 G/DL
ALP SERPL-CCNC: 58 U/L (ref 39–117)
ALT SERPL W P-5'-P-CCNC: 21 U/L (ref 1–33)
ANION GAP SERPL CALCULATED.3IONS-SCNC: 8.8 MMOL/L (ref 5–15)
AST SERPL-CCNC: 17 U/L (ref 1–32)
BACTERIA UR QL AUTO: NORMAL /HPF
BASOPHILS # BLD AUTO: 0.04 10*3/MM3 (ref 0–0.2)
BASOPHILS NFR BLD AUTO: 0.5 % (ref 0–1.5)
BILIRUB SERPL-MCNC: 0.6 MG/DL (ref 0–1.2)
BILIRUB UR QL STRIP: NEGATIVE
BUN SERPL-MCNC: 31 MG/DL (ref 8–23)
BUN/CREAT SERPL: 26.5 (ref 7–25)
CALCIUM SPEC-SCNC: 10.1 MG/DL (ref 8.6–10.5)
CHLORIDE SERPL-SCNC: 103 MMOL/L (ref 98–107)
CLARITY UR: CLEAR
CO2 SERPL-SCNC: 25.2 MMOL/L (ref 22–29)
COLOR UR: YELLOW
CREAT SERPL-MCNC: 1.17 MG/DL (ref 0.57–1)
DEPRECATED RDW RBC AUTO: 43.2 FL (ref 37–54)
EGFRCR SERPLBLD CKD-EPI 2021: 49.1 ML/MIN/1.73
EOSINOPHIL # BLD AUTO: 0.11 10*3/MM3 (ref 0–0.4)
EOSINOPHIL NFR BLD AUTO: 1.5 % (ref 0.3–6.2)
ERYTHROCYTE [DISTWIDTH] IN BLOOD BY AUTOMATED COUNT: 11.8 % (ref 12.3–15.4)
GLOBULIN UR ELPH-MCNC: 2.8 GM/DL
GLUCOSE SERPL-MCNC: 92 MG/DL (ref 65–99)
GLUCOSE UR STRIP-MCNC: NEGATIVE MG/DL
HBA1C MFR BLD: 5.3 % (ref 3.5–5.6)
HCT VFR BLD AUTO: 36.3 % (ref 34–46.6)
HGB BLD-MCNC: 12.1 G/DL (ref 12–15.9)
HGB UR QL STRIP.AUTO: NEGATIVE
HYALINE CASTS UR QL AUTO: NORMAL /LPF
IMM GRANULOCYTES # BLD AUTO: 0.03 10*3/MM3 (ref 0–0.05)
IMM GRANULOCYTES NFR BLD AUTO: 0.4 % (ref 0–0.5)
KETONES UR QL STRIP: NEGATIVE
LEUKOCYTE ESTERASE UR QL STRIP.AUTO: ABNORMAL
LYMPHOCYTES # BLD AUTO: 1.47 10*3/MM3 (ref 0.7–3.1)
LYMPHOCYTES NFR BLD AUTO: 19.5 % (ref 19.6–45.3)
MCH RBC QN AUTO: 33.3 PG (ref 26.6–33)
MCHC RBC AUTO-ENTMCNC: 33.3 G/DL (ref 31.5–35.7)
MCV RBC AUTO: 100 FL (ref 79–97)
MONOCYTES # BLD AUTO: 0.58 10*3/MM3 (ref 0.1–0.9)
MONOCYTES NFR BLD AUTO: 7.7 % (ref 5–12)
NEUTROPHILS NFR BLD AUTO: 5.32 10*3/MM3 (ref 1.7–7)
NEUTROPHILS NFR BLD AUTO: 70.4 % (ref 42.7–76)
NITRITE UR QL STRIP: NEGATIVE
NRBC BLD AUTO-RTO: 0 /100 WBC (ref 0–0.2)
PH UR STRIP.AUTO: 5.5 [PH] (ref 5–8)
PLATELET # BLD AUTO: 206 10*3/MM3 (ref 140–450)
PMV BLD AUTO: 10.4 FL (ref 6–12)
POTASSIUM SERPL-SCNC: 5.9 MMOL/L (ref 3.5–5.2)
PROT SERPL-MCNC: 7.2 G/DL (ref 6–8.5)
PROT UR QL STRIP: NEGATIVE
RBC # BLD AUTO: 3.63 10*6/MM3 (ref 3.77–5.28)
RBC # UR STRIP: NORMAL /HPF
REF LAB TEST METHOD: NORMAL
SODIUM SERPL-SCNC: 137 MMOL/L (ref 136–145)
SP GR UR STRIP: 1.02 (ref 1–1.03)
SQUAMOUS #/AREA URNS HPF: NORMAL /HPF
UROBILINOGEN UR QL STRIP: ABNORMAL
WBC # UR STRIP: NORMAL /HPF
WBC NRBC COR # BLD: 7.55 10*3/MM3 (ref 3.4–10.8)

## 2022-11-08 PROCEDURE — 85025 COMPLETE CBC W/AUTO DIFF WBC: CPT | Performed by: FAMILY MEDICINE

## 2022-11-08 PROCEDURE — 80053 COMPREHEN METABOLIC PANEL: CPT | Performed by: FAMILY MEDICINE

## 2022-11-08 PROCEDURE — 36415 COLL VENOUS BLD VENIPUNCTURE: CPT | Performed by: FAMILY MEDICINE

## 2022-11-08 PROCEDURE — 83036 HEMOGLOBIN GLYCOSYLATED A1C: CPT | Performed by: FAMILY MEDICINE

## 2022-11-08 PROCEDURE — 81001 URINALYSIS AUTO W/SCOPE: CPT | Performed by: FAMILY MEDICINE

## 2022-11-08 NOTE — PROGRESS NOTES
Komal tell Maylin to cut her spironolactone dose from 25 to 12.5 mg twice a day.  She will have to break those tiny pills in half and do the best she can with that.  I need her to repeat her basic metabolic panel in 1 week.  Her potassium is too high and I need to make sure it is coming down.

## 2022-11-09 ENCOUNTER — TELEPHONE (OUTPATIENT)
Dept: FAMILY MEDICINE CLINIC | Facility: CLINIC | Age: 77
End: 2022-11-09

## 2022-11-09 DIAGNOSIS — N17.9 ACUTE RENAL INJURY: Primary | ICD-10-CM

## 2022-11-09 NOTE — TELEPHONE ENCOUNTER
Caller: Maylin Garcia    Relationship: Self    Best call back number: [phone]    What was the call regarding: PATIENT STATES THAT SHE SPOKE WITH AKSHAT ABOUT SCHEDULING UPCOMING LABS. REQUESTS A CALL BACK TO CONFIRM IF THOSE NEED TO BE FOR FASTING LABS    Do you require a callback: YES

## 2022-11-09 NOTE — TELEPHONE ENCOUNTER
"Tried to call patient and it went to  left  with what Dr. Busch said to do.       ----- Message from Abiodun Busch MD sent at 11/8/2022  6:54 PM EST -----    FOR [facility] TO SAY:     \"Komal tell Maylin to cut her spironolactone dose from 25 to 12.5 mg twice a day.  She will have to break those tiny pills in half and do the best she can with that.  I need her to repeat her basic metabolic panel in 1 week.  Her potassium is too high and I need to make sure it is coming down.\"    "

## 2022-11-09 NOTE — TELEPHONE ENCOUNTER
Caller: Maylin Garcia    Relationship: Self    Best call back number: [phone]    What orders are you requesting (i.e. lab or imaging): LABS    In what timeframe would the patient need to come in: NEXT WEEK    Where will you receive your lab/imaging services: IN BUILDING    Additional notes: PATIENT UPSET AS SHE DOES NOT WANT TO MAKE AN APPT TO HAVE HER LABS DRAWN NEXT WEEK.    PATIENT STATED THAT DR PUENTE INDICATED THAT SHE WOULD HAVE TO HAVE HER LABS DRAWN EVERY WEEK FOR A WHILE.      PATIENT REQUESTED TO HAVE A STANDING ORDER PLACED SO SHE DOES NOT HAVE TO CALL IN EACH WEEK. FOR LABS OR TO REQUEST THE ORDER EACH WEEK.

## 2022-11-10 DIAGNOSIS — M19.91 PRIMARY OSTEOARTHRITIS, UNSPECIFIED SITE: ICD-10-CM

## 2022-11-10 RX ORDER — HYDROCODONE BITARTRATE AND ACETAMINOPHEN 10; 325 MG/1; MG/1
TABLET ORAL
Qty: 360 TABLET | Refills: 0 | Status: SHIPPED | OUTPATIENT
Start: 2022-11-10 | End: 2022-12-15 | Stop reason: SDUPTHER

## 2022-11-11 ENCOUNTER — TELEPHONE (OUTPATIENT)
Dept: FAMILY MEDICINE CLINIC | Facility: CLINIC | Age: 77
End: 2022-11-11

## 2022-11-11 NOTE — TELEPHONE ENCOUNTER
Hub staff attempted to follow warm transfer process and was unsuccessful     Caller: Maylin Garcia    Relationship to patient: Self    Best call back number:[phone] (Home)    Patient is needing: PATIENT WAS CALLING TO SPEAK TO [facility] REGARDING A PRESCRIPTION THAT WAS SENT TO [facility] THAT WAS SUPPOSED TO BE SENT TO [facility], PATIENT STATES THAT THIS KEEPS HAPPEING OVER AND OVER AND SHE REALLY NEEDS TO SPEAK DIRECTLY TO [facility] REGARDING THIS AND DID NOT WANT TO SPEAK TO ANYONE ELSE REGARDING THIS    PLEASE CALL PATIENT BACK REGARDING THIS ASAP

## 2022-11-11 NOTE — TELEPHONE ENCOUNTER
PATIENT CALLED GILBERTO BECAUSE HER RX WAS SENT TO [facility]STEAD OF [facility]. I OFFERED TO HAVE IT MOVED BUT SHE SAID SHE WOULD JUST GO PICK IT UP.

## 2022-11-12 RX ORDER — NYSTATIN 100000 [USP'U]/G
POWDER TOPICAL 3 TIMES DAILY
Qty: 60 G | Refills: 2 | Status: SHIPPED | OUTPATIENT
Start: 2022-11-12 | End: 2022-12-12

## 2022-11-12 RX ORDER — FLUCONAZOLE 200 MG/1
200 TABLET ORAL
Qty: 5 TABLET | Refills: 1 | Status: SHIPPED | OUTPATIENT
Start: 2022-11-12 | End: 2022-12-01 | Stop reason: SDUPTHER

## 2022-11-15 ENCOUNTER — TELEPHONE (OUTPATIENT)
Dept: FAMILY MEDICINE CLINIC | Facility: CLINIC | Age: 77
End: 2022-11-15

## 2022-11-15 NOTE — TELEPHONE ENCOUNTER
----- Message from Abiodun Busch MD sent at 11/12/2022  4:16 PM EST -----  Komal make Maylin an appointment for 2 weeks.  It should be a quick rash check.  I told her to call if the rash clears up before then and we can cancel the appointment.

## 2022-11-17 ENCOUNTER — LAB (OUTPATIENT)
Dept: FAMILY MEDICINE CLINIC | Facility: CLINIC | Age: 77
End: 2022-11-17

## 2022-11-17 DIAGNOSIS — N17.9 ACUTE RENAL INJURY: ICD-10-CM

## 2022-11-17 PROCEDURE — 80048 BASIC METABOLIC PNL TOTAL CA: CPT | Performed by: FAMILY MEDICINE

## 2022-11-17 PROCEDURE — 36415 COLL VENOUS BLD VENIPUNCTURE: CPT

## 2022-11-18 LAB
ANION GAP SERPL CALCULATED.3IONS-SCNC: 8.4 MMOL/L (ref 5–15)
BUN SERPL-MCNC: 34 MG/DL (ref 8–23)
BUN/CREAT SERPL: 25.4 (ref 7–25)
CALCIUM SPEC-SCNC: 9.9 MG/DL (ref 8.6–10.5)
CHLORIDE SERPL-SCNC: 103 MMOL/L (ref 98–107)
CO2 SERPL-SCNC: 26.6 MMOL/L (ref 22–29)
CREAT SERPL-MCNC: 1.34 MG/DL (ref 0.57–1)
EGFRCR SERPLBLD CKD-EPI 2021: 41.7 ML/MIN/1.73
GLUCOSE SERPL-MCNC: 62 MG/DL (ref 65–99)
POTASSIUM SERPL-SCNC: 5.9 MMOL/L (ref 3.5–5.2)
SODIUM SERPL-SCNC: 138 MMOL/L (ref 136–145)

## 2022-11-19 RX ORDER — HYDROCHLOROTHIAZIDE 25 MG/1
25 TABLET ORAL DAILY
Qty: 90 TABLET | Refills: 2 | Status: SHIPPED | OUTPATIENT
Start: 2022-11-19 | End: 2022-12-19 | Stop reason: SDUPTHER

## 2022-11-19 NOTE — PROGRESS NOTES
Komal call Maylin and tell her to stop the spironolactone (aldactone).  It is causing her potassium to go too high.  I am going to switch her to hydrochlorothiazide 25 mg  a day and she needs to stop the Aldactone.  I sent her this message Saturday evening but I need you to check and make sure she got it.

## 2022-11-22 RX ORDER — SPIRONOLACTONE 25 MG/1
25 TABLET ORAL 2 TIMES DAILY
Qty: 180 TABLET | Refills: 1 | Status: SHIPPED | OUTPATIENT
Start: 2022-11-22 | End: 2022-11-28

## 2022-11-22 RX ORDER — METOPROLOL SUCCINATE 50 MG/1
50 TABLET, EXTENDED RELEASE ORAL DAILY
Qty: 90 TABLET | Refills: 1 | Status: SHIPPED | OUTPATIENT
Start: 2022-11-22 | End: 2022-12-19 | Stop reason: SDUPTHER

## 2022-11-28 ENCOUNTER — LAB (OUTPATIENT)
Dept: FAMILY MEDICINE CLINIC | Facility: CLINIC | Age: 77
End: 2022-11-28

## 2022-11-28 ENCOUNTER — OFFICE VISIT (OUTPATIENT)
Dept: FAMILY MEDICINE CLINIC | Facility: CLINIC | Age: 77
End: 2022-11-28

## 2022-11-28 VITALS
HEART RATE: 59 BPM | DIASTOLIC BLOOD PRESSURE: 70 MMHG | HEIGHT: 66 IN | RESPIRATION RATE: 16 BRPM | WEIGHT: 191 LBS | OXYGEN SATURATION: 99 % | TEMPERATURE: 97.3 F | SYSTOLIC BLOOD PRESSURE: 117 MMHG | BODY MASS INDEX: 30.7 KG/M2

## 2022-11-28 DIAGNOSIS — B37.2 MONILIAL INTERTRIGO: Primary | ICD-10-CM

## 2022-11-28 DIAGNOSIS — N17.9 ACUTE RENAL INJURY: ICD-10-CM

## 2022-11-28 DIAGNOSIS — E11.9 TYPE 2 DIABETES MELLITUS WITHOUT COMPLICATION, WITHOUT LONG-TERM CURRENT USE OF INSULIN: ICD-10-CM

## 2022-11-28 PROCEDURE — 36415 COLL VENOUS BLD VENIPUNCTURE: CPT

## 2022-11-28 PROCEDURE — 99213 OFFICE O/P EST LOW 20 MIN: CPT | Performed by: FAMILY MEDICINE

## 2022-11-28 PROCEDURE — 80048 BASIC METABOLIC PNL TOTAL CA: CPT | Performed by: FAMILY MEDICINE

## 2022-11-29 LAB
ANION GAP SERPL CALCULATED.3IONS-SCNC: 8.6 MMOL/L (ref 5–15)
BUN SERPL-MCNC: 36 MG/DL (ref 8–23)
BUN/CREAT SERPL: 26.9 (ref 7–25)
CALCIUM SPEC-SCNC: 9.8 MG/DL (ref 8.6–10.5)
CHLORIDE SERPL-SCNC: 100 MMOL/L (ref 98–107)
CO2 SERPL-SCNC: 28.4 MMOL/L (ref 22–29)
CREAT SERPL-MCNC: 1.34 MG/DL (ref 0.57–1)
EGFRCR SERPLBLD CKD-EPI 2021: 41.4 ML/MIN/1.73
GLUCOSE SERPL-MCNC: 70 MG/DL (ref 65–99)
POTASSIUM SERPL-SCNC: 5.4 MMOL/L (ref 3.5–5.2)
SODIUM SERPL-SCNC: 137 MMOL/L (ref 136–145)

## 2022-12-01 ENCOUNTER — TELEPHONE (OUTPATIENT)
Dept: FAMILY MEDICINE CLINIC | Facility: CLINIC | Age: 77
End: 2022-12-01

## 2022-12-01 RX ORDER — FLUCONAZOLE 200 MG/1
200 TABLET ORAL
Qty: 5 TABLET | Refills: 1 | Status: SHIPPED | OUTPATIENT
Start: 2022-12-01 | End: 2022-12-10

## 2022-12-01 NOTE — TELEPHONE ENCOUNTER
Fluconazole has been sent in for 5 doses  
Macrobid was sent in.  Do you want Diflucan?   
PATIENT CALLED SAYING WE WERE SUPPOSED TO SEND IN FLUCONAZOLE YESTERDAY AND DID NOT.   DUNIA STALEY   
Statement Selected

## 2022-12-06 ENCOUNTER — TELEPHONE (OUTPATIENT)
Dept: FAMILY MEDICINE CLINIC | Facility: CLINIC | Age: 77
End: 2022-12-06

## 2022-12-06 NOTE — TELEPHONE ENCOUNTER
We didn't fill it ,  It is probably on auto refill from aug refill   That would be about 90 days    But I do not know if she is suppose to be on it or not   So I can not make that call     She needs to stop auto refills at pharmacy to stop that

## 2022-12-06 NOTE — TELEPHONE ENCOUNTER
Patient just got another call from José Luis at  that her Clonidine rx was ready to .  Patient no longer takes this medication, per patient.  Said she has cancelled it three previous times with José Luis and they tell her that we keep sending it to them.  I do not see it sent since 8/2022, but patient is determined that we have sent it again.  Patient is asking if you can please call José Luis to straighten this out and cancel rx.  I told her I would ask for this to be done.

## 2022-12-06 NOTE — TELEPHONE ENCOUNTER
Caller: ANGEL    Relationship to patient: Pharmacy    Best call back number: [phone]     Patient is needing:     PHARMACY CALLED ABOUT THIS SAME ISSUE AND WOULD LIKE TO KNOW IF THE PATIENT SHOULD STILL BE ON THIS MEDICATION OR IF IT IS BEING SENT IN ERROR.     PHARMACY HAS REQUESTED A CALL BACK.

## 2022-12-07 NOTE — TELEPHONE ENCOUNTER
This was originated when she was in the hospital and no longer needs this please cancel order. Please let ismael   [phone] now

## 2022-12-09 DIAGNOSIS — E11.9 TYPE 2 DIABETES MELLITUS WITHOUT COMPLICATION, WITHOUT LONG-TERM CURRENT USE OF INSULIN: ICD-10-CM

## 2022-12-09 DIAGNOSIS — F41.8 SITUATIONAL ANXIETY: ICD-10-CM

## 2022-12-11 RX ORDER — AZELASTINE HYDROCHLORIDE 0.5 MG/ML
1 SOLUTION/ DROPS OPHTHALMIC 2 TIMES DAILY
Qty: 6 ML | Refills: 12 | Status: SHIPPED | OUTPATIENT
Start: 2022-12-11 | End: 2022-12-11

## 2022-12-11 RX ORDER — ZOLPIDEM TARTRATE 5 MG/1
TABLET ORAL
Qty: 30 TABLET | Refills: 5 | Status: SHIPPED | OUTPATIENT
Start: 2022-12-11 | End: 2023-01-10

## 2022-12-11 RX ORDER — AZELASTINE HYDROCHLORIDE 0.5 MG/ML
SOLUTION/ DROPS OPHTHALMIC
Qty: 18 ML | Refills: 2 | Status: SHIPPED | OUTPATIENT
Start: 2022-12-11 | End: 2023-01-05 | Stop reason: SDUPTHER

## 2022-12-12 DIAGNOSIS — R19.7 DIARRHEA OF PRESUMED INFECTIOUS ORIGIN: Primary | ICD-10-CM

## 2022-12-12 RX ORDER — FLUCONAZOLE 200 MG/1
TABLET ORAL
Qty: 5 TABLET | Refills: 1 | Status: SHIPPED | OUTPATIENT
Start: 2022-12-12

## 2022-12-12 RX ORDER — DIPHENOXYLATE HYDROCHLORIDE AND ATROPINE SULFATE 2.5; .025 MG/1; MG/1
2 TABLET ORAL 4 TIMES DAILY PRN
Qty: 60 TABLET | Refills: 3 | Status: SHIPPED | OUTPATIENT
Start: 2022-12-12 | End: 2022-12-27

## 2022-12-14 ENCOUNTER — TELEPHONE (OUTPATIENT)
Dept: FAMILY MEDICINE CLINIC | Facility: CLINIC | Age: 77
End: 2022-12-14

## 2022-12-14 NOTE — TELEPHONE ENCOUNTER
Caller: Maylin Garcia    Relationship: Self    Best call back number: [phone]    What medication are you requesting:  HYDROcodone-acetaminophen (NORCO)  MG per tablet   0 ordered         Summary: Take 1 tbalet every 4-6 hours MAX 5 tablets per day, Normal          If a prescription is needed, what is your preferred pharmacy and phone number: [facility] - [address] [phone] [facility] [phone]      Additional notes:  PATIENT STATES THAT SHE WAS ADVISED BY DR. PUENTE THAT SHE CAN TAKE 5 TABLETS DAILY FOR ABOUT 3 MONTHS NOW.  PATIENT IS REQUESTING A NEW PRESCRIPTION BE SENT TO [facility] TO REFLECT THIS CHANGE.

## 2022-12-15 DIAGNOSIS — M19.91 PRIMARY OSTEOARTHRITIS, UNSPECIFIED SITE: ICD-10-CM

## 2022-12-15 RX ORDER — HYDROCODONE BITARTRATE AND ACETAMINOPHEN 10; 325 MG/1; MG/1
TABLET ORAL
Qty: 360 TABLET | Refills: 0 | Status: SHIPPED | OUTPATIENT
Start: 2022-12-15 | End: 2023-01-30 | Stop reason: SDUPTHER

## 2022-12-16 NOTE — TELEPHONE ENCOUNTER
"TRIED TO REACH PATIENT    FOR HUB TO SAY:    \"Please let pt know I sent her this response in my chart.\"  "

## 2022-12-16 NOTE — TELEPHONE ENCOUNTER
Tell Maylin that I do not remember our conversation about the hydrocodone but I am uncomfortable with her taking 5 a day routinely.  What I think I said was that occasionally if she is having a particularly bad day she could take an extra 1 now and then.  But taking 5 a day routinely could very easily lead her into toxicity and problems.  Lets hold it to a maximum of 4/day and only on rare occasion take a fifth if really needed.  I am sorry if I said something that confused her but I do not want to make her sick.

## 2022-12-19 DIAGNOSIS — E11.9 TYPE 2 DIABETES MELLITUS WITHOUT COMPLICATION, WITHOUT LONG-TERM CURRENT USE OF INSULIN: ICD-10-CM

## 2022-12-19 DIAGNOSIS — F41.8 SITUATIONAL ANXIETY: ICD-10-CM

## 2022-12-19 RX ORDER — ZOLPIDEM TARTRATE 5 MG/1
5 TABLET ORAL NIGHTLY PRN
Qty: 30 TABLET | Refills: 5 | OUTPATIENT
Start: 2022-12-19 | End: 2023-01-18

## 2022-12-19 RX ORDER — METOPROLOL SUCCINATE 50 MG/1
50 TABLET, EXTENDED RELEASE ORAL DAILY
Qty: 90 TABLET | Refills: 1 | Status: SHIPPED | OUTPATIENT
Start: 2022-12-19 | End: 2022-12-26

## 2022-12-19 RX ORDER — HYDROCHLOROTHIAZIDE 25 MG/1
25 TABLET ORAL DAILY
Qty: 90 TABLET | Refills: 2 | Status: SHIPPED | OUTPATIENT
Start: 2022-12-19 | End: 2023-03-19

## 2022-12-19 NOTE — TELEPHONE ENCOUNTER
Caller: Jose Maylin SANJIV    Relationship: Self    Best call back number: [phone]    Requested Prescriptions:   Requested Prescriptions     Pending Prescriptions Disp Refills   • zolpidem (AMBIEN) 5 MG tablet 30 tablet 5     Sig: Take 1 tablet by mouth At Night As Needed for Sleep for up to 30 days.   • hydroCHLOROthiazide (HYDRODIURIL) 25 MG tablet 90 tablet 2     Sig: Take 1 tablet by mouth Daily for 90 days.   • metoprolol succinate XL (TOPROL-XL) 50 MG 24 hr tablet 90 tablet 1     Sig: Take 1 tablet by mouth Daily.        Pharmacy where request should be sent: [facility] [address] [phone] [facility] [phone]      Additional details provided by patient: LESS THAN 3 DAYS ON ZOLPIDEM    PATIENT STATES HER HYDROCHLOROTHIAZIDE WENT TO A LOCAL PHARMACY, AND SHE NEEDS THAT THROUGH THE MAIL ORDER      Does the patient have less than a 3 day supply:  [x] Yes  [] No    Would you like a call back once the refill request has been completed: [x] Yes [] No    If the office needs to give you a call back, can they leave a voicemail: [x] Yes [] No    Remedios Colbert, PCT   12/19/22 14:44 EST

## 2022-12-26 RX ORDER — METOPROLOL SUCCINATE 50 MG/1
50 TABLET, EXTENDED RELEASE ORAL DAILY
Qty: 90 TABLET | Refills: 1 | Status: SHIPPED | OUTPATIENT
Start: 2022-12-26

## 2023-01-06 RX ORDER — AZELASTINE HYDROCHLORIDE 0.5 MG/ML
1 SOLUTION/ DROPS OPHTHALMIC 2 TIMES DAILY
Qty: 18 ML | Refills: 2 | Status: SHIPPED | OUTPATIENT
Start: 2023-01-06 | End: 2023-02-22

## 2023-01-19 ENCOUNTER — TELEPHONE (OUTPATIENT)
Dept: FAMILY MEDICINE CLINIC | Facility: CLINIC | Age: 78
End: 2023-01-19
Payer: MEDICARE

## 2023-01-19 RX ORDER — NYSTATIN 100000 [USP'U]/G
POWDER TOPICAL
COMMUNITY
Start: 2022-12-13 | End: 2023-01-19 | Stop reason: SDUPTHER

## 2023-01-19 RX ORDER — NYSTATIN 100000 [USP'U]/G
POWDER TOPICAL 3 TIMES DAILY
Qty: 60 G | Refills: 6 | Status: SHIPPED | OUTPATIENT
Start: 2023-01-19 | End: 2023-04-19

## 2023-01-19 RX ORDER — QUINAPRIL 40 MG/1
40 TABLET ORAL 2 TIMES DAILY
Qty: 180 TABLET | Refills: 3 | Status: SHIPPED | OUTPATIENT
Start: 2023-01-19

## 2023-01-19 NOTE — TELEPHONE ENCOUNTER
Caller: Maylin Garcia    Relationship: Self    Best call back number: [phone] (Home)     What is the best time to reach you: ANYTIME    Who are you requesting to speak with (clinical staff, provider,  specific staff member): CLINICAL STAFF    Do you know the name of the person who called:      What was the call regarding:  quinapril (ACCUPRIL) 40 MG tablet- STATES THAT THIS MEDICATION HAS BEEN DISCONTINUED, BUT PATIENT STATES THAT SHE IS STILL ON THIS MEDICATION & NYSTATIN POWDER THAT IS NOT IN PATIENT MEDICATION PROFILE. PATIENT STATES THAT ALL OF HER MEDICATION SHOULD BE GOING THROUGH [facility] AND NOT [facility].    Do you require a callback:  YES         THANKS

## 2023-01-19 NOTE — TELEPHONE ENCOUNTER
It's still on our list [facility] with 3 refills.    The Nystatin was ordered by an outside provider in Dec so it just had to be reconciled on our side.

## 2023-01-23 RX ORDER — AMLODIPINE BESYLATE 10 MG/1
TABLET ORAL
Qty: 90 TABLET | Refills: 3 | Status: SHIPPED | OUTPATIENT
Start: 2023-01-23

## 2023-01-30 DIAGNOSIS — M19.91 PRIMARY OSTEOARTHRITIS, UNSPECIFIED SITE: ICD-10-CM

## 2023-01-30 RX ORDER — HYDROCODONE BITARTRATE AND ACETAMINOPHEN 10; 325 MG/1; MG/1
TABLET ORAL
Qty: 360 TABLET | Refills: 0 | Status: SHIPPED | OUTPATIENT
Start: 2023-01-30

## 2023-01-30 NOTE — TELEPHONE ENCOUNTER
Caller: Maylin Garcia    Relationship: Self    Best call back number: [phone]    Requested Prescriptions:   Requested Prescriptions     Pending Prescriptions Disp Refills   • HYDROcodone-acetaminophen (NORCO)  MG per tablet 360 tablet 0     Sig: Take 1 tbalet every 4-6 hours MAX 4 tablets per day        Pharmacy where request should be sent: [facility] - [address] [phone] [facility] [phone]      Additional details provided by patient: PATIENT IS NEEDING A CLINICAL MEMBER TO GIVE HER A CALLBACK.   PATIENT IS VERY CONFUSED.    SHE HAS ALWAYS GOTTEN THIS MEDICATION THROUGH [facility], FOR YEARS AND YEARS.    SHE STATES THIS WAS CALLED IN THROUGH A LOCAL PHARMACY, [facility]    PATIENT NEEDS THIS FIXED      Does the patient have less than a 3 day supply:  [x] Yes  [] No    Would you like a call back once the refill request has been completed: [x] Yes [] No    If the office needs to give you a call back, can they leave a voicemail: [x] Yes [] No    Remedios Colbert, RODRIGUE   01/30/23 13:23 EST

## 2023-02-04 RX ORDER — AZITHROMYCIN 500 MG/1
500 TABLET, FILM COATED ORAL DAILY
Qty: 5 TABLET | Refills: 0 | Status: SHIPPED | OUTPATIENT
Start: 2023-02-04 | End: 2023-02-06 | Stop reason: SDUPTHER

## 2023-02-04 RX ORDER — BROMPHENIRAMINE MALEATE, PSEUDOEPHEDRINE HYDROCHLORIDE, AND DEXTROMETHORPHAN HYDROBROMIDE 2; 30; 10 MG/5ML; MG/5ML; MG/5ML
5-10 SYRUP ORAL 4 TIMES DAILY PRN
Qty: 240 ML | Refills: 1 | Status: SHIPPED | OUTPATIENT
Start: 2023-02-04 | End: 2023-02-22

## 2023-02-06 RX ORDER — AZITHROMYCIN 500 MG/1
500 TABLET, FILM COATED ORAL DAILY
Qty: 5 TABLET | Refills: 2 | Status: SHIPPED | OUTPATIENT
Start: 2023-02-06 | End: 2023-02-11

## 2023-02-06 RX ORDER — GUAIFENESIN AND DEXTROMETHORPHAN HYDROBROMIDE 600; 30 MG/1; MG/1
2 TABLET, EXTENDED RELEASE ORAL 2 TIMES DAILY PRN
Qty: 40 TABLET | Refills: 2 | Status: SHIPPED | OUTPATIENT
Start: 2023-02-06 | End: 2023-02-16

## 2023-02-21 ENCOUNTER — TELEPHONE (OUTPATIENT)
Dept: FAMILY MEDICINE CLINIC | Facility: CLINIC | Age: 78
End: 2023-02-21

## 2023-02-21 ENCOUNTER — TELEPHONE (OUTPATIENT)
Dept: FAMILY MEDICINE CLINIC | Facility: CLINIC | Age: 78
End: 2023-02-21
Payer: MEDICARE

## 2023-02-21 DIAGNOSIS — D69.3 IDIOPATHIC THROMBOCYTOPENIC PURPURA: ICD-10-CM

## 2023-02-21 DIAGNOSIS — D47.2 MGUS (MONOCLONAL GAMMOPATHY OF UNKNOWN SIGNIFICANCE): Primary | ICD-10-CM

## 2023-02-21 DIAGNOSIS — I10 PRIMARY HYPERTENSION: ICD-10-CM

## 2023-02-21 DIAGNOSIS — E78.2 MIXED HYPERLIPIDEMIA: ICD-10-CM

## 2023-02-21 DIAGNOSIS — E11.69 TYPE 2 DIABETES MELLITUS WITH OTHER SPECIFIED COMPLICATION, WITHOUT LONG-TERM CURRENT USE OF INSULIN: ICD-10-CM

## 2023-02-21 RX ORDER — SPIRONOLACTONE 25 MG/1
1 TABLET ORAL EVERY 12 HOURS SCHEDULED
COMMUNITY
Start: 2023-01-28

## 2023-02-21 RX ORDER — NITROFURANTOIN 25; 75 MG/1; MG/1
1 CAPSULE ORAL EVERY 12 HOURS SCHEDULED
COMMUNITY
Start: 2022-12-01 | End: 2023-02-22

## 2023-02-21 RX ORDER — CEPHALEXIN 500 MG/1
1 TABLET ORAL EVERY 12 HOURS SCHEDULED
COMMUNITY
Start: 2022-12-06 | End: 2023-02-22

## 2023-02-21 NOTE — TELEPHONE ENCOUNTER
Caller: Maylin Garcia    Relationship: Self    Best call back number: [phone]    What medication are you requesting: UNKNOWN     What are your current symptoms: PAINFUL BACK SPASMS     How long have you been experiencing symptoms: 3 DAYS     Have you had these symptoms before:    [x] Yes  [] No    Have you been treated for these symptoms before:   [x] Yes  [] No    If a prescription is needed, what is your preferred pharmacy and phone number: [id number] - [facility] [place], [place] - [address] AT [facility] [address] [facility] [address] - [phone]  - [phone]     Additional notes: PATIENT HAS TAKEN MEDICATION BEFORE FOR BACK SPASMS.     PLEASE CALL PATIENT WHEN PRESCRIPTION IS SENT

## 2023-02-21 NOTE — TELEPHONE ENCOUNTER
Patient called to schedule a [facility] lab appt.  Said she needs her M-Protein, kidneys and anything else checked.  Please put lab orders in and send back to me to schedule.

## 2023-02-21 NOTE — TELEPHONE ENCOUNTER
Spoke with patient and scheduled a [facility] lab appt on 3/8/2023 at 1pm.

## 2023-02-22 ENCOUNTER — LAB (OUTPATIENT)
Dept: FAMILY MEDICINE CLINIC | Facility: CLINIC | Age: 78
End: 2023-02-22
Payer: MEDICARE

## 2023-02-22 ENCOUNTER — OFFICE VISIT (OUTPATIENT)
Dept: FAMILY MEDICINE CLINIC | Facility: CLINIC | Age: 78
End: 2023-02-22
Payer: MEDICARE

## 2023-02-22 VITALS
RESPIRATION RATE: 18 BRPM | DIASTOLIC BLOOD PRESSURE: 57 MMHG | BODY MASS INDEX: 31.34 KG/M2 | TEMPERATURE: 97.3 F | WEIGHT: 195 LBS | SYSTOLIC BLOOD PRESSURE: 110 MMHG | HEART RATE: 56 BPM | HEIGHT: 66 IN | OXYGEN SATURATION: 98 %

## 2023-02-22 DIAGNOSIS — E78.2 MIXED HYPERLIPIDEMIA: ICD-10-CM

## 2023-02-22 DIAGNOSIS — E11.69 TYPE 2 DIABETES MELLITUS WITH OTHER SPECIFIED COMPLICATION, WITHOUT LONG-TERM CURRENT USE OF INSULIN: ICD-10-CM

## 2023-02-22 DIAGNOSIS — I10 PRIMARY HYPERTENSION: ICD-10-CM

## 2023-02-22 DIAGNOSIS — S39.012A BACK STRAIN, INITIAL ENCOUNTER: Primary | ICD-10-CM

## 2023-02-22 DIAGNOSIS — D69.3 IDIOPATHIC THROMBOCYTOPENIC PURPURA: ICD-10-CM

## 2023-02-22 DIAGNOSIS — D47.2 MGUS (MONOCLONAL GAMMOPATHY OF UNKNOWN SIGNIFICANCE): ICD-10-CM

## 2023-02-22 LAB
ALBUMIN SERPL-MCNC: 4.5 G/DL (ref 3.5–5.2)
ALBUMIN/GLOB SERPL: 2.1 G/DL
ALP SERPL-CCNC: 62 U/L (ref 39–117)
ALT SERPL W P-5'-P-CCNC: 26 U/L (ref 1–33)
ANION GAP SERPL CALCULATED.3IONS-SCNC: 8 MMOL/L (ref 5–15)
AST SERPL-CCNC: 17 U/L (ref 1–32)
BACTERIA UR QL AUTO: ABNORMAL /HPF
BASOPHILS # BLD AUTO: 0.03 10*3/MM3 (ref 0–0.2)
BASOPHILS NFR BLD AUTO: 0.7 % (ref 0–1.5)
BILIRUB SERPL-MCNC: 0.5 MG/DL (ref 0–1.2)
BILIRUB UR QL STRIP: NEGATIVE
BUN SERPL-MCNC: 27 MG/DL (ref 8–23)
BUN/CREAT SERPL: 25 (ref 7–25)
CALCIUM SPEC-SCNC: 9.8 MG/DL (ref 8.6–10.5)
CHLORIDE SERPL-SCNC: 104 MMOL/L (ref 98–107)
CHOLEST SERPL-MCNC: 133 MG/DL (ref 0–200)
CLARITY UR: CLEAR
CO2 SERPL-SCNC: 29 MMOL/L (ref 22–29)
COLOR UR: YELLOW
CREAT SERPL-MCNC: 1.08 MG/DL (ref 0.57–1)
DEPRECATED RDW RBC AUTO: 42 FL (ref 37–54)
EGFRCR SERPLBLD CKD-EPI 2021: 53.7 ML/MIN/1.73
EOSINOPHIL # BLD AUTO: 0.08 10*3/MM3 (ref 0–0.4)
EOSINOPHIL NFR BLD AUTO: 1.8 % (ref 0.3–6.2)
ERYTHROCYTE [DISTWIDTH] IN BLOOD BY AUTOMATED COUNT: 12 % (ref 12.3–15.4)
GLOBULIN UR ELPH-MCNC: 2.1 GM/DL
GLUCOSE SERPL-MCNC: 85 MG/DL (ref 65–99)
GLUCOSE UR STRIP-MCNC: NEGATIVE MG/DL
HBA1C MFR BLD: 5.6 % (ref 3.5–5.6)
HCT VFR BLD AUTO: 35.6 % (ref 34–46.6)
HDLC SERPL-MCNC: 41 MG/DL (ref 40–60)
HGB BLD-MCNC: 11.8 G/DL (ref 12–15.9)
HGB UR QL STRIP.AUTO: NEGATIVE
HYALINE CASTS UR QL AUTO: ABNORMAL /LPF
IMM GRANULOCYTES # BLD AUTO: 0.02 10*3/MM3 (ref 0–0.05)
IMM GRANULOCYTES NFR BLD AUTO: 0.4 % (ref 0–0.5)
KETONES UR QL STRIP: NEGATIVE
LDLC SERPL CALC-MCNC: 66 MG/DL (ref 0–100)
LDLC/HDLC SERPL: 1.51 {RATIO}
LEUKOCYTE ESTERASE UR QL STRIP.AUTO: ABNORMAL
LYMPHOCYTES # BLD AUTO: 1.61 10*3/MM3 (ref 0.7–3.1)
LYMPHOCYTES NFR BLD AUTO: 35.7 % (ref 19.6–45.3)
MCH RBC QN AUTO: 31.3 PG (ref 26.6–33)
MCHC RBC AUTO-ENTMCNC: 33.1 G/DL (ref 31.5–35.7)
MCV RBC AUTO: 94.4 FL (ref 79–97)
MONOCYTES # BLD AUTO: 0.44 10*3/MM3 (ref 0.1–0.9)
MONOCYTES NFR BLD AUTO: 9.8 % (ref 5–12)
NEUTROPHILS NFR BLD AUTO: 2.33 10*3/MM3 (ref 1.7–7)
NEUTROPHILS NFR BLD AUTO: 51.6 % (ref 42.7–76)
NITRITE UR QL STRIP: NEGATIVE
NRBC BLD AUTO-RTO: 0 /100 WBC (ref 0–0.2)
PH UR STRIP.AUTO: 5.5 [PH] (ref 5–8)
PLATELET # BLD AUTO: 203 10*3/MM3 (ref 140–450)
PMV BLD AUTO: 10.6 FL (ref 6–12)
POTASSIUM SERPL-SCNC: 3.9 MMOL/L (ref 3.5–5.2)
PROT SERPL-MCNC: 6.6 G/DL (ref 6–8.5)
PROT UR QL STRIP: NEGATIVE
RBC # BLD AUTO: 3.77 10*6/MM3 (ref 3.77–5.28)
RBC # UR STRIP: ABNORMAL /HPF
REF LAB TEST METHOD: ABNORMAL
SODIUM SERPL-SCNC: 141 MMOL/L (ref 136–145)
SP GR UR STRIP: 1.02 (ref 1–1.03)
SQUAMOUS #/AREA URNS HPF: ABNORMAL /HPF
TRIGL SERPL-MCNC: 151 MG/DL (ref 0–150)
TSH SERPL DL<=0.05 MIU/L-ACNC: 1.74 UIU/ML (ref 0.27–4.2)
UROBILINOGEN UR QL STRIP: ABNORMAL
VLDLC SERPL-MCNC: 26 MG/DL (ref 5–40)
WBC # UR STRIP: ABNORMAL /HPF
WBC NRBC COR # BLD: 4.51 10*3/MM3 (ref 3.4–10.8)

## 2023-02-22 PROCEDURE — 85025 COMPLETE CBC W/AUTO DIFF WBC: CPT | Performed by: FAMILY MEDICINE

## 2023-02-22 PROCEDURE — 80053 COMPREHEN METABOLIC PANEL: CPT | Performed by: FAMILY MEDICINE

## 2023-02-22 PROCEDURE — 81001 URINALYSIS AUTO W/SCOPE: CPT | Performed by: FAMILY MEDICINE

## 2023-02-22 PROCEDURE — 87086 URINE CULTURE/COLONY COUNT: CPT | Performed by: FAMILY MEDICINE

## 2023-02-22 PROCEDURE — 84165 PROTEIN E-PHORESIS SERUM: CPT | Performed by: FAMILY MEDICINE

## 2023-02-22 PROCEDURE — 84443 ASSAY THYROID STIM HORMONE: CPT | Performed by: FAMILY MEDICINE

## 2023-02-22 PROCEDURE — 36415 COLL VENOUS BLD VENIPUNCTURE: CPT

## 2023-02-22 PROCEDURE — 99213 OFFICE O/P EST LOW 20 MIN: CPT | Performed by: FAMILY MEDICINE

## 2023-02-22 PROCEDURE — 80061 LIPID PANEL: CPT | Performed by: FAMILY MEDICINE

## 2023-02-22 PROCEDURE — 83036 HEMOGLOBIN GLYCOSYLATED A1C: CPT | Performed by: FAMILY MEDICINE

## 2023-02-22 RX ORDER — PREDNISONE 20 MG/1
TABLET ORAL
Qty: 25 TABLET | Refills: 1 | Status: SHIPPED | OUTPATIENT
Start: 2023-02-22 | End: 2023-02-22

## 2023-02-22 RX ORDER — CYCLOBENZAPRINE HCL 10 MG
10 TABLET ORAL 3 TIMES DAILY PRN
Qty: 30 TABLET | Refills: 0 | Status: SHIPPED | OUTPATIENT
Start: 2023-02-22 | End: 2023-02-22

## 2023-02-22 RX ORDER — CYCLOBENZAPRINE HCL 10 MG
10 TABLET ORAL 3 TIMES DAILY PRN
Qty: 30 TABLET | Refills: 0 | Status: SHIPPED | OUTPATIENT
Start: 2023-02-22 | End: 2023-03-10

## 2023-02-22 RX ORDER — PREDNISONE 20 MG/1
TABLET ORAL
Qty: 25 TABLET | Refills: 1 | Status: SHIPPED | OUTPATIENT
Start: 2023-02-22

## 2023-02-22 NOTE — PROGRESS NOTES
"Chief Complaint  Back Pain (Patient lifted her grandchild 5 days ago and has been experiencing  low back spasms since.)    Subjective      Maylin Garcia presents to [facility]  History of Present Illness     The patient presents today for a follow-up.     She was lifting her grandchild approximately 1 week ago and approximately 30 minutes after she bent over the bed and pulled her up out of the bed, she had spasms occur in her lower back. They have been progressively worse since then and now she has intense pain in her lower back bilaterally without any radiation down either leg. Her bowel and bladder function are normal. Strength in her legs is normal, but she is just having horrible spasms in her back that, at times, takes her breath away.    Objective   Vital Signs:  /57   Pulse 56   Temp 97.3 °F (36.3 °C)   Resp 18   Ht 167.6 cm (65.98\")   Wt 88.5 kg (195 lb)   SpO2 98%   BMI 31.49 kg/m²   Estimated body mass index is 31.49 kg/m² as calculated from the following:    Height as of this encounter: 167.6 cm (65.98\").    Weight as of this encounter: 88.5 kg (195 lb).             Physical Exam  Constitutional:       Appearance: Normal appearance. She is well-developed and normal weight.   HENT:      Head: Normocephalic and atraumatic.      Right Ear: Tympanic membrane, ear canal and external ear normal.      Left Ear: Tympanic membrane, ear canal and external ear normal.      Nose: Nose normal.      Mouth/Throat:      Mouth: Mucous membranes are moist.      Pharynx: Oropharynx is clear. No oropharyngeal exudate.   Eyes:      Extraocular Movements: Extraocular movements intact.      Conjunctiva/sclera: Conjunctivae normal.      Pupils: Pupils are equal, round, and reactive to light.   Cardiovascular:      Rate and Rhythm: Normal rate and regular rhythm.      Pulses: Normal pulses.      Heart sounds: Normal heart sounds.   Pulmonary:      Effort: Pulmonary effort is " normal.      Breath sounds: Normal breath sounds.   Abdominal:      General: Bowel sounds are normal.      Palpations: Abdomen is soft.   Musculoskeletal:         General: Normal range of motion.      Cervical back: Normal range of motion and neck supple.      Comments: Bilateral para spinal muscle spasms LS area.   She can bend forward only 25 degrees and ext is 10 degrees.  Lat bends about 10 each way.  No sciatica.  All muscle pain   Skin:     General: Skin is warm and dry.   Neurological:      General: No focal deficit present.      Mental Status: She is alert and oriented to person, place, and time. Mental status is at baseline.   Psychiatric:         Mood and Affect: Mood normal.         Behavior: Behavior normal.         Thought Content: Thought content normal.         Judgment: Judgment normal.        Assessment and Plan     1. Back strain due to lifting  - I am going to give her prednisone. She is going to start at 68 mg in the morning for 4 days and then taper down over the next 2 weeks. She is also going to take Flexeril 10 mg 3 times a day. I think she will get a good relief from this. She does not have any evidence of a ruptured disc or any type of surgical problem with her back. I am pretty sure it just appeared back strain.         Follow Up Return in about 4 months (around 6/22/2023), or if symptoms worsen or fail to improve, for Medicare Wellness.  Patient was given instructions and counseling regarding her condition or for health maintenance advice. Please see specific information pulled into the AVS if appropriate.     Transcribed from ambient dictation for Abiodun Busch MD by Isabel Trimble.  02/22/23   09:32 EST    Patient or patient representative verbalized consent to the visit recording.  I have personally performed the services described in this document as transcribed by the above individual, and it is both accurate and complete.  Abiodun Busch MD  2/27/2023  07:18 EST

## 2023-02-23 LAB — BACTERIA SPEC AEROBE CULT: NO GROWTH

## 2023-02-23 NOTE — PROGRESS NOTES
Kimberly call Maylin and tell her that her labs overall look pretty good.  Her thyroid functions are excellent her liver function tests are excellent her electrolytes are good her kidney function tests are better than they have been over the last 3 to 4 months.  Her cholesterol and LDL are excellent.  Overall these tests look very good

## 2023-02-24 LAB
ALBUMIN SERPL ELPH-MCNC: 3.9 G/DL (ref 2.9–4.4)
ALBUMIN/GLOB SERPL: 1.2 {RATIO} (ref 0.7–1.7)
ALPHA1 GLOB SERPL ELPH-MCNC: 0.3 G/DL (ref 0–0.4)
ALPHA2 GLOB SERPL ELPH-MCNC: 0.8 G/DL (ref 0.4–1)
B-GLOBULIN SERPL ELPH-MCNC: 1 G/DL (ref 0.7–1.3)
GAMMA GLOB SERPL ELPH-MCNC: 1.2 G/DL (ref 0.4–1.8)
GLOBULIN SER CALC-MCNC: 3.3 G/DL (ref 2.2–3.9)
LABORATORY COMMENT REPORT: NORMAL
M PROTEIN SERPL ELPH-MCNC: NORMAL G/DL
PROT PATTERN SERPL ELPH-IMP: NORMAL
PROT SERPL-MCNC: 7.2 G/DL (ref 6–8.5)

## 2023-02-27 RX ORDER — FLUOXETINE HYDROCHLORIDE 20 MG/1
CAPSULE ORAL
Qty: 90 CAPSULE | Refills: 3 | Status: SHIPPED | OUTPATIENT
Start: 2023-02-27

## 2023-03-06 RX ORDER — PANTOPRAZOLE SODIUM 40 MG/1
40 TABLET, DELAYED RELEASE ORAL DAILY
Qty: 90 TABLET | Refills: 3 | Status: SHIPPED | OUTPATIENT
Start: 2023-03-06

## 2023-03-06 NOTE — TELEPHONE ENCOUNTER
Caller: Maylin Garcia    Relationship: Self    Best call back number: [phone]  Requested Prescriptions:   Requested Prescriptions     Pending Prescriptions Disp Refills   • pantoprazole (PROTONIX) 40 MG EC tablet 90 tablet 3     Sig: Take 1 tablet by mouth Daily.        Pharmacy where request should be sent: [facility] - [address] [phone] [facility] [phone] FX     Additional details provided by patient: PATIENT STATES THAT SHE HAS A 7 DAY SUPPLY OF MEDICATION AND HER PHARMACY REQUESTS A NEW PRESCRIPTION WITH 90 DAY SUPPLY.    Does the patient have less than a 3 day supply:  [] Yes  [x] No    Would you like a call back once the refill request has been completed: [] Yes [x] No    If the office needs to give you a call back, can they leave a voicemail: [] Yes [x] No    PLEASE ADVISE.    Francine Almanzar Rep   03/06/23 12:05 EST

## 2023-03-10 RX ORDER — AZITHROMYCIN 500 MG/1
500 TABLET, FILM COATED ORAL DAILY
Qty: 5 TABLET | Refills: 0 | OUTPATIENT
Start: 2023-03-10 | End: 2023-03-15

## 2023-03-10 RX ORDER — CYCLOBENZAPRINE HCL 10 MG
TABLET ORAL
Qty: 30 TABLET | Refills: 0 | Status: SHIPPED | OUTPATIENT
Start: 2023-03-10

## 2023-03-14 DIAGNOSIS — F41.8 SITUATIONAL ANXIETY: ICD-10-CM

## 2023-03-14 RX ORDER — DIAZEPAM 10 MG/1
TABLET ORAL
Qty: 3 TABLET | Refills: 0 | Status: SHIPPED | OUTPATIENT
Start: 2023-03-14

## 2023-04-10 RX ORDER — QUINAPRIL 40 MG/1
40 TABLET ORAL 2 TIMES DAILY
Qty: 180 TABLET | Refills: 3 | Status: SHIPPED | OUTPATIENT
Start: 2023-04-10 | End: 2023-04-14 | Stop reason: SDUPTHER

## 2023-04-14 RX ORDER — QUINAPRIL 40 MG/1
40 TABLET ORAL 2 TIMES DAILY
Qty: 180 TABLET | Refills: 3 | Status: SHIPPED | OUTPATIENT
Start: 2023-04-14 | End: 2023-04-20

## 2023-04-14 NOTE — TELEPHONE ENCOUNTER
Caller: Maylin Garcia    Relationship: Self    Best call back number: [phone]    What is the best time to reach you: ANY    Who are you requesting to speak with (clinical staff, provider,  specific staff member):CLINICAL    Do you know the name of the person who called:     What was the call regarding: PATIENT USUALLY GETS HER quinapril (ACCUPRIL) 40 MG tablet WITH [facility] BUT THEY SAID THAT THEY ARE OUT OF STOCK. PHARMACY ADVISED PATIENT TO SWITCH TO A SIMILAR MEDICATION. IF MEDICATION IS SWITCHED, SHE WOULD LIKE FOR IT TO BE SENT TO [facility].    Do you require a callback: YES

## 2023-04-20 ENCOUNTER — TELEPHONE (OUTPATIENT)
Dept: FAMILY MEDICINE CLINIC | Facility: CLINIC | Age: 78
End: 2023-04-20
Payer: MEDICARE

## 2023-04-20 RX ORDER — LOSARTAN POTASSIUM 100 MG/1
100 TABLET ORAL DAILY
Qty: 90 TABLET | Refills: 2 | Status: SHIPPED | OUTPATIENT
Start: 2023-04-20 | End: 2023-04-21

## 2023-04-20 NOTE — TELEPHONE ENCOUNTER
Caller: Maylin Garcia    Relationship: Self    Best call back number: [phone]    What medication are you requesting: REPLACEMENT FOR quinapril (ACCUPRIL) 40 MG tablet    If a prescription is needed, what is your preferred pharmacy and phone number: [facility] - [facility] [address] [facility] [phone] [facility] [phone] FX     Additional notes: PLEASE SEE PREVIOUS SIGNED ENCOUNTER. PT CALLED TO REQUEST REPLACEMENT FOR quinapril (ACCUPRIL) 40 MG tablet BECAUSE SHE CANNOT FIND A PHARMACY THAT HAS IT IN STOCK, BUT INSTEAD OF A REPLACEMENT THE SAME PRESCRIPTION WAS JUST RESENT TO A DIFFERENT PHARMACY. PT IS REQUESTING A CALL BACK ONCE COMPLETE.

## 2023-04-21 RX ORDER — AMOXICILLIN 500 MG/1
TABLET, FILM COATED ORAL
COMMUNITY
Start: 2023-03-16

## 2023-04-21 RX ORDER — ATORVASTATIN CALCIUM 20 MG/1
TABLET, FILM COATED ORAL EVERY 24 HOURS
COMMUNITY
End: 2023-04-21

## 2023-04-21 RX ORDER — LISINOPRIL 40 MG/1
40 TABLET ORAL DAILY
Qty: 90 TABLET | Refills: 2 | Status: SHIPPED | OUTPATIENT
Start: 2023-04-21 | End: 2023-04-24 | Stop reason: SDUPTHER

## 2023-04-21 NOTE — TELEPHONE ENCOUNTER
Switched to Lisinopril.  She has to understand that all of the medications can contribute to hyperkalemia since we have her on Aldactone.  The Accupril that she was on before was in the same category.  Take the medications get the blood pressure down that is the most important thing and we can always check the potassium levels.  We probably should check them once a month until everyone is reassured that they are staying reasonably normal

## 2023-04-21 NOTE — TELEPHONE ENCOUNTER
Caller: Maylin Garcia    Relationship to patient: Self    Best call back number: [phone]    Patient is needing: PATIENT RECEIVED A CALL FROM THE PHARMACY ABOUT LOSARTAN, SHE IS WANTING TO SEE IF THAT IS WHAT SHE SHOULD BE TAKING     HUB UNABLE TO RELAY MESSAGE DUE TO NOT SHOWING HUB TO READ

## 2023-04-21 NOTE — TELEPHONE ENCOUNTER
Gave message to patient at 10:50am.  She is afraid to take Losartan because she was on it while in the hospital and it caused her potassium to go aditya high and they had to put her on several meds because of it and take her off of Losartan.  Also, Losartan has been recalled several times and patient does not like that.  Is there something else patient can take for bp?

## 2023-04-21 NOTE — TELEPHONE ENCOUNTER
tell Maylin that we are stopping her Accupril and starting losartan 100 mg a day.  It may take a week or 2 for it to work completely but she should see reasonably good blood pressure control within 2 to 3 weeks

## 2023-04-24 ENCOUNTER — TELEPHONE (OUTPATIENT)
Dept: FAMILY MEDICINE CLINIC | Facility: CLINIC | Age: 78
End: 2023-04-24
Payer: MEDICARE

## 2023-04-24 RX ORDER — LISINOPRIL 40 MG/1
40 TABLET ORAL DAILY
Qty: 90 TABLET | Refills: 2 | Status: SHIPPED | OUTPATIENT
Start: 2023-04-24 | End: 2023-07-23

## 2023-04-24 NOTE — TELEPHONE ENCOUNTER
Caller: Maylin Garcia    Relationship: Self    Best call back number: [phone]    What was the call regarding: PATIENT REQUEST FOLLOWING PRESCRIPTION BE SENT TO [facility]    DRU    [facility] - [address] [phone] [facility] [phone]     PLEASE CALL PATIENT WHEN THIS HAS BEEN DONE.      Do you require a callback: YES

## 2023-05-05 RX ORDER — SPIRONOLACTONE 25 MG/1
TABLET ORAL
Qty: 180 TABLET | Refills: 0 | Status: SHIPPED | OUTPATIENT
Start: 2023-05-05

## 2023-05-08 DIAGNOSIS — M19.91 PRIMARY OSTEOARTHRITIS, UNSPECIFIED SITE: ICD-10-CM

## 2023-05-09 RX ORDER — HYDROCODONE BITARTRATE AND ACETAMINOPHEN 10; 325 MG/1; MG/1
TABLET ORAL
Qty: 360 TABLET | Refills: 0 | Status: SHIPPED | OUTPATIENT
Start: 2023-05-09

## 2023-05-12 RX ORDER — CYCLOBENZAPRINE HCL 10 MG
TABLET ORAL
Qty: 30 TABLET | Refills: 0 | Status: SHIPPED | OUTPATIENT
Start: 2023-05-12

## 2023-05-23 RX ORDER — HYDROCHLOROTHIAZIDE 25 MG/1
25 TABLET ORAL DAILY
Qty: 90 TABLET | Refills: 2 | Status: SHIPPED | OUTPATIENT
Start: 2023-05-23 | End: 2023-08-21

## 2023-05-26 ENCOUNTER — OFFICE VISIT (OUTPATIENT)
Dept: FAMILY MEDICINE CLINIC | Facility: CLINIC | Age: 78
End: 2023-05-26

## 2023-05-26 ENCOUNTER — LAB (OUTPATIENT)
Dept: FAMILY MEDICINE CLINIC | Facility: CLINIC | Age: 78
End: 2023-05-26

## 2023-05-26 VITALS
DIASTOLIC BLOOD PRESSURE: 56 MMHG | WEIGHT: 201 LBS | RESPIRATION RATE: 15 BRPM | SYSTOLIC BLOOD PRESSURE: 118 MMHG | HEIGHT: 66 IN | OXYGEN SATURATION: 97 % | BODY MASS INDEX: 32.3 KG/M2 | HEART RATE: 60 BPM | TEMPERATURE: 98 F

## 2023-05-26 DIAGNOSIS — G37.9 DEMYELINATING DISEASE OF CENTRAL NERVOUS SYSTEM, UNSPECIFIED: ICD-10-CM

## 2023-05-26 DIAGNOSIS — K21.00 GASTROESOPHAGEAL REFLUX DISEASE WITH ESOPHAGITIS WITHOUT HEMORRHAGE: ICD-10-CM

## 2023-05-26 DIAGNOSIS — Z00.00 MEDICARE ANNUAL WELLNESS VISIT, SUBSEQUENT: Primary | ICD-10-CM

## 2023-05-26 DIAGNOSIS — I10 PRIMARY HYPERTENSION: ICD-10-CM

## 2023-05-26 DIAGNOSIS — E11.9 TYPE 2 DIABETES MELLITUS WITHOUT COMPLICATION, WITHOUT LONG-TERM CURRENT USE OF INSULIN: ICD-10-CM

## 2023-05-26 DIAGNOSIS — E55.9 VITAMIN D DEFICIENCY, UNSPECIFIED: ICD-10-CM

## 2023-05-26 DIAGNOSIS — M19.91 PRIMARY OSTEOARTHRITIS, UNSPECIFIED SITE: ICD-10-CM

## 2023-05-26 DIAGNOSIS — E78.2 MIXED HYPERLIPIDEMIA: ICD-10-CM

## 2023-05-26 PROBLEM — D49.6 PRIMARY BRAIN TUMOR: Status: RESOLVED | Noted: 2022-06-21 | Resolved: 2023-05-26

## 2023-05-26 PROCEDURE — 84165 PROTEIN E-PHORESIS SERUM: CPT | Performed by: FAMILY MEDICINE

## 2023-05-26 PROCEDURE — 87086 URINE CULTURE/COLONY COUNT: CPT | Performed by: FAMILY MEDICINE

## 2023-05-26 PROCEDURE — 87186 SC STD MICRODIL/AGAR DIL: CPT | Performed by: FAMILY MEDICINE

## 2023-05-26 PROCEDURE — 85025 COMPLETE CBC W/AUTO DIFF WBC: CPT | Performed by: FAMILY MEDICINE

## 2023-05-26 PROCEDURE — 83036 HEMOGLOBIN GLYCOSYLATED A1C: CPT | Performed by: FAMILY MEDICINE

## 2023-05-26 PROCEDURE — 82306 VITAMIN D 25 HYDROXY: CPT | Performed by: FAMILY MEDICINE

## 2023-05-26 PROCEDURE — 84439 ASSAY OF FREE THYROXINE: CPT | Performed by: FAMILY MEDICINE

## 2023-05-26 PROCEDURE — 84443 ASSAY THYROID STIM HORMONE: CPT | Performed by: FAMILY MEDICINE

## 2023-05-26 PROCEDURE — 82607 VITAMIN B-12: CPT | Performed by: FAMILY MEDICINE

## 2023-05-26 PROCEDURE — 80053 COMPREHEN METABOLIC PANEL: CPT | Performed by: FAMILY MEDICINE

## 2023-05-26 PROCEDURE — 36415 COLL VENOUS BLD VENIPUNCTURE: CPT | Performed by: FAMILY MEDICINE

## 2023-05-26 PROCEDURE — 80061 LIPID PANEL: CPT | Performed by: FAMILY MEDICINE

## 2023-05-26 PROCEDURE — 81001 URINALYSIS AUTO W/SCOPE: CPT | Performed by: FAMILY MEDICINE

## 2023-05-26 PROCEDURE — 87077 CULTURE AEROBIC IDENTIFY: CPT | Performed by: FAMILY MEDICINE

## 2023-05-26 NOTE — PROGRESS NOTES
The ABCs of the Annual Wellness Visit  Subsequent Medicare Wellness Visit    Subjective    Maylin Garcia is a 77 y.o. female who presents for a Subsequent Medicare Wellness Visit.       The following portions of the patient's history were reviewed and   updated as appropriate: allergies, current medications, past family history, past medical history, past social history, past surgical history and problem list.    Compared to one year ago, the patient feels her physical   health is better.    Compared to one year ago, the patient feels her mental   health is better.    Recent Hospitalizations:  This patient has had a [facility] admission record on file within the last 365 days.    Current Medical Providers:  Patient Care Team:  Abiodun Busch MD as PCP - General (Family Medicine)    Outpatient Medications Prior to Visit   Medication Sig Dispense Refill   • amLODIPine (NORVASC) 10 MG tablet TAKE 1 TABLET DAILY 90 tablet 3   • atorvastatin (LIPITOR) 10 MG tablet TAKE 1 TABLET DAILY 90 tablet 3   • CRANBERRY EXTRACT PO Take  by mouth.     • cyclobenzaprine (FLEXERIL) 10 MG tablet TAKE 1 TABLET BY MOUTH THREE TIMES DAILY FOR UP TO 10 DAYS AS NEEDED FOR MUSCLE SPASMS 30 tablet 0   • D-Mannose 500 MG capsule Take 2 capsules by mouth Daily.     • diazePAM (Valium) 10 MG tablet Take one tablet about 3-4 hours prior to procedure.   Take second tablet on arrival to hospital.   Take 3rd tablet only if needed at time of procedure. 3 tablet 0   • FLUoxetine (PROzac) 20 MG capsule TAKE 1 CAPSULE DAILY 90 capsule 3   • glimepiride (AMARYL) 1 MG tablet TAKE 1 TABLET EVERY MORNING BEFORE BREAKFAST 90 tablet 3   • hydroCHLOROthiazide (HYDRODIURIL) 25 MG tablet Take 1 tablet by mouth Daily for 90 days. 90 tablet 2   • HYDROcodone-acetaminophen (NORCO)  MG per tablet Take 1 tbalet every 4-6 hours MAX 4 tablets per day 360 tablet 0   • lisinopril (PRINIVIL,ZESTRIL) 40 MG tablet Take 1 tablet by mouth Daily for 90 days.  90 tablet 2   • metFORMIN (GLUCOPHAGE) 500 MG tablet TAKE 2 TABLETS TWICE A  tablet 3   • metoprolol succinate XL (TOPROL-XL) 50 MG 24 hr tablet TAKE 1 TABLET BY MOUTH DAILY 90 tablet 1   • Multiple Vitamin (ONE-A-DAY ESSENTIAL) tablet Take  by mouth.     • pantoprazole (PROTONIX) 40 MG EC tablet Take 1 tablet by mouth Daily. 90 tablet 3   • amoxicillin (AMOXIL) 500 MG tablet take 1 tablet by mouth three times daily until all taken     • fluconazole (Diflucan) 200 MG tablet One po qod for 5 doses. 5 tablet 1   • predniSONE (DELTASONE) 20 MG tablet Take 3 po qd for 4d  then 2 qd for 4d then take 1 qd for 4d  then 1/2 qd for 2d 25 tablet 1   • spironolactone (ALDACTONE) 25 MG tablet TAKE 1 TABLET BY MOUTH TWICE DAILY 180 tablet 0     No facility-administered medications prior to visit.       Opioid medication/s are on active medication list.  and I have evaluated her active treatment plan and pain score trends (see table).  There were no vitals filed for this visit.  I have reviewed the chart for potential of high risk medication and harmful drug interactions in the elderly.            Aspirin is not on active medication list.  Aspirin use is not indicated based on review of current medical condition/s. Risk of harm outweighs potential benefits.  .    Patient Active Problem List   Diagnosis   • Asthmatic bronchitis   • Low back pain   • Hyperlipidemia   • Hypertension   • Osteoarthritis   • Overweight   • Type 2 diabetes mellitus   • Medicare annual wellness visit, subsequent   • Vitamin D deficiency   • Calculus of kidney   • Cataract   • Depressive disorder   • Gastroesophageal reflux disease   • Idiopathic thrombocytopenic purpura   • Polyp of colon   • Chest pain   • History of total hip replacement, left   • Carpal tunnel syndrome on both sides   • Combined form of senile cataract   • Diabetes mellitus without complication   • Disorder of bone density and structure, unspecified   • Family hx osteoporosis  "  • Altered mental status, unspecified altered mental status type   • Acute UTI (urinary tract infection)   • Acute UTI   • Carbuncle   • Anemia   • Abscess   • Functional heart murmur   • Neurological disorder   • Acute renal injury   • Monilial intertrigo   • Back strain   • Demyelinating disease of central nervous system, unspecified     Advance Care Planning   Advance Care Planning     Advance Directive is not on file.  ACP discussion was held with the patient during this visit. Patient has an advance directive (not in EMR), copy requested.     Objective    Vitals:    23 1320   BP: 118/56   Pulse: 60   Resp: 15   Temp: 98 °F (36.7 °C)   SpO2: 97%   Weight: 91.2 kg (201 lb)   Height: 167.6 cm (65.98\")     Estimated body mass index is 32.46 kg/m² as calculated from the following:    Height as of this encounter: 167.6 cm (65.98\").    Weight as of this encounter: 91.2 kg (201 lb).    BMI is >= 30 and <35. (Class 1 Obesity). The following options were offered after discussion;: exercise counseling/recommendations      Does the patient have evidence of cognitive impairment? No    Lab Results   Component Value Date    TRIG 283 (H) 2023    HDL 36 (L) 2023    LDL 60 2023    VLDL 45 (H) 2023    HGBA1C 5.70 (H) 2023        HEALTH RISK ASSESSMENT    Smoking Status:  Social History     Tobacco Use   Smoking Status Former   • Packs/day: 1.50   • Years: 15.00   • Pack years: 22.50   • Types: Cigarettes   • Start date: 1967   • Quit date: 10/31/1981   • Years since quittin.6   Smokeless Tobacco Never   Tobacco Comments    Quit smoking 42 years ago     Alcohol Consumption:  Social History     Substance and Sexual Activity   Alcohol Use Never     Fall Risk Screen:    ELAINA Fall Risk Assessment was completed, and patient is at HIGH risk for falls. Assessment completed on:2023    Depression Screenin/26/2023     1:24 PM   PHQ-2/PHQ-9 Depression Screening   Little Interest " or Pleasure in Doing Things 0-->not at all   Feeling Down, Depressed or Hopeless 0-->not at all   PHQ-9: Brief Depression Severity Measure Score 0       Health Habits and Functional and Cognitive Screenin/26/2023     1:00 PM   Functional & Cognitive Status   Do you have difficulty preparing food and eating? No   Do you have difficulty bathing yourself, getting dressed or grooming yourself? No   Do you have difficulty using the toilet? No   Do you have difficulty moving around from place to place? Yes   Do you have trouble with steps or getting out of a bed or a chair? Yes   Current Diet Well Balanced Diet   Dental Exam Up to date   Eye Exam Up to date   Exercise (times per week) 2 times per week   Current Exercises Include Swimming;Walking   Do you need help using the phone?  No   Are you deaf or do you have serious difficulty hearing?  No   Do you need help with transportation? Yes   Do you need help shopping? No   Do you need help preparing meals?  No   Do you need help with housework?  Yes   Do you need help with laundry? No   Do you need help taking your medications? No   Do you need help managing money? No   Do you ever drive or ride in a car without wearing a seat belt? No   Have you felt unusual stress, anger or loneliness in the last month? No   Who do you live with? Spouse   If you need help, do you have trouble finding someone available to you? No   Have you been bothered in the last four weeks by sexual problems? No   Do you have difficulty concentrating, remembering or making decisions? No       Age-appropriate Screening Schedule:  Refer to the list below for future screening recommendations based on patient's age, sex and/or medical conditions. Orders for these recommended tests are listed in the plan section. The patient has been provided with a written plan.    Health Maintenance   Topic Date Due   • Pneumococcal Vaccine 65+ (2 - PCV) 2016   • DIABETIC FOOT EXAM  Never done   • ANNUAL  WELLNESS VISIT  03/10/2023   • URINE MICROALBUMIN  03/10/2023   • INFLUENZA VACCINE  08/01/2023   • DIABETIC EYE EXAM  10/31/2023   • HEMOGLOBIN A1C  11/26/2023   • DXA SCAN  04/13/2024   • LIPID PANEL  05/26/2024   • COLORECTAL CANCER SCREENING  02/21/2029   • TDAP/TD VACCINES (3 - Td or Tdap) 11/15/2031   • HEPATITIS C SCREENING  Completed   • COVID-19 Vaccine  Completed   • ZOSTER VACCINE  Completed                  CMS Preventative Services Quick Reference  Risk Factors Identified During Encounter  Fall Risk-High or Moderate: Discussed Fall Prevention in the home, Information on Fall Prevention Shared in After Visit Summary and Sit to Stand Exercise Information Shared in After Visit Summary  Inactivity/Sedentary: Patient was advised to exercise at least 150 minutes a week per CDC recommendations.  The above risks/problems have been discussed with the patient.  Pertinent information has been shared with the patient in the After Visit Summary.  An After Visit Summary and PPPS were made available to the patient.    Follow Up:   Next Medicare Wellness visit to be scheduled in 1 year.       Additional E&M Note during same encounter follows:  Patient has multiple medical problems which are significant and separately identifiable that require additional work above and beyond the Medicare Wellness Visit.      Chief Complaint  Medicare Wellness-subsequent    Subjective      HPI  Maylin Garcia presents to the clinic for a Medicare wellness visit. She is accompanied by an adult male.    She is up to date on her colonoscopy, mammogram, Pap smear, and DEXA scan. She is diabetic and she gets her eyes examined. She just had cataract surgery so she can see and drive because she was practically blind. She is 20/30 now. She sees a podiatrist, Dr. Kessler, routinely. She has very little tingling occasionally and her hand issue that goes away in 15 minutes of severe numbness. She can not feel her hands at all when she wakes up,  but as soon as she moves around, it is normal. She has really good ski mittens from years ago from [place] and she rides a bike occasionally. Her hands become numb when it is cold and she wears gloves when she exercises. She feels a lot better than she did 1 year ago when she could barely speak.    She had an optic neuritis 25 years ago and she must have had a mild case of multiple sclerosis because they saw old lesions. She is not experiencing any new symptoms and they decided not to treat it. She is feeling better. She mentions having terrible arthritis, although she does not see a rheumatologist. She went to a dermatologist approximately 1 year ago but she has not gone routinely in the last couple of years. She sees Dr. Cazares every 6 months now and the last 3 images had disappeared. She had all the tests done 25 years ago and nothing showed up. Her speech is pretty good now. She has arthritis in her hands, back, hips, knees, and ankles. She does pretty well though as long as she stays moving, she has some pain and stiffness, but she seems to get along pretty well. She takes hydrocodone with Tylenol for the pain and that allows her to move well.     She has had a feeling of phlegm in her lungs but it is not deep. She thought it was allergies and she has a little bit of coughing. She takes Allegra.    She is trying to do water walking for exercise.    She experienced vitamin D deficiency in the past, which has been replaced. We are hoping she does well on her recheck today. If not, we will add in more vitamin D replacement.     Review of Systems   Constitutional: Negative.  Negative for fatigue and fever.   HENT: Positive for congestion. Negative for sore throat, trouble swallowing and voice change.    Eyes: Negative.  Negative for redness and visual disturbance.   Respiratory: Positive for cough. Negative for chest tightness, shortness of breath and wheezing.    Cardiovascular: Negative.  Negative for chest  "pain, palpitations and leg swelling.   Gastrointestinal: Negative.  Negative for abdominal distention, abdominal pain, anal bleeding, diarrhea and nausea.   Endocrine: Negative.  Negative for cold intolerance and heat intolerance.   Genitourinary: Negative.  Negative for difficulty urinating, dysuria, pelvic pain and vaginal bleeding.   Musculoskeletal: Positive for arthralgias, back pain and gait problem.   Skin: Negative.  Negative for rash.   Allergic/Immunologic: Negative.  Negative for environmental allergies.   Neurological: Negative for weakness and light-headedness.   Hematological: Negative.  Does not bruise/bleed easily.   Psychiatric/Behavioral: Negative.  Negative for agitation, decreased concentration and sleep disturbance. The patient is not hyperactive.    All other systems reviewed and are negative.      Objective   Vital Signs:  /56   Pulse 60   Temp 98 °F (36.7 °C)   Resp 15   Ht 167.6 cm (65.98\")   Wt 91.2 kg (201 lb)   SpO2 97%   BMI 32.46 kg/m²     Physical Exam  Vitals reviewed.   Constitutional:       Appearance: Normal appearance. She is well-developed. She is obese.   HENT:      Head: Normocephalic and atraumatic.      Right Ear: Tympanic membrane, ear canal and external ear normal.      Left Ear: Tympanic membrane, ear canal and external ear normal.      Nose: Nose normal.      Mouth/Throat:      Mouth: Mucous membranes are moist.      Pharynx: Oropharynx is clear. No oropharyngeal exudate.   Eyes:      Extraocular Movements: Extraocular movements intact.      Conjunctiva/sclera: Conjunctivae normal.      Pupils: Pupils are equal, round, and reactive to light.   Cardiovascular:      Rate and Rhythm: Normal rate and regular rhythm.      Pulses: Normal pulses.      Heart sounds: Normal heart sounds.   Pulmonary:      Effort: Pulmonary effort is normal.      Breath sounds: Normal breath sounds.   Abdominal:      General: Bowel sounds are normal.      Palpations: Abdomen is soft. "      Comments: overweight   Musculoskeletal:         General: Swelling and deformity present. Normal range of motion.      Cervical back: Normal range of motion and neck supple.      Comments: OA knees and back   Skin:     General: Skin is warm and dry.   Neurological:      General: No focal deficit present.      Mental Status: She is alert and oriented to person, place, and time. Mental status is at baseline.   Psychiatric:         Mood and Affect: Mood normal.         Behavior: Behavior normal.         Thought Content: Thought content normal.         Judgment: Judgment normal.              Assessment and Plan      1. Medicare annual wellness visit, subsequent year  - Upon arrival to the room the patient underwent the Medicare health risk assessment.  Neither the questions themselves or the answers that were given prompted any major concern on the part of the patient or by the medical staff that gave the assessment.  As far as the preventative care examinations and the preventative care immunizations that this patient requires they are as listed below.   Screening tests recommended:    Colonoscopy -utd  Mammogram- utd  DEXA- utd  PAP/ Pelvic-utd  Diabetic eye exam - utd  Diabetic foot exam- utd  Dentist- 2x /year  Derm- not routine  Neurologist- MS  Immunization:  Influenza - utd  Prevnar-utd  Pneumovax-utd  Tetanus- utd  Shingles vaccine- utd  Hepatitis - utd  Covid   utd                 2. Mixed hyperlipidemia  - The patient's lipids have been elevated in the past. We are going to recheck those today at the lab. She is going to follow a low carb diet as she has been for the past several years and then we will make decisions about whether or not lipids need to be treated anymore aggressively after we get the results back.    3. Hypertension  - The patient's blood pressure was 118/56 mm/Hg. She takes hydrochlorothiazide and metoprolol XL and she has excellent readings. We will continue these medicines.    4. Type 2  diabetes  - The patient takes metformin along with glimepiride and we are going to check her A1c today on 05/26/2023 and make adjustments if we need to in her medications. She follows a low carb diet.    5. Gastroesophageal reflux with previous history of esophagitis  - The patient takes Protonix 40 mg a day and this seems to control her symptoms quite well.    6. Demyelinating disease of the central nervous system  - The patient has felt to have probably had MS her whole life, although it has been a very strange course. When she was a youngster, maybe 25 to 30 years ago, she had optic neuritis which is associated with multiple sclerosis. She had no other symptoms until last year when she had a CT scan that showed demyelinating lesion that initially was thought to be a tumor. That was biopsied and turned out to be nothing but a patch of tissue that was demyelinated and there was no tumor whatsoever in the lesion. The patient is doing well with no symptoms of MS at this time and she is being watched carefully.        Follow Up Return in about 6 months (around 11/26/2023), or if symptoms worsen or fail to improve, for Recheck- 6mos.       MCW again 1 year.  Patient was given instructions and counseling regarding her condition or for health maintenance advice. Please see specific information pulled into the AVS if appropriate.           Transcribed from ambient dictation for Abiodun Busch MD by Maria Isabel Ruiz.  05/26/23   16:36 EDT

## 2023-05-27 LAB
25(OH)D3 SERPL-MCNC: 53 NG/ML (ref 30–100)
ALBUMIN SERPL-MCNC: 5 G/DL (ref 3.5–5.2)
ALBUMIN/GLOB SERPL: 2 G/DL
ALP SERPL-CCNC: 83 U/L (ref 39–117)
ALT SERPL W P-5'-P-CCNC: 26 U/L (ref 1–33)
ANION GAP SERPL CALCULATED.3IONS-SCNC: 8 MMOL/L (ref 5–15)
AST SERPL-CCNC: 18 U/L (ref 1–32)
BACTERIA UR QL AUTO: ABNORMAL /HPF
BASOPHILS # BLD AUTO: 0.05 10*3/MM3 (ref 0–0.2)
BASOPHILS NFR BLD AUTO: 0.8 % (ref 0–1.5)
BILIRUB SERPL-MCNC: 0.4 MG/DL (ref 0–1.2)
BILIRUB UR QL STRIP: NEGATIVE
BUN SERPL-MCNC: 29 MG/DL (ref 8–23)
BUN/CREAT SERPL: 27.6 (ref 7–25)
CALCIUM SPEC-SCNC: 9.7 MG/DL (ref 8.6–10.5)
CHLORIDE SERPL-SCNC: 101 MMOL/L (ref 98–107)
CHOLEST SERPL-MCNC: 141 MG/DL (ref 0–200)
CLARITY UR: ABNORMAL
CO2 SERPL-SCNC: 33 MMOL/L (ref 22–29)
COLOR UR: YELLOW
CREAT SERPL-MCNC: 1.05 MG/DL (ref 0.57–1)
DEPRECATED RDW RBC AUTO: 47 FL (ref 37–54)
EGFRCR SERPLBLD CKD-EPI 2021: 55.5 ML/MIN/1.73
EOSINOPHIL # BLD AUTO: 0.11 10*3/MM3 (ref 0–0.4)
EOSINOPHIL NFR BLD AUTO: 1.9 % (ref 0.3–6.2)
ERYTHROCYTE [DISTWIDTH] IN BLOOD BY AUTOMATED COUNT: 13.6 % (ref 12.3–15.4)
GLOBULIN UR ELPH-MCNC: 2.5 GM/DL
GLUCOSE SERPL-MCNC: 81 MG/DL (ref 65–99)
GLUCOSE UR STRIP-MCNC: NEGATIVE MG/DL
HBA1C MFR BLD: 5.7 % (ref 4.8–5.6)
HCT VFR BLD AUTO: 40.4 % (ref 34–46.6)
HDLC SERPL-MCNC: 36 MG/DL (ref 40–60)
HGB BLD-MCNC: 13.3 G/DL (ref 12–15.9)
HGB UR QL STRIP.AUTO: NEGATIVE
HYALINE CASTS UR QL AUTO: ABNORMAL /LPF
IMM GRANULOCYTES # BLD AUTO: 0.03 10*3/MM3 (ref 0–0.05)
IMM GRANULOCYTES NFR BLD AUTO: 0.5 % (ref 0–0.5)
KETONES UR QL STRIP: ABNORMAL
LDLC SERPL CALC-MCNC: 60 MG/DL (ref 0–100)
LDLC/HDLC SERPL: 1.34 {RATIO}
LEUKOCYTE ESTERASE UR QL STRIP.AUTO: ABNORMAL
LYMPHOCYTES # BLD AUTO: 1.84 10*3/MM3 (ref 0.7–3.1)
LYMPHOCYTES NFR BLD AUTO: 31.2 % (ref 19.6–45.3)
MCH RBC QN AUTO: 31.1 PG (ref 26.6–33)
MCHC RBC AUTO-ENTMCNC: 32.9 G/DL (ref 31.5–35.7)
MCV RBC AUTO: 94.6 FL (ref 79–97)
MONOCYTES # BLD AUTO: 0.49 10*3/MM3 (ref 0.1–0.9)
MONOCYTES NFR BLD AUTO: 8.3 % (ref 5–12)
NEUTROPHILS NFR BLD AUTO: 3.37 10*3/MM3 (ref 1.7–7)
NEUTROPHILS NFR BLD AUTO: 57.3 % (ref 42.7–76)
NITRITE UR QL STRIP: NEGATIVE
NRBC BLD AUTO-RTO: 0 /100 WBC (ref 0–0.2)
PH UR STRIP.AUTO: 6 [PH] (ref 5–8)
PLATELET # BLD AUTO: 215 10*3/MM3 (ref 140–450)
PMV BLD AUTO: 10.6 FL (ref 6–12)
POTASSIUM SERPL-SCNC: 4.6 MMOL/L (ref 3.5–5.2)
PROT SERPL-MCNC: 7.5 G/DL (ref 6–8.5)
PROT UR QL STRIP: NEGATIVE
RBC # BLD AUTO: 4.27 10*6/MM3 (ref 3.77–5.28)
RBC # UR STRIP: ABNORMAL /HPF
REF LAB TEST METHOD: ABNORMAL
SODIUM SERPL-SCNC: 142 MMOL/L (ref 136–145)
SP GR UR STRIP: 1.02 (ref 1–1.03)
SQUAMOUS #/AREA URNS HPF: ABNORMAL /HPF
T4 FREE SERPL-MCNC: 1.01 NG/DL (ref 0.93–1.7)
TRIGL SERPL-MCNC: 283 MG/DL (ref 0–150)
TSH SERPL DL<=0.05 MIU/L-ACNC: 3.54 UIU/ML (ref 0.27–4.2)
UROBILINOGEN UR QL STRIP: ABNORMAL
VIT B12 BLD-MCNC: 666 PG/ML (ref 211–946)
VLDLC SERPL-MCNC: 45 MG/DL (ref 5–40)
WBC # UR STRIP: ABNORMAL /HPF
WBC NRBC COR # BLD: 5.89 10*3/MM3 (ref 3.4–10.8)

## 2023-05-29 LAB — BACTERIA SPEC AEROBE CULT: ABNORMAL

## 2023-05-29 RX ORDER — NITROFURANTOIN 25; 75 MG/1; MG/1
100 CAPSULE ORAL 2 TIMES DAILY
Qty: 14 CAPSULE | Refills: 0 | Status: SHIPPED | OUTPATIENT
Start: 2023-05-29 | End: 2023-06-05

## 2023-05-31 LAB
ALBUMIN SERPL ELPH-MCNC: 3.9 G/DL (ref 2.9–4.4)
ALBUMIN/GLOB SERPL: 1.3 {RATIO} (ref 0.7–1.7)
ALPHA1 GLOB SERPL ELPH-MCNC: 0.3 G/DL (ref 0–0.4)
ALPHA2 GLOB SERPL ELPH-MCNC: 0.9 G/DL (ref 0.4–1)
B-GLOBULIN SERPL ELPH-MCNC: 1 G/DL (ref 0.7–1.3)
GAMMA GLOB SERPL ELPH-MCNC: 1 G/DL (ref 0.4–1.8)
GLOBULIN SER CALC-MCNC: 3.1 G/DL (ref 2.2–3.9)
LABORATORY COMMENT REPORT: ABNORMAL
M PROTEIN SERPL ELPH-MCNC: 0.5 G/DL
PROT SERPL-MCNC: 7 G/DL (ref 6–8.5)

## 2023-06-13 NOTE — TELEPHONE ENCOUNTER
"Patient called back and said it was \"M Protein\".  She wants that added to her labs today at 3pm.  " Continue current treatment regimen.

## 2023-08-02 ENCOUNTER — TELEPHONE (OUTPATIENT)
Dept: FAMILY MEDICINE CLINIC | Facility: CLINIC | Age: 78
End: 2023-08-02

## 2023-08-02 RX ORDER — GUAIFENESIN 600 MG/1
1200 TABLET, EXTENDED RELEASE ORAL 2 TIMES DAILY
Qty: 40 TABLET | Refills: 2 | Status: SHIPPED | OUTPATIENT
Start: 2023-08-02 | End: 2023-08-12

## 2023-08-02 RX ORDER — BROMPHENIRAMINE MALEATE, PSEUDOEPHEDRINE HYDROCHLORIDE, AND DEXTROMETHORPHAN HYDROBROMIDE 2; 30; 10 MG/5ML; MG/5ML; MG/5ML
5-10 SYRUP ORAL 4 TIMES DAILY PRN
Qty: 240 ML | Refills: 2 | Status: SHIPPED | OUTPATIENT
Start: 2023-08-02 | End: 2023-08-12

## 2023-08-02 NOTE — TELEPHONE ENCOUNTER
Hub staff attempted to follow warm transfer process and was unsuccessful     Caller: Maylin Garcia    Relationship to patient: Self    Best call back number: [phone]     Patient is needing:     IS SICK AND NEEDING AN APPOINTMENT.  PLEASE ADVISE.

## 2023-08-02 NOTE — TELEPHONE ENCOUNTER
Spoke with patient.  Her only symptom is she is coughing up green and yellow mucus.  She had two refills on an E-mycin rx so she took the first rx then got it refilled again.  She is on the 8th day of the 2nd refill and is still coughing up green and yellow mucus.  What do you recommend patient doing?  There are no appts available with anyone this week.

## 2023-08-04 DIAGNOSIS — M19.91 PRIMARY OSTEOARTHRITIS, UNSPECIFIED SITE: ICD-10-CM

## 2023-08-05 RX ORDER — HYDROCODONE BITARTRATE AND ACETAMINOPHEN 10; 325 MG/1; MG/1
TABLET ORAL
Qty: 360 TABLET | Refills: 0 | Status: SHIPPED | OUTPATIENT
Start: 2023-08-05

## 2023-08-07 RX ORDER — ATORVASTATIN CALCIUM 10 MG/1
TABLET, FILM COATED ORAL
Qty: 90 TABLET | Refills: 3 | Status: SHIPPED | OUTPATIENT
Start: 2023-08-07

## 2023-08-14 RX ORDER — GLIMEPIRIDE 1 MG/1
TABLET ORAL
Qty: 90 TABLET | Refills: 1 | Status: SHIPPED | OUTPATIENT
Start: 2023-08-14

## 2023-08-28 DIAGNOSIS — F41.8 SITUATIONAL ANXIETY: ICD-10-CM

## 2023-08-29 RX ORDER — DIAZEPAM 10 MG/1
TABLET ORAL
Qty: 3 TABLET | Refills: 1 | Status: SHIPPED | OUTPATIENT
Start: 2023-08-29

## 2023-09-12 RX ORDER — NYSTATIN 100000 [USP'U]/G
POWDER TOPICAL
Qty: 60 G | Refills: 2 | Status: SHIPPED | OUTPATIENT
Start: 2023-09-12

## 2023-09-14 ENCOUNTER — TELEPHONE (OUTPATIENT)
Dept: FAMILY MEDICINE CLINIC | Facility: CLINIC | Age: 78
End: 2023-09-14
Payer: MEDICARE

## 2023-09-14 RX ORDER — METOPROLOL SUCCINATE 50 MG/1
TABLET, EXTENDED RELEASE ORAL
Qty: 90 TABLET | Refills: 3 | Status: SHIPPED | OUTPATIENT
Start: 2023-09-14

## 2023-09-14 RX ORDER — CYCLOBENZAPRINE HCL 10 MG
TABLET ORAL
Qty: 30 TABLET | Refills: 0 | Status: SHIPPED | OUTPATIENT
Start: 2023-09-14

## 2023-09-14 NOTE — TELEPHONE ENCOUNTER
Shine can you answer this?    ----- Message from Maylin Garcia sent at 9/12/2023  5:05 PM EDT -----  Regarding: Covid Shot  Contact: [phone]  Dear Dr. Busch,  Adrian and I got our Flu and RSV shots a few days ago. How long do you think we should wait to get the newest Covid shot? Ty and God Bless!

## 2023-10-03 RX ORDER — PANTOPRAZOLE SODIUM 40 MG/1
40 TABLET, DELAYED RELEASE ORAL DAILY
Qty: 90 TABLET | Refills: 0 | Status: SHIPPED | OUTPATIENT
Start: 2023-10-03

## 2023-11-01 DIAGNOSIS — M19.91 PRIMARY OSTEOARTHRITIS, UNSPECIFIED SITE: ICD-10-CM

## 2023-11-02 DIAGNOSIS — M54.50 CHRONIC BILATERAL LOW BACK PAIN WITHOUT SCIATICA: Primary | ICD-10-CM

## 2023-11-02 DIAGNOSIS — G89.29 CHRONIC BILATERAL LOW BACK PAIN WITHOUT SCIATICA: Primary | ICD-10-CM

## 2023-11-02 DIAGNOSIS — M19.91 PRIMARY OSTEOARTHRITIS, UNSPECIFIED SITE: ICD-10-CM

## 2023-11-02 RX ORDER — HYDROCODONE BITARTRATE AND ACETAMINOPHEN 10; 325 MG/1; MG/1
TABLET ORAL
Qty: 360 TABLET | Refills: 0 | OUTPATIENT
Start: 2023-11-02

## 2023-11-02 RX ORDER — OXYCODONE AND ACETAMINOPHEN 10; 325 MG/1; MG/1
1 TABLET ORAL EVERY 6 HOURS PRN
Qty: 120 TABLET | Refills: 0 | Status: SHIPPED | OUTPATIENT
Start: 2023-11-02 | End: 2023-12-02

## 2023-11-26 RX ORDER — AZITHROMYCIN 500 MG/1
500 TABLET, FILM COATED ORAL DAILY
Qty: 5 TABLET | Refills: 0 | Status: SHIPPED | OUTPATIENT
Start: 2023-11-26 | End: 2023-12-01

## 2023-11-30 DIAGNOSIS — M19.91 PRIMARY OSTEOARTHRITIS, UNSPECIFIED SITE: ICD-10-CM

## 2023-11-30 DIAGNOSIS — G89.29 CHRONIC BILATERAL LOW BACK PAIN WITHOUT SCIATICA: ICD-10-CM

## 2023-11-30 DIAGNOSIS — M54.50 CHRONIC BILATERAL LOW BACK PAIN WITHOUT SCIATICA: ICD-10-CM

## 2023-11-30 RX ORDER — OXYCODONE AND ACETAMINOPHEN 10; 325 MG/1; MG/1
1 TABLET ORAL EVERY 6 HOURS PRN
Qty: 120 TABLET | Refills: 0 | Status: SHIPPED | OUTPATIENT
Start: 2023-11-30 | End: 2023-12-30

## 2023-12-04 ENCOUNTER — TELEPHONE (OUTPATIENT)
Dept: FAMILY MEDICINE CLINIC | Facility: CLINIC | Age: 78
End: 2023-12-04
Payer: MEDICARE

## 2023-12-04 DIAGNOSIS — R30.0 DYSURIA: Primary | ICD-10-CM

## 2023-12-04 NOTE — TELEPHONE ENCOUNTER
Gave message to patient and put on the [facility] schedule for tomorrow.  She doesn't know what time it will be.

## 2023-12-04 NOTE — TELEPHONE ENCOUNTER
Caller: Maylin Garcia    Relationship: Self    Best call back number: [phone]    What medication are you requesting: AMOXICILLIN    What are your current symptoms: BLADDER INFECTION, CONSTANT URINATION, PRESSURE TO URINATE    How long have you been experiencing symptoms: 2 DAYS NOW    Have you had these symptoms before:    [x] Yes  [] No    Have you been treated for these symptoms before:   [x] Yes  [] No    If a prescription is needed, what is your preferred pharmacy and phone number: [id number] - [place], [place] - [address] [facility] [address] [facility] [facility] [address] - [phone]  - [phone] FX     Additional notes: SHE HAS A BAD REACTION TO CEPHALEXIN SO DO NOT PRESCRIBE THAT.

## 2023-12-05 ENCOUNTER — LAB (OUTPATIENT)
Dept: FAMILY MEDICINE CLINIC | Facility: CLINIC | Age: 78
End: 2023-12-05
Payer: MEDICARE

## 2023-12-05 DIAGNOSIS — R30.0 DYSURIA: ICD-10-CM

## 2023-12-05 PROCEDURE — 81001 URINALYSIS AUTO W/SCOPE: CPT | Performed by: FAMILY MEDICINE

## 2023-12-05 PROCEDURE — 87086 URINE CULTURE/COLONY COUNT: CPT | Performed by: FAMILY MEDICINE

## 2023-12-05 PROCEDURE — 87077 CULTURE AEROBIC IDENTIFY: CPT | Performed by: FAMILY MEDICINE

## 2023-12-05 PROCEDURE — 87186 SC STD MICRODIL/AGAR DIL: CPT

## 2023-12-06 LAB
BACTERIA UR QL AUTO: ABNORMAL /HPF
BILIRUB UR QL STRIP: NEGATIVE
CLARITY UR: ABNORMAL
COLOR UR: YELLOW
GLUCOSE UR STRIP-MCNC: NEGATIVE MG/DL
HGB UR QL STRIP.AUTO: NEGATIVE
HOLD SPECIMEN: NORMAL
HYALINE CASTS UR QL AUTO: ABNORMAL /LPF
KETONES UR QL STRIP: NEGATIVE
LEUKOCYTE ESTERASE UR QL STRIP.AUTO: ABNORMAL
NITRITE UR QL STRIP: NEGATIVE
PH UR STRIP.AUTO: 6.5 [PH] (ref 5–8)
PROT UR QL STRIP: NEGATIVE
RBC # UR STRIP: ABNORMAL /HPF
REF LAB TEST METHOD: ABNORMAL
SP GR UR STRIP: 1.02 (ref 1–1.03)
SQUAMOUS #/AREA URNS HPF: ABNORMAL /HPF
UROBILINOGEN UR QL STRIP: ABNORMAL
WBC # UR STRIP: ABNORMAL /HPF
YEAST URNS QL MICRO: ABNORMAL /HPF

## 2023-12-09 LAB — BACTERIA SPEC AEROBE CULT: ABNORMAL

## 2023-12-09 RX ORDER — NITROFURANTOIN 25; 75 MG/1; MG/1
100 CAPSULE ORAL 2 TIMES DAILY
Qty: 14 CAPSULE | Refills: 0 | Status: SHIPPED | OUTPATIENT
Start: 2023-12-09 | End: 2023-12-16

## 2023-12-09 NOTE — PROGRESS NOTES
Maylin let Mrs. Acosta know that she has a urinary tract infection And she needs to  the antibiotic I sent in for her Saturday.

## 2023-12-11 NOTE — PROGRESS NOTES
Called back patient and spoke with  to let him know there was an antibiotic sent in to the pharmacy for her uti. He expressed understanding.

## 2023-12-15 NOTE — PROGRESS NOTES
Tell Maylin that her labs look pretty good overall.  Just like with her  Adrian though I want her to use Tylenol for aches and pains and not ibuprofen or Aleve.  Both of their kidneys are showing a little signs of aging and stress so we do not want to aggravate that as they get older. yes

## 2023-12-20 RX ORDER — CYCLOBENZAPRINE HCL 10 MG
10 TABLET ORAL 3 TIMES DAILY PRN
Qty: 30 TABLET | Refills: 0 | Status: SHIPPED | OUTPATIENT
Start: 2023-12-20

## 2024-01-02 DIAGNOSIS — G89.29 CHRONIC BILATERAL LOW BACK PAIN WITHOUT SCIATICA: ICD-10-CM

## 2024-01-02 DIAGNOSIS — M19.91 PRIMARY OSTEOARTHRITIS, UNSPECIFIED SITE: ICD-10-CM

## 2024-01-02 DIAGNOSIS — M54.50 CHRONIC BILATERAL LOW BACK PAIN WITHOUT SCIATICA: ICD-10-CM

## 2024-01-02 RX ORDER — OXYCODONE AND ACETAMINOPHEN 10; 325 MG/1; MG/1
1 TABLET ORAL EVERY 6 HOURS PRN
Qty: 120 TABLET | Refills: 0 | Status: SHIPPED | OUTPATIENT
Start: 2024-01-02 | End: 2024-02-01

## 2024-01-08 RX ORDER — LISINOPRIL 40 MG/1
40 TABLET ORAL DAILY
Qty: 90 TABLET | Refills: 3 | Status: SHIPPED | OUTPATIENT
Start: 2024-01-08

## 2024-01-18 RX ORDER — AMLODIPINE BESYLATE 10 MG/1
TABLET ORAL
Qty: 90 TABLET | Refills: 1 | Status: SHIPPED | OUTPATIENT
Start: 2024-01-18

## 2024-01-25 RX ORDER — PANTOPRAZOLE SODIUM 40 MG/1
40 TABLET, DELAYED RELEASE ORAL DAILY
Qty: 90 TABLET | Refills: 3 | Status: SHIPPED | OUTPATIENT
Start: 2024-01-25

## 2024-01-27 NOTE — TELEPHONE ENCOUNTER
Hospitalist Progress Note    NAME:   Monique Goodman   : 1981   MRN: [id number]     Date/Time: 2024    Patient PCP: Andrew Rosado MD       Assessment / Plan:      Back pain possibly related to renal stones  Possible UTI   CT abdomen and pelvis-punctate nonobstructing right renal stone  Urine analysis with moderate blood, does, protein, LEC multiple bacteria  Neg urine culture  Cont ceftriaxone for now  Concerns for drug seeking behavior, will cut down dilaudid to 0.5 mg prn and will stop tmw, cont norco. Will not increase pain meds     JIGNESH  Creatinine on 12/3/2023 0.95, on admission 2.71  Recently started on diuretics, on hold now  Possibly related to diuretics/dehydration  Nephrology consulted  Avoid nephrotoxic agents and renally dose any medication  IVF held as per nephrology     Mild anemia  Iron deficiency anemia  F/up w PCP   May start iron pills on DC     History of lupus on 6-8 weekly injection therapy  Multiple rash on her bilateral lower extremity-well-demarcated at multiple stages of healing  Patient does not complain of any joint pain any photosensitive rash  Patient will need to follow-up with her rheumatologist upon discharge  Last treatment infusion was 8 weeks ago     Bilateral leg swelling  No respiratory symptoms at this moment  Patient does not have any cardiac history  Only trace edema on exam  BNP normal X2  CXR w no edema  Leg elevation     Chronic: Lupus, Garth-Danlos syndrome, pain at multiple sites, tremors, asthma  Continue patient's home medications     Medical Decision Making:   I personally reviewed labs: yes, as below   I personally reviewed imaging reports: yes, as below   Toxic drug monitoring:   Discussed case with: Pt, RN,   Code Status: Full Code       Subjective:  Assessment / Plan:      Asking for extra dilaudid   C/o leg swelling  Laying comfortably in her bed  C/o back pain, though I saw her ambulating comfortably   Discussed with RN events overnight.  SPOKE TO MIRLANDE HE GOT IT CANCELLED

## 2024-01-29 DIAGNOSIS — M19.91 PRIMARY OSTEOARTHRITIS, UNSPECIFIED SITE: ICD-10-CM

## 2024-01-29 DIAGNOSIS — G89.29 CHRONIC BILATERAL LOW BACK PAIN WITHOUT SCIATICA: ICD-10-CM

## 2024-01-29 DIAGNOSIS — M54.50 CHRONIC BILATERAL LOW BACK PAIN WITHOUT SCIATICA: ICD-10-CM

## 2024-01-30 RX ORDER — OXYCODONE AND ACETAMINOPHEN 10; 325 MG/1; MG/1
1 TABLET ORAL EVERY 6 HOURS PRN
Qty: 120 TABLET | Refills: 0 | Status: SHIPPED | OUTPATIENT
Start: 2024-01-30 | End: 2024-02-29

## 2024-02-01 ENCOUNTER — PREP FOR SURGERY (OUTPATIENT)
Dept: SURGERY | Facility: SURGERY CENTER | Age: 79
End: 2024-02-01
Payer: MEDICARE

## 2024-02-01 DIAGNOSIS — Z12.11 ENCOUNTER FOR SCREENING FOR MALIGNANT NEOPLASM OF COLON: ICD-10-CM

## 2024-02-01 DIAGNOSIS — Z86.010 HISTORY OF COLON POLYPS: Primary | ICD-10-CM

## 2024-02-01 RX ORDER — SODIUM CHLORIDE 0.9 % (FLUSH) 0.9 %
3 SYRINGE (ML) INJECTION EVERY 12 HOURS SCHEDULED
OUTPATIENT
Start: 2024-02-01

## 2024-02-01 RX ORDER — SODIUM CHLORIDE 0.9 % (FLUSH) 0.9 %
10 SYRINGE (ML) INJECTION AS NEEDED
OUTPATIENT
Start: 2024-02-01

## 2024-02-01 RX ORDER — SODIUM CHLORIDE, SODIUM LACTATE, POTASSIUM CHLORIDE, CALCIUM CHLORIDE 600; 310; 30; 20 MG/100ML; MG/100ML; MG/100ML; MG/100ML
30 INJECTION, SOLUTION INTRAVENOUS CONTINUOUS PRN
OUTPATIENT
Start: 2024-02-01

## 2024-02-01 RX ORDER — CYCLOBENZAPRINE HCL 10 MG
10 TABLET ORAL 3 TIMES DAILY PRN
Qty: 30 TABLET | Refills: 0 | Status: SHIPPED | OUTPATIENT
Start: 2024-02-01

## 2024-02-05 RX ORDER — GLIMEPIRIDE 1 MG/1
1 TABLET ORAL
Qty: 90 TABLET | Refills: 1 | Status: SHIPPED | OUTPATIENT
Start: 2024-02-05

## 2024-02-05 NOTE — TELEPHONE ENCOUNTER
Caller: Daveyury Maylin SANJIV    Relationship: Self    Best call back number:     [phone] (Home)     Requested Prescriptions:   Requested Prescriptions     Pending Prescriptions Disp Refills    glimepiride (AMARYL) 1 MG tablet 90 tablet 1     Sig: Take 1 tablet by mouth Every Morning Before Breakfast.   PATIENT STATES SHE IS SUPPOSED TO BE ON 2 TABLETS  A DAY PER DR PUENTE'S INSTRUCTIONS ON LAST VISIT     Pharmacy where request should be sent: [facility] - [address] [phone] [facility] [phone]      Last office visit with prescribing clinician: 5/26/2023   Last telemedicine visit with prescribing clinician: Visit date not found   Next office visit with prescribing clinician: 3/18/2024     Additional details provided by patient: PATIENT STATES SHE IS SUPPOSED TO BE ON 2 TABLETS  A DAY PER DR PUENTE'S INSTRUCTIONS ON LAST VISIT- SHE IS SPECIFICALLY REQUEST 2- 1MG TABLETS SO SHE CAN TAKE IT TWICE DAILY    PLEASE SEND THIS NEW PRESCRIPTION TO MAIL ORDER PHARMACY ASAP    Does the patient have less than a 3 day supply:  [x] Yes  [] No    Francine Hanesn Rep   02/05/24 08:50 EST

## 2024-02-13 DIAGNOSIS — M19.91 PRIMARY OSTEOARTHRITIS, UNSPECIFIED SITE: ICD-10-CM

## 2024-02-15 RX ORDER — HYDROCODONE BITARTRATE AND ACETAMINOPHEN 10; 325 MG/1; MG/1
TABLET ORAL
Qty: 360 TABLET | Refills: 0 | Status: SHIPPED | OUTPATIENT
Start: 2024-02-15

## 2024-02-15 RX ORDER — HYDROCHLOROTHIAZIDE 25 MG/1
25 TABLET ORAL DAILY
Qty: 90 TABLET | Refills: 3 | Status: SHIPPED | OUTPATIENT
Start: 2024-02-15

## 2024-02-22 RX ORDER — FLUOXETINE HYDROCHLORIDE 20 MG/1
CAPSULE ORAL
Qty: 90 CAPSULE | Refills: 3 | Status: SHIPPED | OUTPATIENT
Start: 2024-02-22

## 2024-02-23 RX ORDER — ONDANSETRON 8 MG/1
8 TABLET, ORALLY DISINTEGRATING ORAL EVERY 8 HOURS PRN
Qty: 15 TABLET | Refills: 3 | Status: SHIPPED | OUTPATIENT
Start: 2024-02-23

## 2024-03-14 ENCOUNTER — TELEPHONE (OUTPATIENT)
Dept: FAMILY MEDICINE CLINIC | Facility: CLINIC | Age: 79
End: 2024-03-14
Payer: MEDICARE

## 2024-03-14 DIAGNOSIS — Z12.31 SCREENING MAMMOGRAM FOR BREAST CANCER: Primary | ICD-10-CM

## 2024-03-14 DIAGNOSIS — Z01.419 VISIT FOR PELVIC EXAM: ICD-10-CM

## 2024-03-14 NOTE — TELEPHONE ENCOUNTER
Jamilah Carlisle we sent order in for mammogram at [facility] and referral for Dr Edward to see for GYN visit.

## 2024-04-04 DIAGNOSIS — S16.1XXD STRAIN OF NECK MUSCLE, SUBSEQUENT ENCOUNTER: Primary | ICD-10-CM

## 2024-04-04 DIAGNOSIS — M54.50 CHRONIC BILATERAL LOW BACK PAIN WITHOUT SCIATICA: ICD-10-CM

## 2024-04-04 DIAGNOSIS — G89.29 CHRONIC BILATERAL LOW BACK PAIN WITHOUT SCIATICA: ICD-10-CM

## 2024-04-04 RX ORDER — CARISOPRODOL 250 MG/1
250 TABLET ORAL 2 TIMES DAILY PRN
Qty: 60 TABLET | Refills: 0 | Status: SHIPPED | OUTPATIENT
Start: 2024-04-04

## 2024-05-01 DIAGNOSIS — M19.91 PRIMARY OSTEOARTHRITIS, UNSPECIFIED SITE: ICD-10-CM

## 2024-05-02 RX ORDER — HYDROCODONE BITARTRATE AND ACETAMINOPHEN 10; 325 MG/1; MG/1
TABLET ORAL
Qty: 360 TABLET | Refills: 0 | Status: SHIPPED | OUTPATIENT
Start: 2024-05-02

## 2024-05-07 ENCOUNTER — HOSPITAL ENCOUNTER (OUTPATIENT)
Dept: MAMMOGRAPHY | Facility: HOSPITAL | Age: 79
Discharge: HOME | End: 2024-05-07
Admitting: FAMILY MEDICINE
Payer: MEDICARE

## 2024-05-07 DIAGNOSIS — Z12.31 SCREENING MAMMOGRAM FOR BREAST CANCER: ICD-10-CM

## 2024-05-07 PROCEDURE — 77063 BREAST TOMOSYNTHESIS BI: CPT

## 2024-05-07 PROCEDURE — 77067 SCR MAMMO BI INCL CAD: CPT

## 2024-05-31 ENCOUNTER — OFFICE VISIT (OUTPATIENT)
Dept: FAMILY MEDICINE CLINIC | Facility: CLINIC | Age: 79
End: 2024-05-31
Payer: MEDICARE

## 2024-05-31 ENCOUNTER — LAB (OUTPATIENT)
Dept: FAMILY MEDICINE CLINIC | Facility: CLINIC | Age: 79
End: 2024-05-31
Payer: MEDICARE

## 2024-05-31 VITALS
WEIGHT: 203 LBS | OXYGEN SATURATION: 96 % | DIASTOLIC BLOOD PRESSURE: 68 MMHG | RESPIRATION RATE: 16 BRPM | SYSTOLIC BLOOD PRESSURE: 124 MMHG | BODY MASS INDEX: 32.62 KG/M2 | HEIGHT: 66 IN | HEART RATE: 57 BPM

## 2024-05-31 DIAGNOSIS — G35 MULTIPLE SCLEROSIS: Primary | ICD-10-CM

## 2024-05-31 DIAGNOSIS — Z13.29 THYROID DISORDER SCREEN: ICD-10-CM

## 2024-05-31 DIAGNOSIS — I10 PRIMARY HYPERTENSION: ICD-10-CM

## 2024-05-31 DIAGNOSIS — E78.5 HYPERLIPIDEMIA, UNSPECIFIED HYPERLIPIDEMIA TYPE: ICD-10-CM

## 2024-05-31 DIAGNOSIS — E11.9 TYPE 2 DIABETES MELLITUS WITHOUT COMPLICATION, UNSPECIFIED WHETHER LONG TERM INSULIN USE: ICD-10-CM

## 2024-05-31 DIAGNOSIS — E55.9 VITAMIN D DEFICIENCY: ICD-10-CM

## 2024-05-31 DIAGNOSIS — M85.9 DISORDER OF BONE DENSITY AND STRUCTURE, UNSPECIFIED: ICD-10-CM

## 2024-05-31 DIAGNOSIS — B37.2 MONILIAL INTERTRIGO: ICD-10-CM

## 2024-05-31 DIAGNOSIS — M19.91 PRIMARY OSTEOARTHRITIS, UNSPECIFIED SITE: ICD-10-CM

## 2024-05-31 DIAGNOSIS — Z96.642 HISTORY OF TOTAL HIP REPLACEMENT, LEFT: ICD-10-CM

## 2024-05-31 DIAGNOSIS — E53.8 VITAMIN B12 DEFICIENCY: ICD-10-CM

## 2024-05-31 DIAGNOSIS — Z00.00 MEDICARE ANNUAL WELLNESS VISIT, SUBSEQUENT: ICD-10-CM

## 2024-05-31 PROBLEM — D69.3 IDIOPATHIC THROMBOCYTOPENIC PURPURA: Status: RESOLVED | Noted: 2021-05-25 | Resolved: 2024-05-31

## 2024-05-31 PROBLEM — Z82.62 FAMILY HX OSTEOPOROSIS: Status: RESOLVED | Noted: 2022-05-13 | Resolved: 2024-05-31

## 2024-05-31 PROBLEM — S39.012A BACK STRAIN: Status: RESOLVED | Noted: 2023-02-22 | Resolved: 2024-05-31

## 2024-05-31 PROBLEM — N39.0 ACUTE UTI (URINARY TRACT INFECTION): Status: RESOLVED | Noted: 2022-06-06 | Resolved: 2024-05-31

## 2024-05-31 PROBLEM — R41.82 ALTERED MENTAL STATUS, UNSPECIFIED ALTERED MENTAL STATUS TYPE: Status: RESOLVED | Noted: 2022-06-06 | Resolved: 2024-05-31

## 2024-05-31 PROBLEM — G98.8 NEUROLOGICAL DISORDER: Status: RESOLVED | Noted: 2022-11-03 | Resolved: 2024-05-31

## 2024-05-31 PROBLEM — N17.9 ACUTE RENAL INJURY: Status: RESOLVED | Noted: 2022-11-03 | Resolved: 2024-05-31

## 2024-05-31 PROBLEM — K21.9 GASTROESOPHAGEAL REFLUX DISEASE: Status: RESOLVED | Noted: 2021-05-25 | Resolved: 2024-05-31

## 2024-05-31 PROBLEM — N39.0 ACUTE UTI: Status: RESOLVED | Noted: 2022-06-06 | Resolved: 2024-05-31

## 2024-05-31 LAB
HBA1C MFR BLD: 5.98 % (ref 4.8–5.6)
HOLD SPECIMEN: NORMAL
TSH SERPL DL<=0.05 MIU/L-ACNC: 1.2 UIU/ML (ref 0.27–4.2)

## 2024-05-31 PROCEDURE — 1159F MED LIST DOCD IN RCRD: CPT | Performed by: FAMILY MEDICINE

## 2024-05-31 PROCEDURE — 82570 ASSAY OF URINE CREATININE: CPT | Performed by: FAMILY MEDICINE

## 2024-05-31 PROCEDURE — 87186 SC STD MICRODIL/AGAR DIL: CPT | Performed by: FAMILY MEDICINE

## 2024-05-31 PROCEDURE — 99214 OFFICE O/P EST MOD 30 MIN: CPT | Performed by: FAMILY MEDICINE

## 2024-05-31 PROCEDURE — 1160F RVW MEDS BY RX/DR IN RCRD: CPT | Performed by: FAMILY MEDICINE

## 2024-05-31 PROCEDURE — 80061 LIPID PANEL: CPT | Performed by: FAMILY MEDICINE

## 2024-05-31 PROCEDURE — 82306 VITAMIN D 25 HYDROXY: CPT | Performed by: FAMILY MEDICINE

## 2024-05-31 PROCEDURE — 82043 UR ALBUMIN QUANTITATIVE: CPT | Performed by: FAMILY MEDICINE

## 2024-05-31 PROCEDURE — 87086 URINE CULTURE/COLONY COUNT: CPT | Performed by: FAMILY MEDICINE

## 2024-05-31 PROCEDURE — 80053 COMPREHEN METABOLIC PANEL: CPT | Performed by: FAMILY MEDICINE

## 2024-05-31 PROCEDURE — 1170F FXNL STATUS ASSESSED: CPT | Performed by: FAMILY MEDICINE

## 2024-05-31 PROCEDURE — 36415 COLL VENOUS BLD VENIPUNCTURE: CPT | Performed by: FAMILY MEDICINE

## 2024-05-31 PROCEDURE — 82607 VITAMIN B-12: CPT | Performed by: FAMILY MEDICINE

## 2024-05-31 PROCEDURE — 84165 PROTEIN E-PHORESIS SERUM: CPT | Performed by: FAMILY MEDICINE

## 2024-05-31 PROCEDURE — 83036 HEMOGLOBIN GLYCOSYLATED A1C: CPT | Performed by: FAMILY MEDICINE

## 2024-05-31 PROCEDURE — 3078F DIAST BP <80 MM HG: CPT | Performed by: FAMILY MEDICINE

## 2024-05-31 PROCEDURE — G0439 PPPS, SUBSEQ VISIT: HCPCS | Performed by: FAMILY MEDICINE

## 2024-05-31 PROCEDURE — 81001 URINALYSIS AUTO W/SCOPE: CPT | Performed by: FAMILY MEDICINE

## 2024-05-31 PROCEDURE — 87077 CULTURE AEROBIC IDENTIFY: CPT | Performed by: FAMILY MEDICINE

## 2024-05-31 PROCEDURE — 84443 ASSAY THYROID STIM HORMONE: CPT | Performed by: FAMILY MEDICINE

## 2024-05-31 PROCEDURE — 85025 COMPLETE CBC W/AUTO DIFF WBC: CPT | Performed by: FAMILY MEDICINE

## 2024-05-31 PROCEDURE — 3074F SYST BP LT 130 MM HG: CPT | Performed by: FAMILY MEDICINE

## 2024-05-31 PROCEDURE — 1125F AMNT PAIN NOTED PAIN PRSNT: CPT | Performed by: FAMILY MEDICINE

## 2024-05-31 RX ORDER — NYSTATIN 100000 [USP'U]/G
POWDER TOPICAL 3 TIMES DAILY
Qty: 60 G | Refills: 2 | Status: SHIPPED | OUTPATIENT
Start: 2024-05-31

## 2024-05-31 RX ORDER — ONDANSETRON 8 MG/1
8 TABLET, ORALLY DISINTEGRATING ORAL EVERY 8 HOURS PRN
Qty: 15 TABLET | Refills: 3 | Status: SHIPPED | OUTPATIENT
Start: 2024-05-31

## 2024-05-31 RX ORDER — THIAMINE HCL 100 MG
TABLET ORAL DAILY
COMMUNITY

## 2024-05-31 RX ORDER — CHLORAL HYDRATE 500 MG
1300 CAPSULE ORAL 2 TIMES DAILY WITH MEALS
COMMUNITY

## 2024-05-31 NOTE — PROGRESS NOTES
The ABCs of the Annual Wellness Visit  Subsequent Medicare Wellness Visit    Subjective    Maylin Garcia is a 78 y.o. female who presents for a Subsequent Medicare Wellness Visit.    The following portions of the patient's history were reviewed and   updated as appropriate: allergies, current medications, past family history, past medical history, past social history, past surgical history, and problem list.    Compared to one year ago, the patient feels her physical   health is better.    Compared to one year ago, the patient feels her mental   health is better.    Recent Hospitalizations:  She was not admitted to the hospital during the last year.       Current Medical Providers:  Patient Care Team:  Abiodun Busch MD as PCP - General (Family Medicine)    Outpatient Medications Prior to Visit   Medication Sig Dispense Refill    amLODIPine (NORVASC) 10 MG tablet TAKE 1 TABLET DAILY 90 tablet 1    atorvastatin (LIPITOR) 10 MG tablet TAKE 1 TABLET DAILY 90 tablet 3    Calcium Citrate-Vitamin D3 (CITRACAL) 315-6.25 MG-MCG tablet tablet Take  by mouth Daily.      carisoprodol (Soma) 250 MG tablet Take 1 tablet by mouth 2 (Two) Times a Day As Needed for Muscle Spasms. 60 tablet 0    CRANBERRY EXTRACT PO Take  by mouth.      D-Mannose 500 MG capsule Take 2 capsules by mouth Daily.      diazePAM (VALIUM) 10 MG tablet TAKE 1 TABLET 3 TO 4 HOURS PRIOR TO PROCEDURE. TAKE SECOND TABLET ON ARRIVAL TO HOSPITAL. TAKE 3RD TABLET ON LY IF NEEDED AT TIME OF 3 tablet 1    FLUoxetine (PROzac) 20 MG capsule TAKE 1 CAPSULE DAILY 90 capsule 3    glimepiride (AMARYL) 1 MG tablet Take 1 tablet by mouth Every Morning Before Breakfast. 90 tablet 1    hydroCHLOROthiazide 25 MG tablet TAKE 1 TABLET DAILY 90 tablet 3    HYDROcodone-acetaminophen (NORCO)  MG per tablet Take 1 tbalet every 4-6 hours MAX 4 tablets per day 360 tablet 0    lisinopril (PRINIVIL,ZESTRIL) 40 MG tablet TAKE 1 TABLET DAILY 90 tablet 3    metFORMIN  (GLUCOPHAGE) 500 MG tablet TAKE 2 TABLETS TWICE A  tablet 3    metoprolol succinate XL (TOPROL-XL) 50 MG 24 hr tablet TAKE 1 TABLET DAILY 90 tablet 3    Multiple Vitamin (ONE-A-DAY ESSENTIAL) tablet Take  by mouth.      nystatin (MYCOSTATIN) 369125 UNIT/GM powder APPLY TOPICALLY TO THE AFFECTED AREA(S) THREE TIMES DAILY FOR 30 DAYS 60 g 2    Omega-3 Fatty Acids (fish oil) 1000 MG capsule capsule Take 1,300 mg by mouth 2 (Two) Times a Day With Meals.      ondansetron ODT (ZOFRAN-ODT) 8 MG disintegrating tablet Place 1 tablet on the tongue Every 8 (Eight) Hours As Needed for Nausea or Vomiting for up to 15 doses. 15 tablet 3    pantoprazole (PROTONIX) 40 MG EC tablet TAKE 1 TABLET DAILY 90 tablet 3     No facility-administered medications prior to visit.       Opioid medication/s are on active medication list.  and I have evaluated her active treatment plan and pain score trends (see table).  There were no vitals filed for this visit.  I have reviewed the chart for potential of high risk medication and harmful drug interactions in the elderly.          Aspirin is not on active medication list.  Aspirin use is not indicated based on review of current medical condition/s. Risk of harm outweighs potential benefits.  .    Patient Active Problem List   Diagnosis    Asthmatic bronchitis    Low back pain    Hyperlipidemia    Hypertension    Osteoarthritis    Overweight    Type 2 diabetes mellitus    Thyroid disorder screen    Vitamin D deficiency    Calculus of kidney    Cataract    Depressive disorder    Polyp of colon    Chest pain    History of total hip replacement, left    Carpal tunnel syndrome on both sides    Combined form of senile cataract    Disorder of bone density and structure, unspecified    Carbuncle    Anemia    Abscess    Functional heart murmur    Monilial intertrigo    Multiple sclerosis    Vitamin B12 deficiency     Advance Care Planning   Advance Care Planning     Advance Directive is on file.   "ACP discussion was held with the patient during this visit. Patient has an advance directive in EMR which is still valid.      Objective    Vitals:    24 1447   BP: 124/68   Pulse: 57   Resp: 16   SpO2: 96%   Weight: 92.1 kg (203 lb)   Height: 167.6 cm (66\")     Estimated body mass index is 32.77 kg/m² as calculated from the following:    Height as of this encounter: 167.6 cm (66\").    Weight as of this encounter: 92.1 kg (203 lb).    BMI is >= 30 and <35. (Class 1 Obesity). The following options were offered after discussion;: weight loss educational material (shared in after visit summary), exercise counseling/recommendations, and nutrition counseling/recommendations      Does the patient have evidence of cognitive impairment? No          HEALTH RISK ASSESSMENT    Smoking Status:  Social History     Tobacco Use   Smoking Status Former    Current packs/day: 0.00    Average packs/day: 1.5 packs/day for 15.0 years (22.5 ttl pk-yrs)    Types: Cigarettes    Start date: 1967    Quit date: 10/31/1981    Years since quittin.6   Smokeless Tobacco Never   Tobacco Comments    Quit smoking 42 years ago     Alcohol Consumption:  Social History     Substance and Sexual Activity   Alcohol Use Never     Fall Risk Screen:    ELAINA Fall Risk Assessment was completed, and patient is at MODERATE risk for falls. Assessment completed on:2024    Depression Screenin/31/2024     2:46 PM   PHQ-2/PHQ-9 Depression Screening   Little Interest or Pleasure in Doing Things 0-->not at all   Feeling Down, Depressed or Hopeless 0-->not at all   PHQ-9: Brief Depression Severity Measure Score 0       Health Habits and Functional and Cognitive Screenin/31/2024     2:44 PM   Functional & Cognitive Status   Do you have difficulty preparing food and eating? No   Do you have difficulty bathing yourself, getting dressed or grooming yourself? No   Do you have difficulty using the toilet? No   Do you have difficulty " moving around from place to place? No   Do you have trouble with steps or getting out of a bed or a chair? No   Current Diet Well Balanced Diet   Dental Exam Up to date   Eye Exam Up to date   Exercise (times per week) 0 times per week   Current Exercises Include No Regular Exercise   Do you need help using the phone?  No   Are you deaf or do you have serious difficulty hearing?  No   Do you need help to go to places out of walking distance? No   Do you need help shopping? No   Do you need help preparing meals?  No   Do you need help with housework?  Yes   Do you need help with laundry? No   Do you need help taking your medications? No   Do you need help managing money? No   Do you ever drive or ride in a car without wearing a seat belt? No   Have you felt unusual stress, anger or loneliness in the last month? Yes   Who do you live with? Spouse   If you need help, do you have trouble finding someone available to you? No   Have you been bothered in the last four weeks by sexual problems? No   Do you have difficulty concentrating, remembering or making decisions? No       Age-appropriate Screening Schedule:  Refer to the list below for future screening recommendations based on patient's age, sex and/or medical conditions. Orders for these recommended tests are listed in the plan section. The patient has been provided with a written plan.    Health Maintenance   Topic Date Due    RSV Vaccine - Adults (1 - 1-dose 60+ series) Never done    URINE MICROALBUMIN  03/10/2023    COVID-19 Vaccine (6 - 2023-24 season) 09/01/2023    HEMOGLOBIN A1C  11/26/2023    DXA SCAN  04/13/2024    ANNUAL WELLNESS VISIT  05/26/2024    LIPID PANEL  05/26/2024    INFLUENZA VACCINE  08/01/2024    DIABETIC EYE EXAM  03/29/2025    BMI FOLLOWUP  05/31/2025    TDAP/TD VACCINES (3 - Td or Tdap) 11/15/2031    HEPATITIS C SCREENING  Completed    Pneumococcal Vaccine 65+  Completed    ZOSTER VACCINE  Completed    COLORECTAL CANCER SCREENING   Discontinued                  CMS Preventative Services Quick Reference  Risk Factors Identified During Encounter  None Identified  The above risks/problems have been discussed with the patient.  Pertinent information has been shared with the patient in the After Visit Summary.  An After Visit Summary and PPPS were made available to the patient.    Follow Up:   Next Medicare Wellness visit to be scheduled in 1 year.       Additional E&M Note during same encounter follows:  Patient has multiple medical problems which are significant and separately identifiable that require additional work above and beyond the Medicare Wellness Visit.      Chief Complaint  Medicare Wellness-subsequent    Subjective        History of Present Illness  The patient is a 77-year-old white female here today for her Medicare wellness visit.    The patient's neurologist, Dr. Tono Cazares, is contemplating an M-spike blood work to monitor her condition. Despite the absence of any brain abnormalities, the diagnosis of Multiple Sclerosis is confirmed. She reports no relapses.    The patient continues to experience nausea, for which she uses nystatin powder. She maintains an active lifestyle, engaging in daily gym visits. She reports no new symptoms apart from severe arthritis, which occasionally causes discomfort. Although her hand pain is less severe than a year ago, it has shown improvement. She also experiences arthritis in her back. She has previously found relief from Voltaren.    The patient reports a scabbed blood vessel on her stomach, which she believes is from a rash in the folds. She suspects a blood blister from attempting to scratch the dry skin.    The patient's blood pressure is well-regulated, registering at 124/70. She reports no issues with platelet counts and continues to take iron supplements twice daily. Her cataracts are well-managed and she is capable of driving. She denies experiencing stomach reflux. Her colonoscopy  "is up-to-date, with 1 or 2 polyps and is due for another next year. She underwent a mammogram a few weeks ago, which yielded normal results. She underwent a DEXA scan approximately a year ago, which revealed a mild osteopenia. She takes 2 calcium plus D3 supplements daily, totaling 1100. She undergoes annual eye exams. She consulted a podiatrist, Dr. Bay, a few weeks ago for improved neuropathy in her feet. She visits her dentist regularly and visited a dermatologist as needed. She has received an influenza vaccine annually and is uncertain about the necessity of a COVID-19 vaccine. She has received the RSV vaccine. She reports no hearing issues.   Her father had colon cancer. Her mother had severe arthritis.         Objective   Vital Signs:  /68   Pulse 57   Resp 16   Ht 167.6 cm (66\")   Wt 92.1 kg (203 lb)   SpO2 96%   BMI 32.77 kg/m²     Physical Exam  Constitutional:       Appearance: Normal appearance. She is well-developed and normal weight.   HENT:      Head: Normocephalic and atraumatic.      Right Ear: Tympanic membrane, ear canal and external ear normal.      Left Ear: Tympanic membrane, ear canal and external ear normal.      Nose: Nose normal.      Mouth/Throat:      Mouth: Mucous membranes are moist.      Pharynx: Oropharynx is clear. No oropharyngeal exudate.   Eyes:      Extraocular Movements: Extraocular movements intact.      Conjunctiva/sclera: Conjunctivae normal.      Pupils: Pupils are equal, round, and reactive to light.   Cardiovascular:      Rate and Rhythm: Normal rate and regular rhythm.      Pulses: Normal pulses.      Heart sounds: Normal heart sounds.   Pulmonary:      Effort: Pulmonary effort is normal.      Breath sounds: Normal breath sounds.   Abdominal:      General: Bowel sounds are normal.      Palpations: Abdomen is soft.   Musculoskeletal:         General: Normal range of motion.      Cervical back: Normal range of motion and neck supple.   Skin:     General: " Skin is warm and dry.   Neurological:      General: No focal deficit present.      Mental Status: She is alert and oriented to person, place, and time. Mental status is at baseline.   Psychiatric:         Mood and Affect: Mood normal.         Behavior: Behavior normal.         Thought Content: Thought content normal.         Judgment: Judgment normal.                Results                 Assessment & Plan   The patient is a 78-year-old white female here today for her Medicare wellness visit.  Upon arrival to the room the patient underwent the Medicare health risk assessment.  Neither the questions themselves or the answers that were given prompted any major concern on the part of the patient or by the medical staff that gave the assessment.  As far as the preventative care examinations and the preventative care immunizations that this patient requires they are as listed below.   Screening tests recommended:    Colonoscopy -utd  Mammogram -utd  DEXA- utd  PAP/ Pelvic- utd  Diabetic eye exam- utd  Diabetic foot exam- utd  Dentist- utd  Derm- when needed  Neurologist- [facility] [place]    Immunization:  Influenza- utd  Prevnar-20  Pneumovax- utd  Tetanus- utd  Shingles vaccine-utd  Hepatitis -utd  Covid -utd  RSV  - utd               Medicare annual wellness, subsequent year.  The patient has a history of low vitamin B12 levels, which will be reassessed today. Supplements will be provided as necessary.    2. Vitamin D deficiency.  The patient has a history of a low vitamin D level and has been on vitamin D replacement therapy. The plan is to recheck her level this year to determine if continued vitamin D replacement therapy is necessary.    3. Hypertension.  The patient's blood pressure has been previously elevated. Today's blood pressure reading was 124/68. The current plan is to continue her current medication regimen, which includes metoprolol succinate 50 mg daily, lisinopril 40 mg daily,  hydrochlorothiazide 25 mg daily, and amlodipine 10 mg daily. This combination appears to be effective for her, which will be continued.    4. Thyroid disorder.  The patient has a history of low thyroid and is currently not on any thyroid replacement therapy. Her TSH and T4 levels will be checked today. If necessary, the replacement will be resumed next week.    5. Hyperlipidemia.  If the lipid panel indicates a need for assistance, a statin may be considered.    6. Type 2 diabetes.  The patient is currently on glimepiride 1 mg daily and metformin 500 mg 2 tablets twice daily. An A1c test will be conducted.    7. Multiple sclerosis.  The patient had a lesion on her brain several years ago that was biopsied and was thought to be a demyelinating process. She has not had any further new lesions, although she is felt to have multiple sclerosis. At present, she has no signs of progressive disease. We are monitoring for symptoms to progress and she will be followed by neurology if indeed progression is felt to be occurring.    8. Disorder of the bone density and structure.  The patient has had a bone density test that was suspicious for osteopenia. A repeat bone density test will be repeated to assess if the disease has progressed.    9. Primary osteoarthritis.  The patient has obvious arthritis in her hands with Heberden's nodes and nodular deformities of her DIPs, PIPs, and metacarpophalangeal joints. She actually has pretty good movement and some pain, but it is not too severe. She has had total hip replacement from arthritic wear and tear, but she has done well with that. Voltaren gel is recommended when the arthritis acts up in her fingers and wrists.    10. 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  The patient has healed well from that surgery and is doing well. The right hip seems to be functioning well.         Follow Up   Return in about 6 months (around 11/30/2024), or if symptoms worsen or fail to improve,  for Recheck-6 months                     , Medicare Wellness-1 year.  Patient was given instructions and counseling regarding her condition or for health maintenance advice. Please see specific information pulled into the AVS if appropriate.   Patient or patient representative verbalized consent for the use of Ambient Listening during the visit with  Abiodun Busch MD for chart documentation. 5/31/2024  15:32 EDT

## 2024-06-01 LAB
25(OH)D3 SERPL-MCNC: 89.5 NG/ML (ref 30–100)
ALBUMIN SERPL-MCNC: 4.9 G/DL (ref 3.5–5.2)
ALBUMIN UR-MCNC: 3.7 MG/DL
ALBUMIN/GLOB SERPL: 2 G/DL
ALP SERPL-CCNC: 64 U/L (ref 39–117)
ALT SERPL W P-5'-P-CCNC: 30 U/L (ref 1–33)
ANION GAP SERPL CALCULATED.3IONS-SCNC: 11.1 MMOL/L (ref 5–15)
AST SERPL-CCNC: 21 U/L (ref 1–32)
BACTERIA UR QL AUTO: ABNORMAL /HPF
BASOPHILS # BLD AUTO: 0.04 10*3/MM3 (ref 0–0.2)
BASOPHILS NFR BLD AUTO: 0.6 % (ref 0–1.5)
BILIRUB SERPL-MCNC: 0.5 MG/DL (ref 0–1.2)
BILIRUB UR QL STRIP: NEGATIVE
BUN SERPL-MCNC: 25 MG/DL (ref 8–23)
BUN/CREAT SERPL: 20.5 (ref 7–25)
CALCIUM SPEC-SCNC: 9.9 MG/DL (ref 8.6–10.5)
CHLORIDE SERPL-SCNC: 103 MMOL/L (ref 98–107)
CHOLEST SERPL-MCNC: 147 MG/DL (ref 0–200)
CLARITY UR: ABNORMAL
CO2 SERPL-SCNC: 28.9 MMOL/L (ref 22–29)
COLOR UR: YELLOW
CREAT SERPL-MCNC: 1.22 MG/DL (ref 0.57–1)
CREAT UR-MCNC: 110.3 MG/DL
DEPRECATED RDW RBC AUTO: 46.6 FL (ref 37–54)
EGFRCR SERPLBLD CKD-EPI 2021: 46.1 ML/MIN/1.73
EOSINOPHIL # BLD AUTO: 0.12 10*3/MM3 (ref 0–0.4)
EOSINOPHIL NFR BLD AUTO: 1.7 % (ref 0.3–6.2)
ERYTHROCYTE [DISTWIDTH] IN BLOOD BY AUTOMATED COUNT: 13.2 % (ref 12.3–15.4)
GLOBULIN UR ELPH-MCNC: 2.5 GM/DL
GLUCOSE SERPL-MCNC: 84 MG/DL (ref 65–99)
GLUCOSE UR STRIP-MCNC: NEGATIVE MG/DL
HCT VFR BLD AUTO: 41.8 % (ref 34–46.6)
HDLC SERPL-MCNC: 42 MG/DL (ref 40–60)
HGB BLD-MCNC: 13.8 G/DL (ref 12–15.9)
HGB UR QL STRIP.AUTO: NEGATIVE
HYALINE CASTS UR QL AUTO: ABNORMAL /LPF
IMM GRANULOCYTES # BLD AUTO: 0.02 10*3/MM3 (ref 0–0.05)
IMM GRANULOCYTES NFR BLD AUTO: 0.3 % (ref 0–0.5)
KETONES UR QL STRIP: NEGATIVE
LDLC SERPL CALC-MCNC: 68 MG/DL (ref 0–100)
LDLC/HDLC SERPL: 1.41 {RATIO}
LEUKOCYTE ESTERASE UR QL STRIP.AUTO: ABNORMAL
LYMPHOCYTES # BLD AUTO: 1.88 10*3/MM3 (ref 0.7–3.1)
LYMPHOCYTES NFR BLD AUTO: 26.5 % (ref 19.6–45.3)
MCH RBC QN AUTO: 31.6 PG (ref 26.6–33)
MCHC RBC AUTO-ENTMCNC: 33 G/DL (ref 31.5–35.7)
MCV RBC AUTO: 95.7 FL (ref 79–97)
MICROALBUMIN/CREAT UR: 33.5 MG/G (ref 0–29)
MONOCYTES # BLD AUTO: 0.63 10*3/MM3 (ref 0.1–0.9)
MONOCYTES NFR BLD AUTO: 8.9 % (ref 5–12)
NEUTROPHILS NFR BLD AUTO: 4.41 10*3/MM3 (ref 1.7–7)
NEUTROPHILS NFR BLD AUTO: 62 % (ref 42.7–76)
NITRITE UR QL STRIP: POSITIVE
NRBC BLD AUTO-RTO: 0 /100 WBC (ref 0–0.2)
PH UR STRIP.AUTO: 5.5 [PH] (ref 5–8)
PLATELET # BLD AUTO: 211 10*3/MM3 (ref 140–450)
PMV BLD AUTO: 10.6 FL (ref 6–12)
POTASSIUM SERPL-SCNC: 4.3 MMOL/L (ref 3.5–5.2)
PROT SERPL-MCNC: 7.4 G/DL (ref 6–8.5)
PROT UR QL STRIP: ABNORMAL
RBC # BLD AUTO: 4.37 10*6/MM3 (ref 3.77–5.28)
RBC # UR STRIP: ABNORMAL /HPF
REF LAB TEST METHOD: ABNORMAL
SODIUM SERPL-SCNC: 143 MMOL/L (ref 136–145)
SP GR UR STRIP: 1.02 (ref 1–1.03)
SQUAMOUS #/AREA URNS HPF: ABNORMAL /HPF
TRIGL SERPL-MCNC: 228 MG/DL (ref 0–150)
UROBILINOGEN UR QL STRIP: ABNORMAL
VIT B12 BLD-MCNC: 715 PG/ML (ref 211–946)
VLDLC SERPL-MCNC: 37 MG/DL (ref 5–40)
WBC # UR STRIP: ABNORMAL /HPF
WBC NRBC COR # BLD AUTO: 7.1 10*3/MM3 (ref 3.4–10.8)

## 2024-06-03 LAB
ALBUMIN SERPL ELPH-MCNC: 4.2 G/DL (ref 2.9–4.4)
ALBUMIN/GLOB SERPL: 1.3 {RATIO} (ref 0.7–1.7)
ALPHA1 GLOB SERPL ELPH-MCNC: 0.2 G/DL (ref 0–0.4)
ALPHA2 GLOB SERPL ELPH-MCNC: 0.8 G/DL (ref 0.4–1)
B-GLOBULIN SERPL ELPH-MCNC: 1.1 G/DL (ref 0.7–1.3)
BACTERIA SPEC AEROBE CULT: ABNORMAL
GAMMA GLOB SERPL ELPH-MCNC: 1.1 G/DL (ref 0.4–1.8)
GLOBULIN SER CALC-MCNC: 3.2 G/DL (ref 2.2–3.9)
LABORATORY COMMENT REPORT: ABNORMAL
M PROTEIN SERPL ELPH-MCNC: 0.6 G/DL
PROT SERPL-MCNC: 7.4 G/DL (ref 6–8.5)

## 2024-06-03 RX ORDER — LEVOFLOXACIN 250 MG/1
250 TABLET, FILM COATED ORAL DAILY
Qty: 7 TABLET | Refills: 1 | Status: SHIPPED | OUTPATIENT
Start: 2024-06-03

## 2024-06-04 DIAGNOSIS — S60.929A: Primary | ICD-10-CM

## 2024-06-04 DIAGNOSIS — L08.9: Primary | ICD-10-CM

## 2024-06-04 RX ORDER — CLINDAMYCIN HYDROCHLORIDE 300 MG/1
300 CAPSULE ORAL 3 TIMES DAILY
Qty: 21 CAPSULE | Refills: 0 | Status: SHIPPED | OUTPATIENT
Start: 2024-06-04 | End: 2024-06-08

## 2024-06-04 NOTE — PROGRESS NOTES
Maylin tell Maylin that I have called in Levaquin and antibiotic for her to take 1 a day for a week.  She has a urinary tract infection that we need to treat.  So far everything else on her labs is unremarkable

## 2024-06-05 ENCOUNTER — LAB (OUTPATIENT)
Dept: FAMILY MEDICINE CLINIC | Facility: CLINIC | Age: 79
End: 2024-06-05
Payer: MEDICARE

## 2024-06-05 ENCOUNTER — OFFICE VISIT (OUTPATIENT)
Dept: FAMILY MEDICINE CLINIC | Facility: CLINIC | Age: 79
End: 2024-06-05
Payer: MEDICARE

## 2024-06-05 VITALS
DIASTOLIC BLOOD PRESSURE: 64 MMHG | BODY MASS INDEX: 32.62 KG/M2 | HEART RATE: 62 BPM | WEIGHT: 203 LBS | SYSTOLIC BLOOD PRESSURE: 120 MMHG | RESPIRATION RATE: 16 BRPM | OXYGEN SATURATION: 94 % | HEIGHT: 66 IN

## 2024-06-05 DIAGNOSIS — H61.23 BILATERAL IMPACTED CERUMEN: ICD-10-CM

## 2024-06-05 DIAGNOSIS — G35 MULTIPLE SCLEROSIS: ICD-10-CM

## 2024-06-05 DIAGNOSIS — L08.9 FINGER INFECTION: Primary | ICD-10-CM

## 2024-06-05 PROCEDURE — 26010 DRAINAGE OF FINGER ABSCESS: CPT | Performed by: FAMILY MEDICINE

## 2024-06-05 PROCEDURE — 69209 REMOVE IMPACTED EAR WAX UNI: CPT | Performed by: FAMILY MEDICINE

## 2024-06-05 PROCEDURE — 87070 CULTURE OTHR SPECIMN AEROBIC: CPT | Performed by: FAMILY MEDICINE

## 2024-06-05 PROCEDURE — 87186 SC STD MICRODIL/AGAR DIL: CPT | Performed by: FAMILY MEDICINE

## 2024-06-05 PROCEDURE — 1125F AMNT PAIN NOTED PAIN PRSNT: CPT | Performed by: FAMILY MEDICINE

## 2024-06-05 PROCEDURE — 3078F DIAST BP <80 MM HG: CPT | Performed by: FAMILY MEDICINE

## 2024-06-05 PROCEDURE — 87147 CULTURE TYPE IMMUNOLOGIC: CPT | Performed by: FAMILY MEDICINE

## 2024-06-05 PROCEDURE — 99213 OFFICE O/P EST LOW 20 MIN: CPT | Performed by: FAMILY MEDICINE

## 2024-06-05 PROCEDURE — 87205 SMEAR GRAM STAIN: CPT | Performed by: FAMILY MEDICINE

## 2024-06-05 PROCEDURE — 3074F SYST BP LT 130 MM HG: CPT | Performed by: FAMILY MEDICINE

## 2024-06-05 NOTE — PROGRESS NOTES
"Chief Complaint  Hand Pain    Subjective        Maylin Garcia presents to [facility]  History of Present Illness  The patient is a 78-year-old white female. She came in today with an infection in her right middle finger over her PIP joint.    The patient sustained a laceration to her right middle finger approximately one week prior, which initially healed. However, a few days later, she developed erythema, swelling, tenderness, and heat. A raised, red abscess over her skin over the joint was noted, prompting her visit today for further evaluation.    The patient is also suffering from a urinary tract infection and is currently on a regimen of Levaquin.    The patient is experiencing hearing impairment in one ear and is seeking an ear cleaning procedure. She reports experiencing dizziness and a sensation of impending syncope.       Objective   Vital Signs:  /64   Pulse 62   Resp 16   Ht 167.6 cm (66\")   Wt 92.1 kg (203 lb)   SpO2 94%   BMI 32.77 kg/m²   Estimated body mass index is 32.77 kg/m² as calculated from the following:    Height as of this encounter: 167.6 cm (66\").    Weight as of this encounter: 92.1 kg (203 lb).               Physical Exam  Vitals reviewed.   Constitutional:       Appearance: Normal appearance.   Musculoskeletal:         General: Deformity present.      Comments: Osteoarthritic changes in her hands  Right hand middle finger with dorsal abscess over the PIP joint.        Result Review :          Results         Incision & Drainage    Date/Time: 6/5/2024 9:09 AM    Performed by: Abiodun Busch MD  Authorized by: Abiodun Busch MD  Type: abscess  Body area: upper extremity  Location details: right long finger  Anesthesia: local infiltration    Anesthesia:  Local Anesthetic: lidocaine 1% without epinephrine  Anesthetic total: 1 mL    Sedation:  Patient sedated: no    Scalpel size: 15  Incision type: single straight  Drainage: " bloody  Drainage amount: scant  Wound treatment: wound left open  Packing material: none  Patient tolerance: patient tolerated the procedure well with no immediate complications  Comments: Culture taken of the wound drainage.  Most of it was bloody though and I am not sure much purulent material was actually mixed in.  Will see if it grows anything.  She is to finish the Levaquin.  We used a compression dressing to stop the bleeding and to let the wound heal.  She is going to leave this dressing on for about 4 to 5 days and then take the dressing off and let me know what the wound looks like.  She can send me a picture if she likes.            Assessment and Plan     Diagnoses and all orders for this visit:    1. Finger infection (Primary)  -     Wound Culture - Wound, Hand, Digit Right  -     Incision & Drainage    2. Multiple sclerosis    3. Bilateral impacted cerumen      Assessment & Plan  The patient is a 78-year-old white female who came in today with an infection in her right middle finger over her PIP joint.    1. Infection in the right middle finger.  The patient sustained a laceration to her right middle finger approximately one week prior, which initially healed. However, a few days later, she developed erythema, swelling, tenderness, and heat. A raised, red abscess over her skin over the joint was noted, prompting her visit today for further evaluation. The initial laceration had a scab over it and was leaking serosanguineous material upon local anesthesia into the skin. A small incision with a # 15 blade was inserted next to the original laceration, followed by a culture of the bloody material from the wound. The culture will be sent for culture, but due to the presence of blood, there may be minimal, if any, growth. The patient will maintain her current regimen of Levaquin for a urinary tract infection, which is also a reasonable skin infection medication. However, upon removal of the dressing this  weekend, if she has completed the Levaquin course, a switch to Cleocin may be considered. She is advised to monitor for signs of infection radiating up the arm, although this is highly unlikely.    2. Multiple sclerosis.  The patient was diagnosed with multiple sclerosis with a single brain lesion several years ago. Unfortunately, the lesion has subsided and completely disappeared, and she has had no evidence of disease since then.    3. Bilateral cerumen impaction.  The patient had cerumen impaction in both ears. Both ear canals were lavaged and clean, and the tympanic membranes appeared normal.            Follow Up     Return in about 6 months (around 12/5/2024), or if symptoms worsen or fail to improve, for Medicare Wellness.  Patient was given instructions and counseling regarding her condition or for health maintenance advice. Please see specific information pulled into the AVS if appropriate.     Patient or patient representative verbalized consent for the use of Ambient Listening during the visit with  Abiodun Busch MD for chart documentation. 6/5/2024  09:13 EDT

## 2024-06-07 ENCOUNTER — TELEPHONE (OUTPATIENT)
Dept: FAMILY MEDICINE CLINIC | Facility: CLINIC | Age: 79
End: 2024-06-07
Payer: MEDICARE

## 2024-06-07 NOTE — TELEPHONE ENCOUNTER
"Patient saw you Wednesday and Maylin cleaned out her ears. You told her that her eardrum was \"disturbed\". She cannot hear well out of that ear, she is in some pain and hears a hissing sound in that ear like the air coming out of tire. She is wanting to know if her eardrum is damaged and what she is supposed to do. She wants this message answered by only Dr Busch.  "

## 2024-06-08 LAB
BACTERIA SPEC AEROBE CULT: ABNORMAL
GRAM STN SPEC: ABNORMAL
GRAM STN SPEC: ABNORMAL

## 2024-06-08 RX ORDER — CLINDAMYCIN HYDROCHLORIDE 300 MG/1
300 CAPSULE ORAL 4 TIMES DAILY
Qty: 20 CAPSULE | Refills: 0 | Status: SHIPPED | OUTPATIENT
Start: 2024-06-08 | End: 2024-06-13

## 2024-06-08 NOTE — TELEPHONE ENCOUNTER
Sent a text over the phone to Maylin and told her to put a cotton earplug into her ear.  Take a piece of cotton about the size of her small finger and cover it with Vaseline.  Put the Vaseline covered cotton into the ear canal but do not push it down too far.  Put some Vaseline on the outside of the ear canal also.  Every day or 2 after a shower bath pull that plug out and put a new one in.

## 2024-06-21 RX ORDER — LISINOPRIL 40 MG/1
40 TABLET ORAL DAILY
Qty: 30 TABLET | Refills: 0 | Status: SHIPPED | OUTPATIENT
Start: 2024-06-21

## 2024-06-24 RX ORDER — LISINOPRIL 40 MG/1
40 TABLET ORAL DAILY
Qty: 90 TABLET | OUTPATIENT
Start: 2024-06-24

## 2024-07-04 DIAGNOSIS — M19.91 PRIMARY OSTEOARTHRITIS, UNSPECIFIED SITE: ICD-10-CM

## 2024-07-05 RX ORDER — NYSTATIN 100000 [USP'U]/G
POWDER TOPICAL 3 TIMES DAILY
Qty: 60 G | Refills: 2 | Status: SHIPPED | OUTPATIENT
Start: 2024-07-05

## 2024-07-05 RX ORDER — HYDROCODONE BITARTRATE AND ACETAMINOPHEN 10; 325 MG/1; MG/1
TABLET ORAL
Qty: 360 TABLET | Refills: 0 | Status: SHIPPED | OUTPATIENT
Start: 2024-07-05 | End: 2024-07-07 | Stop reason: SDUPTHER

## 2024-07-06 DIAGNOSIS — M19.91 PRIMARY OSTEOARTHRITIS, UNSPECIFIED SITE: ICD-10-CM

## 2024-07-06 RX ORDER — HYDROCODONE BITARTRATE AND ACETAMINOPHEN 10; 325 MG/1; MG/1
TABLET ORAL
Qty: 360 TABLET | Refills: 0 | Status: CANCELLED | OUTPATIENT
Start: 2024-07-06

## 2024-07-07 DIAGNOSIS — M19.91 PRIMARY OSTEOARTHRITIS, UNSPECIFIED SITE: ICD-10-CM

## 2024-07-07 RX ORDER — HYDROCODONE BITARTRATE AND ACETAMINOPHEN 10; 325 MG/1; MG/1
TABLET ORAL
Qty: 120 TABLET | Refills: 0 | Status: SHIPPED | OUTPATIENT
Start: 2024-07-07

## 2024-07-11 ENCOUNTER — TELEPHONE (OUTPATIENT)
Dept: FAMILY MEDICINE CLINIC | Facility: CLINIC | Age: 79
End: 2024-07-11
Payer: MEDICARE

## 2024-07-11 DIAGNOSIS — M19.91 PRIMARY OSTEOARTHRITIS, UNSPECIFIED SITE: ICD-10-CM

## 2024-07-11 NOTE — TELEPHONE ENCOUNTER
Dr. Busch sent patient's Hydrocodone rx to [facility] at  on 7/7/2024 but the pharmacy is saying they have not received it. Even told her that today.    Needs a new rx sent to [facility] at  for Hydrocodone 10/326mg one tablet every 4-6 hours MAX 4 tablets per day #120.

## 2024-07-12 RX ORDER — HYDROCODONE BITARTRATE AND ACETAMINOPHEN 10; 325 MG/1; MG/1
TABLET ORAL
Qty: 120 TABLET | Refills: 0 | Status: SHIPPED | OUTPATIENT
Start: 2024-07-12

## 2024-07-16 RX ORDER — AMLODIPINE BESYLATE 10 MG/1
TABLET ORAL
Qty: 90 TABLET | Refills: 3 | Status: SHIPPED | OUTPATIENT
Start: 2024-07-16

## 2024-08-01 RX ORDER — ATORVASTATIN CALCIUM 10 MG/1
TABLET, FILM COATED ORAL
Qty: 90 TABLET | Refills: 3 | Status: SHIPPED | OUTPATIENT
Start: 2024-08-01

## 2024-08-05 DIAGNOSIS — M19.91 PRIMARY OSTEOARTHRITIS, UNSPECIFIED SITE: ICD-10-CM

## 2024-08-06 RX ORDER — GLIMEPIRIDE 1 MG/1
1 TABLET ORAL
Qty: 90 TABLET | Refills: 3 | Status: SHIPPED | OUTPATIENT
Start: 2024-08-06

## 2024-08-06 RX ORDER — GLIMEPIRIDE 1 MG/1
1 TABLET ORAL
Qty: 90 TABLET | Refills: 1 | Status: SHIPPED | OUTPATIENT
Start: 2024-08-06

## 2024-08-06 RX ORDER — HYDROCODONE BITARTRATE AND ACETAMINOPHEN 10; 325 MG/1; MG/1
TABLET ORAL
Qty: 120 TABLET | Refills: 0 | Status: SHIPPED | OUTPATIENT
Start: 2024-08-06

## 2024-08-21 ENCOUNTER — TELEPHONE (OUTPATIENT)
Dept: FAMILY MEDICINE CLINIC | Facility: CLINIC | Age: 79
End: 2024-08-21

## 2024-08-21 RX ORDER — AMOXICILLIN 500 MG/1
1000 CAPSULE ORAL 2 TIMES DAILY
Qty: 40 CAPSULE | Refills: 0 | Status: SHIPPED | OUTPATIENT
Start: 2024-08-21 | End: 2024-08-31

## 2024-08-21 RX ORDER — BROMPHENIRAMINE MALEATE, PSEUDOEPHEDRINE HYDROCHLORIDE, AND DEXTROMETHORPHAN HYDROBROMIDE 2; 30; 10 MG/5ML; MG/5ML; MG/5ML
5-10 SYRUP ORAL 3 TIMES DAILY PRN
Qty: 240 ML | Refills: 1 | Status: SHIPPED | OUTPATIENT
Start: 2024-08-21 | End: 2024-08-31

## 2024-08-27 DIAGNOSIS — M19.91 PRIMARY OSTEOARTHRITIS, UNSPECIFIED SITE: ICD-10-CM

## 2024-08-27 RX ORDER — HYDROCODONE BITARTRATE AND ACETAMINOPHEN 10; 325 MG/1; MG/1
TABLET ORAL
Qty: 120 TABLET | Refills: 0 | Status: SHIPPED | OUTPATIENT
Start: 2024-08-27

## 2024-09-03 ENCOUNTER — TELEPHONE (OUTPATIENT)
Dept: FAMILY MEDICINE CLINIC | Facility: CLINIC | Age: 79
End: 2024-09-03
Payer: MEDICARE

## 2024-09-03 DIAGNOSIS — M19.91 PRIMARY OSTEOARTHRITIS, UNSPECIFIED SITE: ICD-10-CM

## 2024-09-03 RX ORDER — HYDROCODONE BITARTRATE AND ACETAMINOPHEN 10; 325 MG/1; MG/1
TABLET ORAL
Qty: 120 TABLET | Refills: 0 | Status: SHIPPED | OUTPATIENT
Start: 2024-09-03

## 2024-09-03 NOTE — TELEPHONE ENCOUNTER
PT AND DAUGHTER CALLED WISHING TO SPEAK WITH PCP OR MA. PT NEEDS REFILL OF HYDROCODONE. RX FROM 8/27 IS MISSING, BELIEVED TAKEN BY . [facility] ADVISED THEY WOULD NOT REPLACE MED UNLESS PRESCRIBING PHYSICIAN SPOKE WITH THEM. PLEASE ADVISE.

## 2024-09-03 NOTE — TELEPHONE ENCOUNTER
Maylin tell Maylin that I called in the prescription for her to [facility] at [facility].  I put down that she can refill it early but that does not mean they will.  She should probably file a police report of stolen medications.

## 2024-09-03 NOTE — TELEPHONE ENCOUNTER
"Gave message to patient at 4:18pm. She will check with [facility] to see when she can fill it. She cannot prove when the pills were taken and by who so she doesn't think she can file a police report. She \"thinks\" someone that was caring for her  as he was dying took the pills from the bottle and replaced them with Tylenol. But she cannot prove that or even say when the pills went missing. She got very upset at the end of the call.  "

## 2024-09-03 NOTE — TELEPHONE ENCOUNTER
Patient called back in complete tears asking for the Hydrocodone rx to be sent to [facility] at  instead of [facility]. [facility] would not fill it until 9/20 and [facility] will fill it 9/10. Patient said she will just take two a day until she can pick it up at [facility] on 9/10. She is asking for this to be done ASAP.

## 2024-09-09 RX ORDER — METOPROLOL SUCCINATE 50 MG/1
TABLET, EXTENDED RELEASE ORAL
Qty: 90 TABLET | Refills: 3 | Status: SHIPPED | OUTPATIENT
Start: 2024-09-09

## 2024-09-19 DIAGNOSIS — F41.8 SITUATIONAL ANXIETY: ICD-10-CM

## 2024-09-21 DIAGNOSIS — F41.8 SITUATIONAL ANXIETY: ICD-10-CM

## 2024-09-21 RX ORDER — DIAZEPAM 10 MG
TABLET ORAL
Qty: 3 TABLET | Refills: 1 | Status: SHIPPED | OUTPATIENT
Start: 2024-09-21

## 2024-09-21 RX ORDER — DIAZEPAM 10 MG
TABLET ORAL
Qty: 3 TABLET | Refills: 0 | OUTPATIENT
Start: 2024-09-21

## 2024-09-23 DIAGNOSIS — R39.15 URINARY URGENCY: Primary | ICD-10-CM

## 2024-09-23 DIAGNOSIS — R30.0 DYSURIA: ICD-10-CM

## 2024-09-24 ENCOUNTER — TELEPHONE (OUTPATIENT)
Dept: FAMILY MEDICINE CLINIC | Facility: CLINIC | Age: 79
End: 2024-09-24
Payer: MEDICARE

## 2024-09-24 ENCOUNTER — LAB (OUTPATIENT)
Dept: FAMILY MEDICINE CLINIC | Facility: CLINIC | Age: 79
End: 2024-09-24
Payer: MEDICARE

## 2024-09-24 DIAGNOSIS — M19.91 PRIMARY OSTEOARTHRITIS, UNSPECIFIED SITE: ICD-10-CM

## 2024-09-24 LAB
BILIRUB UR QL STRIP: NEGATIVE
CLARITY UR: ABNORMAL
COLOR UR: YELLOW
GLUCOSE UR STRIP-MCNC: NEGATIVE MG/DL
HGB UR QL STRIP.AUTO: NEGATIVE
HOLD SPECIMEN: NORMAL
KETONES UR QL STRIP: NEGATIVE
LEUKOCYTE ESTERASE UR QL STRIP.AUTO: ABNORMAL
NITRITE UR QL STRIP: POSITIVE
PH UR STRIP.AUTO: 5.5 [PH] (ref 5–8)
PROT UR QL STRIP: NEGATIVE
SP GR UR STRIP: 1.02 (ref 1–1.03)
UROBILINOGEN UR QL STRIP: ABNORMAL

## 2024-09-24 PROCEDURE — 87077 CULTURE AEROBIC IDENTIFY: CPT | Performed by: FAMILY MEDICINE

## 2024-09-24 PROCEDURE — 87086 URINE CULTURE/COLONY COUNT: CPT | Performed by: FAMILY MEDICINE

## 2024-09-24 PROCEDURE — 87186 SC STD MICRODIL/AGAR DIL: CPT | Performed by: FAMILY MEDICINE

## 2024-09-24 PROCEDURE — 81001 URINALYSIS AUTO W/SCOPE: CPT | Performed by: FAMILY MEDICINE

## 2024-09-24 RX ORDER — HYDROCODONE BITARTRATE AND ACETAMINOPHEN 10; 325 MG/1; MG/1
TABLET ORAL
Qty: 360 TABLET | Refills: 0 | Status: SHIPPED | OUTPATIENT
Start: 2024-09-24

## 2024-09-25 LAB
BACTERIA UR QL AUTO: ABNORMAL /HPF
HYALINE CASTS UR QL AUTO: ABNORMAL /LPF
RBC # UR STRIP: ABNORMAL /HPF
REF LAB TEST METHOD: ABNORMAL
SQUAMOUS #/AREA URNS HPF: ABNORMAL /HPF
WBC # UR STRIP: ABNORMAL /HPF

## 2024-09-25 RX ORDER — CIPROFLOXACIN 250 MG/1
250 TABLET, FILM COATED ORAL 2 TIMES DAILY
Qty: 10 TABLET | Refills: 1 | Status: SHIPPED | OUTPATIENT
Start: 2024-09-25

## 2024-09-26 LAB — BACTERIA SPEC AEROBE CULT: ABNORMAL

## 2024-10-03 ENCOUNTER — TELEPHONE (OUTPATIENT)
Dept: FAMILY MEDICINE CLINIC | Facility: CLINIC | Age: 79
End: 2024-10-03
Payer: MEDICARE
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
## 2024-10-03 NOTE — TELEPHONE ENCOUNTER
Called [facility] Spoke with Lima in Pharmacy dept.     This order is already in processing and preparing to ship.   #360 for 90 day     She should receive this within 7 days

## 2024-10-03 NOTE — TELEPHONE ENCOUNTER
"Gave 's message to patient at 3:09pm.     Also gave her the message from :    \"Tell Maylin to stay active and if it is at all possible try to cut her dose back to 2 or 3 a day rather than 4 a day.  That will make it easier for her to get the medicine in the future.\"      Patient voiced understanding and states she has been trying to cut back to 2 or 3 pills a day because she was running out.  "
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
## 2024-10-15 ENCOUNTER — LAB (OUTPATIENT)
Dept: FAMILY MEDICINE CLINIC | Facility: CLINIC | Age: 79
End: 2024-10-15
Payer: MEDICARE

## 2024-10-15 DIAGNOSIS — R39.15 URINARY URGENCY: Primary | ICD-10-CM

## 2024-10-15 DIAGNOSIS — R39.15 URINARY URGENCY: ICD-10-CM

## 2024-10-15 PROCEDURE — 87077 CULTURE AEROBIC IDENTIFY: CPT | Performed by: FAMILY MEDICINE

## 2024-10-15 PROCEDURE — 81001 URINALYSIS AUTO W/SCOPE: CPT | Performed by: FAMILY MEDICINE

## 2024-10-15 PROCEDURE — 87086 URINE CULTURE/COLONY COUNT: CPT | Performed by: FAMILY MEDICINE

## 2024-10-15 PROCEDURE — 87186 SC STD MICRODIL/AGAR DIL: CPT

## 2024-10-16 LAB
BACTERIA UR QL AUTO: ABNORMAL /HPF
BILIRUB UR QL STRIP: NEGATIVE
CLARITY UR: ABNORMAL
COLOR UR: YELLOW
GLUCOSE UR STRIP-MCNC: NEGATIVE MG/DL
HGB UR QL STRIP.AUTO: ABNORMAL
HOLD SPECIMEN: NORMAL
HYALINE CASTS UR QL AUTO: ABNORMAL /LPF
KETONES UR QL STRIP: ABNORMAL
LEUKOCYTE ESTERASE UR QL STRIP.AUTO: ABNORMAL
NITRITE UR QL STRIP: POSITIVE
PH UR STRIP.AUTO: 5.5 [PH] (ref 5–8)
PROT UR QL STRIP: ABNORMAL
RBC # UR STRIP: ABNORMAL /HPF
REF LAB TEST METHOD: ABNORMAL
SP GR UR STRIP: 1.02 (ref 1–1.03)
SQUAMOUS #/AREA URNS HPF: ABNORMAL /HPF
UROBILINOGEN UR QL STRIP: ABNORMAL
WBC # UR STRIP: ABNORMAL /HPF

## 2024-10-16 RX ORDER — NYSTATIN 100000 [USP'U]/G
POWDER TOPICAL
Qty: 60 G | Refills: 17 | Status: SHIPPED | OUTPATIENT
Start: 2024-10-16

## 2024-10-18 LAB — BACTERIA SPEC AEROBE CULT: ABNORMAL

## 2024-10-18 RX ORDER — AMOXICILLIN AND CLAVULANATE POTASSIUM 500; 125 MG/1; MG/1
1 TABLET, FILM COATED ORAL 2 TIMES DAILY
Qty: 14 TABLET | Refills: 1 | Status: SHIPPED | OUTPATIENT
Start: 2024-10-18

## 2024-10-23 ENCOUNTER — PATIENT MESSAGE (OUTPATIENT)
Dept: FAMILY MEDICINE CLINIC | Facility: CLINIC | Age: 79
End: 2024-10-23
Payer: MEDICARE

## 2024-10-23 DIAGNOSIS — N39.0 RECURRENT UTI: Primary | ICD-10-CM

## 2024-11-08 ENCOUNTER — LAB (OUTPATIENT)
Dept: FAMILY MEDICINE CLINIC | Facility: CLINIC | Age: 79
End: 2024-11-08
Payer: MEDICARE

## 2024-11-08 DIAGNOSIS — N39.0 RECURRENT UTI: ICD-10-CM

## 2024-11-08 LAB
BILIRUB UR QL STRIP: NEGATIVE
CLARITY UR: ABNORMAL
COLOR UR: ABNORMAL
GLUCOSE UR STRIP-MCNC: NEGATIVE MG/DL
HGB UR QL STRIP.AUTO: NEGATIVE
HOLD SPECIMEN: NORMAL
KETONES UR QL STRIP: ABNORMAL
LEUKOCYTE ESTERASE UR QL STRIP.AUTO: ABNORMAL
NITRITE UR QL STRIP: POSITIVE
PH UR STRIP.AUTO: 5.5 [PH] (ref 5–8)
PROT UR QL STRIP: ABNORMAL
SP GR UR STRIP: 1.02 (ref 1–1.03)
UROBILINOGEN UR QL STRIP: ABNORMAL

## 2024-11-08 PROCEDURE — 81001 URINALYSIS AUTO W/SCOPE: CPT | Performed by: FAMILY MEDICINE

## 2024-11-08 PROCEDURE — 87077 CULTURE AEROBIC IDENTIFY: CPT | Performed by: FAMILY MEDICINE

## 2024-11-08 PROCEDURE — 87186 SC STD MICRODIL/AGAR DIL: CPT

## 2024-11-08 PROCEDURE — 87086 URINE CULTURE/COLONY COUNT: CPT | Performed by: FAMILY MEDICINE

## 2024-11-09 RX ORDER — CIPROFLOXACIN 500 MG/1
500 TABLET, FILM COATED ORAL 2 TIMES DAILY
Qty: 10 TABLET | Refills: 0 | Status: SHIPPED | OUTPATIENT
Start: 2024-11-09

## 2024-11-09 NOTE — PROGRESS NOTES
Call Maylin and tell her she has a urinary tract infection.  We better get her started on an antibiotic.  I will call in Cipro 500mg twice a day for 5 days.

## 2024-11-11 LAB — BACTERIA SPEC AEROBE CULT: ABNORMAL

## 2024-11-11 NOTE — PROGRESS NOTES
She is on Cipro so she should be well covered  Ask her to finish it and then in about a week or 2 ask her to come in and give us a urine sample to make sure it is cleared

## 2024-11-21 ENCOUNTER — LAB (OUTPATIENT)
Dept: FAMILY MEDICINE CLINIC | Facility: CLINIC | Age: 79
End: 2024-11-21
Payer: MEDICARE

## 2024-11-21 DIAGNOSIS — R39.15 URINARY URGENCY: ICD-10-CM

## 2024-11-21 DIAGNOSIS — N39.0 RECURRENT UTI: Primary | ICD-10-CM

## 2024-11-21 DIAGNOSIS — R30.0 DYSURIA: ICD-10-CM

## 2024-11-21 LAB
BILIRUB UR QL STRIP: NEGATIVE
CLARITY UR: ABNORMAL
COLOR UR: YELLOW
GLUCOSE UR STRIP-MCNC: NEGATIVE MG/DL
HGB UR QL STRIP.AUTO: NEGATIVE
HOLD SPECIMEN: NORMAL
KETONES UR QL STRIP: NEGATIVE
LEUKOCYTE ESTERASE UR QL STRIP.AUTO: ABNORMAL
NITRITE UR QL STRIP: NEGATIVE
PH UR STRIP.AUTO: 5.5 [PH] (ref 5–8)
PROT UR QL STRIP: NEGATIVE
SP GR UR STRIP: 1.02 (ref 1–1.03)
UROBILINOGEN UR QL STRIP: ABNORMAL

## 2024-11-21 PROCEDURE — 87086 URINE CULTURE/COLONY COUNT: CPT | Performed by: FAMILY MEDICINE

## 2024-11-21 PROCEDURE — 81001 URINALYSIS AUTO W/SCOPE: CPT | Performed by: FAMILY MEDICINE

## 2024-11-21 PROCEDURE — 82570 ASSAY OF URINE CREATININE: CPT | Performed by: FAMILY MEDICINE

## 2024-11-21 PROCEDURE — 87077 CULTURE AEROBIC IDENTIFY: CPT | Performed by: FAMILY MEDICINE

## 2024-11-21 PROCEDURE — 82043 UR ALBUMIN QUANTITATIVE: CPT | Performed by: FAMILY MEDICINE

## 2024-11-21 PROCEDURE — 87186 SC STD MICRODIL/AGAR DIL: CPT | Performed by: FAMILY MEDICINE

## 2024-11-22 ENCOUNTER — TELEPHONE (OUTPATIENT)
Dept: FAMILY MEDICINE CLINIC | Facility: CLINIC | Age: 79
End: 2024-11-22

## 2024-11-22 LAB
ALBUMIN UR-MCNC: <1.2 MG/DL
BACTERIA UR QL AUTO: ABNORMAL /HPF
CREAT UR-MCNC: 123.8 MG/DL
HYALINE CASTS UR QL AUTO: ABNORMAL /LPF
MICROALBUMIN/CREAT UR: NORMAL MG/G{CREAT}
RBC # UR STRIP: ABNORMAL /HPF
REF LAB TEST METHOD: ABNORMAL
SQUAMOUS #/AREA URNS HPF: ABNORMAL /HPF
URATE CRY URNS QL MICRO: PRESENT /HPF
WBC # UR STRIP: ABNORMAL /HPF

## 2024-11-22 RX ORDER — ESTRADIOL 0.1 MG/G
2 CREAM VAGINAL DAILY
Qty: 42.5 G | Refills: 6 | Status: SHIPPED | OUTPATIENT
Start: 2024-11-22

## 2024-11-22 NOTE — TELEPHONE ENCOUNTER
Caller: Joseph Garcia    Relationship: Self    Best call back number: [phone]     What is the best time to reach you: ANY    Who are you requesting to speak with (clinical staff, provider,  specific staff member): CLINICAL    Do you know the name of the person who called: JOSEPH    What was the call regarding:PATIENT  WOULD LIKE A CALL BACK ABOUT  URINALYSIS RESULTS

## 2024-11-24 LAB — BACTERIA SPEC AEROBE CULT: ABNORMAL

## 2024-11-25 RX ORDER — LISINOPRIL 40 MG/1
40 TABLET ORAL DAILY
Qty: 90 TABLET | Refills: 3 | Status: SHIPPED | OUTPATIENT
Start: 2024-11-25

## 2024-12-05 RX ORDER — ESTRADIOL 0.1 MG/G
2 CREAM VAGINAL DAILY
Qty: 42.5 G | Refills: 6 | Status: SHIPPED | OUTPATIENT
Start: 2024-12-05

## 2024-12-06 ENCOUNTER — OFFICE VISIT (OUTPATIENT)
Dept: FAMILY MEDICINE CLINIC | Facility: CLINIC | Age: 79
End: 2024-12-06
Payer: MEDICARE

## 2024-12-06 ENCOUNTER — LAB (OUTPATIENT)
Dept: FAMILY MEDICINE CLINIC | Facility: CLINIC | Age: 79
End: 2024-12-06
Payer: MEDICARE

## 2024-12-06 VITALS
BODY MASS INDEX: 32.62 KG/M2 | OXYGEN SATURATION: 97 % | WEIGHT: 203 LBS | DIASTOLIC BLOOD PRESSURE: 76 MMHG | HEART RATE: 60 BPM | HEIGHT: 66 IN | SYSTOLIC BLOOD PRESSURE: 130 MMHG

## 2024-12-06 DIAGNOSIS — N30.00 ACUTE CYSTITIS WITHOUT HEMATURIA: Primary | ICD-10-CM

## 2024-12-06 DIAGNOSIS — E11.9 TYPE 2 DIABETES MELLITUS WITHOUT COMPLICATION, WITHOUT LONG-TERM CURRENT USE OF INSULIN: ICD-10-CM

## 2024-12-06 DIAGNOSIS — M19.91 PRIMARY OSTEOARTHRITIS, UNSPECIFIED SITE: ICD-10-CM

## 2024-12-06 DIAGNOSIS — E55.9 VITAMIN D DEFICIENCY: ICD-10-CM

## 2024-12-06 DIAGNOSIS — E78.2 MIXED HYPERLIPIDEMIA: ICD-10-CM

## 2024-12-06 DIAGNOSIS — I10 PRIMARY HYPERTENSION: ICD-10-CM

## 2024-12-06 LAB — HOLD SPECIMEN: NORMAL

## 2024-12-06 PROCEDURE — 85025 COMPLETE CBC W/AUTO DIFF WBC: CPT | Performed by: FAMILY MEDICINE

## 2024-12-06 PROCEDURE — 36415 COLL VENOUS BLD VENIPUNCTURE: CPT | Performed by: FAMILY MEDICINE

## 2024-12-06 PROCEDURE — 84443 ASSAY THYROID STIM HORMONE: CPT | Performed by: FAMILY MEDICINE

## 2024-12-06 PROCEDURE — 87077 CULTURE AEROBIC IDENTIFY: CPT | Performed by: FAMILY MEDICINE

## 2024-12-06 PROCEDURE — 87086 URINE CULTURE/COLONY COUNT: CPT | Performed by: FAMILY MEDICINE

## 2024-12-06 PROCEDURE — 83036 HEMOGLOBIN GLYCOSYLATED A1C: CPT | Performed by: FAMILY MEDICINE

## 2024-12-06 PROCEDURE — 82306 VITAMIN D 25 HYDROXY: CPT | Performed by: FAMILY MEDICINE

## 2024-12-06 PROCEDURE — 80053 COMPREHEN METABOLIC PANEL: CPT | Performed by: FAMILY MEDICINE

## 2024-12-06 PROCEDURE — 80061 LIPID PANEL: CPT | Performed by: FAMILY MEDICINE

## 2024-12-06 PROCEDURE — 81001 URINALYSIS AUTO W/SCOPE: CPT | Performed by: FAMILY MEDICINE

## 2024-12-06 PROCEDURE — 84439 ASSAY OF FREE THYROXINE: CPT | Performed by: FAMILY MEDICINE

## 2024-12-06 PROCEDURE — 82607 VITAMIN B-12: CPT | Performed by: FAMILY MEDICINE

## 2024-12-06 PROCEDURE — 87186 SC STD MICRODIL/AGAR DIL: CPT | Performed by: FAMILY MEDICINE

## 2024-12-06 RX ORDER — GLIMEPIRIDE 2 MG/1
2 TABLET ORAL
Qty: 90 TABLET | Refills: 3 | Status: SHIPPED | OUTPATIENT
Start: 2024-12-06 | End: 2025-03-06

## 2024-12-06 NOTE — PROGRESS NOTES
Chief Complaint  Hyperlipidemia (6 month ), Hypertension (6 month ), and Abdominal Pain (Pressure or heaviness)    Subjective        Maylin Garcia presents to [facility]  History of Present Illness  The patient is a 79-year-old female who presents for evaluation of multiple medical concerns.    She suspects a urinary tract infection (UTI) due to increased urination frequency. She has been using Enbrel vaginal cream, which seems to alleviate the frequency. She is curious if this cream could be causing the UTI. She reports no abdominal pain or blood in her urine. She has been making an effort to increase her fluid intake.    She has been engaging in seated elliptical exercises for about 20 minutes, five times a week. She reports feeling stiff most of the time, but the exercise seems to help. She prepares her own meals, focusing on fruits and vegetables, and ensures she gets enough protein. She continues to drive and feels well, especially after her cataract surgery. She had a persistent respiratory infection, which has since resolved. She is motivated to maintain her health for her daughter and granddaughter.    A few years ago, her blood sugar levels were slightly elevated, leading to the initiation of glimepiride. She is currently on a 1 mg dose, but found the 2 mg dose more effective. Her morning blood sugar levels typically range between 110 and 115 with the 2 mg dose. She is considering requesting a 2 mg prescription for herself.    She takes diazepam only when she gets an MRI. Last time she went to the neurologist, he did ask her to have her M spike checked again. She has a dry spot for years. She scratches it as it is itchy. She had a cataract surgery about a year and a half ago.    FAMILY HISTORY  Her sister had neuroendocrine pancreatic cancer and her brother had lung cancer.    IMMUNIZATIONS  She is up to date on her COVID-19, influenza, RSV, and pneumonia vaccines.    "    Objective   Vital Signs:  /76   Pulse 60   Ht 167.6 cm (66\")   Wt 92.1 kg (203 lb)   SpO2 97%   BMI 32.77 kg/m²   Estimated body mass index is 32.77 kg/m² as calculated from the following:    Height as of this encounter: 167.6 cm (66\").    Weight as of this encounter: 92.1 kg (203 lb).               Physical Exam  Constitutional:       Appearance: Normal appearance. She is well-developed and normal weight.   HENT:      Head: Normocephalic and atraumatic.      Right Ear: Tympanic membrane, ear canal and external ear normal.      Left Ear: Tympanic membrane, ear canal and external ear normal.      Nose: Nose normal.      Mouth/Throat:      Mouth: Mucous membranes are moist.      Pharynx: Oropharynx is clear. No oropharyngeal exudate.   Eyes:      Extraocular Movements: Extraocular movements intact.      Conjunctiva/sclera: Conjunctivae normal.      Pupils: Pupils are equal, round, and reactive to light.   Cardiovascular:      Rate and Rhythm: Normal rate and regular rhythm.      Pulses: Normal pulses.      Heart sounds: Normal heart sounds.   Pulmonary:      Effort: Pulmonary effort is normal.      Breath sounds: Normal breath sounds.   Abdominal:      General: Bowel sounds are normal.      Palpations: Abdomen is soft.   Musculoskeletal:         General: Normal range of motion.      Cervical back: Normal range of motion and neck supple.   Skin:     General: Skin is warm and dry.   Neurological:      General: No focal deficit present.      Mental Status: She is alert and oriented to person, place, and time. Mental status is at baseline.   Psychiatric:         Mood and Affect: Mood normal.         Behavior: Behavior normal.         Thought Content: Thought content normal.         Judgment: Judgment normal.        Result Review :        Results                  Assessment & Plan  1. Acute cystitis without hematuria.  She reports recurrent urinary tract symptoms. She has been using topical estrogen cream, " which is expected to decrease vaginal dryness and improve urethral integrity, potentially reducing urinary tract infections. The vaginal cream will be continued at 4 g three times a week. A urine test will be conducted to check for a urinary tract infection. If she has a UTI, it will be documented and treated accordingly.    2. Mixed hyperlipidemia.  A lipid panel will be checked today, and adjustments to her medication will be made if necessary.    3. Hypertension.  Her blood pressure is currently 130/76. She will continue her current medication regimen.    4. Type 2 diabetes.  Her A1c will be checked today. The dose of glimepiride will be increased to 2 mg every morning.    5. Primary osteoarthritis involving multiple sites.  She has arthritis in multiple sites, including her hands, spine, shoulders, hips, and knees. She uses NSAIDs as needed and finds that activity helps keep her limber and reduces stiffness. She is encouraged to stay as active as possible.    6. Vitamin D deficiency.  She has had vitamin D deficiency in the past. Her vitamin D levels will be rechecked today, and replacement therapy will be initiated if needed.    7. Medication Management.  She only takes diazepam when undergoing an MRI. She requested a prescription for glimepiride 2 mg, which will be provided.    8. Health Maintenance.  She received her flu and COVID vaccines this year. She is up to date with her pneumonia vaccine, having received the Prevnar 20.              Follow Up     Return in about 6 months (around 6/6/2025), or if symptoms worsen or fail to improve, for Recheck.  Patient was given instructions and counseling regarding her condition or for health maintenance advice. Please see specific information pulled into the AVS if appropriate.     Patient or patient representative verbalized consent for the use of Ambient Listening during the visit with  Abiodun Busch MD for chart documentation. 12/6/2024  14:26 EST  Answers  submitted by the patient for this visit:  Primary Reason for Visit (Submitted on 12/5/2024)  What is the primary reason for your visit?: Problem Not Listed  Problem not listed (Submitted on 12/5/2024)  Chief Complaint: Other medical problem  Reason for appointment: recurrent UTI's  abdominal pain: No  anorexia: No  joint pain: Yes  change in stool: No  chest pain: No  nasal congestion: Yes  cough: No  diaphoresis: No  fatigue: No  fever: No  headaches: No  joint swelling: Yes  myalgias: Yes  nausea: No  neck pain: No  numbness: Yes  rash: No  sore throat: No  swollen glands: No  dysuria: No  vertigo: No  visual change: No  vomiting: No  weakness: No  Onset: 1 to 6 months  Chronicity: recurrent  Frequency: intermittently  Medications tried: Estradiol Vaginal Cream  Additional information: please retest for UTI and for M-Obed

## 2024-12-07 DIAGNOSIS — D47.2 GAMMOPATHY WITH MULTIPLE M SPIKES: Primary | ICD-10-CM

## 2024-12-07 LAB
25(OH)D3 SERPL-MCNC: 82.1 NG/ML (ref 30–100)
ALBUMIN SERPL-MCNC: 4.6 G/DL (ref 3.5–5.2)
ALBUMIN/GLOB SERPL: 1.6 G/DL
ALP SERPL-CCNC: 57 U/L (ref 39–117)
ALT SERPL W P-5'-P-CCNC: 27 U/L (ref 1–33)
ANION GAP SERPL CALCULATED.3IONS-SCNC: 13.2 MMOL/L (ref 5–15)
AST SERPL-CCNC: 23 U/L (ref 1–32)
BACTERIA UR QL AUTO: ABNORMAL /HPF
BASOPHILS # BLD AUTO: 0.04 10*3/MM3 (ref 0–0.2)
BASOPHILS NFR BLD AUTO: 0.7 % (ref 0–1.5)
BILIRUB SERPL-MCNC: 0.6 MG/DL (ref 0–1.2)
BILIRUB UR QL STRIP: NEGATIVE
BUN SERPL-MCNC: 26 MG/DL (ref 8–23)
BUN/CREAT SERPL: 22.8 (ref 7–25)
CALCIUM SPEC-SCNC: 10.2 MG/DL (ref 8.6–10.5)
CHLORIDE SERPL-SCNC: 102 MMOL/L (ref 98–107)
CHOLEST SERPL-MCNC: 137 MG/DL (ref 0–200)
CLARITY UR: CLEAR
CO2 SERPL-SCNC: 28.8 MMOL/L (ref 22–29)
COLOR UR: YELLOW
CREAT SERPL-MCNC: 1.14 MG/DL (ref 0.57–1)
DEPRECATED RDW RBC AUTO: 46.4 FL (ref 37–54)
EGFRCR SERPLBLD CKD-EPI 2021: 49.7 ML/MIN/1.73
EOSINOPHIL # BLD AUTO: 0.17 10*3/MM3 (ref 0–0.4)
EOSINOPHIL NFR BLD AUTO: 2.8 % (ref 0.3–6.2)
ERYTHROCYTE [DISTWIDTH] IN BLOOD BY AUTOMATED COUNT: 13 % (ref 12.3–15.4)
GLOBULIN UR ELPH-MCNC: 2.9 GM/DL
GLUCOSE SERPL-MCNC: 69 MG/DL (ref 65–99)
GLUCOSE UR STRIP-MCNC: NEGATIVE MG/DL
HBA1C MFR BLD: 5.6 % (ref 4.8–5.6)
HCT VFR BLD AUTO: 41 % (ref 34–46.6)
HDLC SERPL-MCNC: 44 MG/DL (ref 40–60)
HGB BLD-MCNC: 13 G/DL (ref 12–15.9)
HGB UR QL STRIP.AUTO: NEGATIVE
HYALINE CASTS UR QL AUTO: ABNORMAL /LPF
IMM GRANULOCYTES # BLD AUTO: 0.02 10*3/MM3 (ref 0–0.05)
IMM GRANULOCYTES NFR BLD AUTO: 0.3 % (ref 0–0.5)
KETONES UR QL STRIP: NEGATIVE
LDLC SERPL CALC-MCNC: 65 MG/DL (ref 0–100)
LDLC/HDLC SERPL: 1.37 {RATIO}
LEUKOCYTE ESTERASE UR QL STRIP.AUTO: ABNORMAL
LYMPHOCYTES # BLD AUTO: 2.33 10*3/MM3 (ref 0.7–3.1)
LYMPHOCYTES NFR BLD AUTO: 38.3 % (ref 19.6–45.3)
MCH RBC QN AUTO: 31 PG (ref 26.6–33)
MCHC RBC AUTO-ENTMCNC: 31.7 G/DL (ref 31.5–35.7)
MCV RBC AUTO: 97.6 FL (ref 79–97)
MONOCYTES # BLD AUTO: 0.73 10*3/MM3 (ref 0.1–0.9)
MONOCYTES NFR BLD AUTO: 12 % (ref 5–12)
NEUTROPHILS NFR BLD AUTO: 2.79 10*3/MM3 (ref 1.7–7)
NEUTROPHILS NFR BLD AUTO: 45.9 % (ref 42.7–76)
NITRITE UR QL STRIP: NEGATIVE
NRBC BLD AUTO-RTO: 0 /100 WBC (ref 0–0.2)
PH UR STRIP.AUTO: 5.5 [PH] (ref 5–8)
PLATELET # BLD AUTO: 216 10*3/MM3 (ref 140–450)
PMV BLD AUTO: 10.3 FL (ref 6–12)
POTASSIUM SERPL-SCNC: 4.9 MMOL/L (ref 3.5–5.2)
PROT SERPL-MCNC: 7.5 G/DL (ref 6–8.5)
PROT UR QL STRIP: NEGATIVE
RBC # BLD AUTO: 4.2 10*6/MM3 (ref 3.77–5.28)
RBC # UR STRIP: ABNORMAL /HPF
REF LAB TEST METHOD: ABNORMAL
SODIUM SERPL-SCNC: 144 MMOL/L (ref 136–145)
SP GR UR STRIP: 1.02 (ref 1–1.03)
SQUAMOUS #/AREA URNS HPF: ABNORMAL /HPF
T4 FREE SERPL-MCNC: 1.06 NG/DL (ref 0.92–1.68)
TRIGL SERPL-MCNC: 163 MG/DL (ref 0–150)
TSH SERPL DL<=0.05 MIU/L-ACNC: 1.82 UIU/ML (ref 0.27–4.2)
UROBILINOGEN UR QL STRIP: ABNORMAL
VIT B12 BLD-MCNC: 769 PG/ML (ref 211–946)
VLDLC SERPL-MCNC: 28 MG/DL (ref 5–40)
WBC # UR STRIP: ABNORMAL /HPF
WBC NRBC COR # BLD AUTO: 6.08 10*3/MM3 (ref 3.4–10.8)

## 2024-12-07 NOTE — PROGRESS NOTES
Tell Maylin that overall her numbers look better especially when compared to 6 months and a year ago.  Continue low-carb diet and try to get exercise every day like we discussed.  Do not use any ibuprofen or Naprosyn which is Aleve or Advil or medicines like them because your kidneys have difficulty with those medicines.  In about 4 to 6 months I would like to repeat kidney function and make sure it is continuing to get better or return to normal.   It looks like it is improving because she is taking better care of herself but I want to document that.

## 2024-12-09 LAB — BACTERIA SPEC AEROBE CULT: ABNORMAL

## 2024-12-09 RX ORDER — LEVOFLOXACIN 500 MG/1
500 TABLET, FILM COATED ORAL DAILY
Qty: 7 TABLET | Refills: 0 | Status: SHIPPED | OUTPATIENT
Start: 2024-12-09

## 2024-12-11 ENCOUNTER — LAB (OUTPATIENT)
Dept: LAB | Facility: HOSPITAL | Age: 79
End: 2024-12-11
Payer: MEDICARE

## 2024-12-11 PROCEDURE — 84165 PROTEIN E-PHORESIS SERUM: CPT | Performed by: FAMILY MEDICINE

## 2024-12-11 PROCEDURE — 84155 ASSAY OF PROTEIN SERUM: CPT | Performed by: FAMILY MEDICINE

## 2024-12-12 LAB
ALBUMIN SERPL ELPH-MCNC: 4 G/DL (ref 2.9–4.4)
ALBUMIN/GLOB SERPL: 1.4 {RATIO} (ref 0.7–1.7)
ALPHA1 GLOB SERPL ELPH-MCNC: 0.2 G/DL (ref 0–0.4)
ALPHA2 GLOB SERPL ELPH-MCNC: 0.8 G/DL (ref 0.4–1)
B-GLOBULIN SERPL ELPH-MCNC: 0.9 G/DL (ref 0.7–1.3)
GAMMA GLOB SERPL ELPH-MCNC: 1 G/DL (ref 0.4–1.8)
GLOBULIN SER CALC-MCNC: 2.8 G/DL (ref 2.2–3.9)
LABORATORY COMMENT REPORT: ABNORMAL
M PROTEIN SERPL ELPH-MCNC: 0.6 G/DL
PROT SERPL-MCNC: 6.8 G/DL (ref 6–8.5)

## 2024-12-17 DIAGNOSIS — M19.91 PRIMARY OSTEOARTHRITIS, UNSPECIFIED SITE: ICD-10-CM

## 2024-12-18 RX ORDER — HYDROCODONE BITARTRATE AND ACETAMINOPHEN 10; 325 MG/1; MG/1
TABLET ORAL
Qty: 360 TABLET | Refills: 0 | Status: SHIPPED | OUTPATIENT
Start: 2024-12-18

## 2024-12-27 ENCOUNTER — LAB (OUTPATIENT)
Dept: FAMILY MEDICINE CLINIC | Facility: CLINIC | Age: 79
End: 2024-12-27
Payer: MEDICARE

## 2024-12-27 DIAGNOSIS — B95.2 UTI (URINARY TRACT INFECTION) DUE TO ENTEROCOCCUS: Primary | ICD-10-CM

## 2024-12-27 DIAGNOSIS — N39.0 UTI (URINARY TRACT INFECTION) DUE TO ENTEROCOCCUS: Primary | ICD-10-CM

## 2024-12-27 PROCEDURE — 87086 URINE CULTURE/COLONY COUNT: CPT | Performed by: FAMILY MEDICINE

## 2024-12-28 LAB — BACTERIA SPEC AEROBE CULT: NORMAL

## 2024-12-30 RX ORDER — GLIMEPIRIDE 2 MG/1
2 TABLET ORAL
Qty: 90 TABLET | Refills: 3 | Status: SHIPPED | OUTPATIENT
Start: 2024-12-30

## 2025-01-27 ENCOUNTER — TELEPHONE (OUTPATIENT)
Dept: FAMILY MEDICINE CLINIC | Facility: CLINIC | Age: 80
End: 2025-01-27
Payer: MEDICARE

## 2025-01-27 NOTE — TELEPHONE ENCOUNTER
Gave message to patient at 3:20pm. She said it is very hard for her to come to the office. I told her that basically none of our providers are sending antibiotics in without seeing a patient. She said she would have to call us back and declined to schedule an appt. She kept saying the medication had a refill on it but the pharmacy took it away.

## 2025-01-27 NOTE — TELEPHONE ENCOUNTER
Caller: Maylin Garcia    Relationship: Self    Best call back number: [phone]     What medication are you requesting: levoFLOXacin (LEVAQUIN) 500 MG tablet     What are your current symptoms: COUGH AND CONGESTION    How long have you been experiencing symptoms:     Have you had these symptoms before:    [x] Yes  [] No    Have you been treated for these symptoms before:   [x] Yes  [] No    If a prescription is needed, what is your preferred pharmacy and phone number: [id number] - [place], [place] - [address] - [phone]  - [phone]     Additional notes:

## 2025-02-06 ENCOUNTER — OFFICE VISIT (OUTPATIENT)
Dept: FAMILY MEDICINE CLINIC | Facility: CLINIC | Age: 80
End: 2025-02-06
Payer: MEDICARE

## 2025-02-06 VITALS
DIASTOLIC BLOOD PRESSURE: 76 MMHG | BODY MASS INDEX: 31.88 KG/M2 | WEIGHT: 198.4 LBS | RESPIRATION RATE: 18 BRPM | HEART RATE: 74 BPM | SYSTOLIC BLOOD PRESSURE: 140 MMHG | OXYGEN SATURATION: 95 % | HEIGHT: 66 IN

## 2025-02-06 DIAGNOSIS — R05.9 COUGH IN ADULT: ICD-10-CM

## 2025-02-06 DIAGNOSIS — J11.1 INFLUENZA: Primary | ICD-10-CM

## 2025-02-06 LAB
EXPIRATION DATE: NORMAL
FLUAV AG UPPER RESP QL IA.RAPID: NOT DETECTED
FLUBV AG UPPER RESP QL IA.RAPID: NOT DETECTED
INTERNAL CONTROL: NORMAL
Lab: NORMAL
SARS-COV-2 AG UPPER RESP QL IA.RAPID: NOT DETECTED

## 2025-02-06 PROCEDURE — 3077F SYST BP >= 140 MM HG: CPT | Performed by: INTERNAL MEDICINE

## 2025-02-06 PROCEDURE — 3078F DIAST BP <80 MM HG: CPT | Performed by: INTERNAL MEDICINE

## 2025-02-06 PROCEDURE — 99213 OFFICE O/P EST LOW 20 MIN: CPT | Performed by: INTERNAL MEDICINE

## 2025-02-06 PROCEDURE — 1125F AMNT PAIN NOTED PAIN PRSNT: CPT | Performed by: INTERNAL MEDICINE

## 2025-02-06 PROCEDURE — 87428 SARSCOV & INF VIR A&B AG IA: CPT | Performed by: INTERNAL MEDICINE

## 2025-02-06 RX ORDER — BROMPHENIRAMINE MALEATE, PSEUDOEPHEDRINE HYDROCHLORIDE, AND DEXTROMETHORPHAN HYDROBROMIDE 2; 30; 10 MG/5ML; MG/5ML; MG/5ML
SYRUP ORAL
COMMUNITY
Start: 2025-01-27

## 2025-02-06 NOTE — PROGRESS NOTES
Chief Complaint  Cough, Wheezing, and Bodyaches    HPI:    Maylin Garcia presents to [facility]    Patient is a 79-year-old female with a history of hypertension, hyperlipidemia, type 2 diabetes, depression, osteoarthritis, multiple sclerosis presenting for evaluation of upper respiratory symptoms.    Patient initially started with symptoms approximately 6-7 days ago including coughing, wheezing, runny nose, congestion, myalgias, headache, chills, and fatigue. Denies sore throat, sinus pain/pressure, ear pain pressure, lymphadenopathy. Denies fever, nausea, vomiting. One of her grandchild was sick with similar symptoms and had flu outbreak at [facility]. Patient has been trying over the counter pseudoephedrine which has improved . Denies history of asthma, bronchitis, recurrent pneumonia, or recent tobacco use. She did get flu and COVID shots this year.     Review of Systems:  ROS negative unless otherwise noted in HPI above.    Past Medical History:   Diagnosis Date    Allergic many years    Anemia     Arthritis many years    Cataract     both eyes: cataracts removed in 2023    Colon polyp 1984    last polyp removed about 5 years ago next colonoscopy I believe is in 2024    Diabetes mellitus type 2    Diverticulosis     not sure but I think my last colonoscopy showed that    GERD (gastroesophageal reflux disease) intermittingly    Hyperlipidemia years    treated with mend    Hypertension     Low back pain years    Neuromuscular disorder diagnoed with MS in 2024    I guess I'm in remission now    Obesity ongoing    diet & exercise getting better    Urinary tract infection threetimes in last year    Cipro    Visual impairment optic neuritis 2001    good vision now 20230         Current Outpatient Medications:     amLODIPine (NORVASC) 10 MG tablet, TAKE 1 TABLET DAILY, Disp: 90 tablet, Rfl: 3    atorvastatin (LIPITOR) 10 MG tablet, TAKE 1 TABLET DAILY, Disp: 90 tablet, Rfl: 3     CRANBERRY EXTRACT PO, Take  by mouth., Disp: , Rfl:     D-Mannose 500 MG capsule, Take 2 capsules by mouth Daily., Disp: , Rfl:     estradiol (ESTRACE VAGINAL) 0.1 MG/GM vaginal cream, Insert 2 g into the vagina Daily., Disp: 42.5 g, Rfl: 6    Ferrous Sulfate (IRON SUPPLEMENT PO), , Disp: , Rfl:     FLUoxetine (PROzac) 20 MG capsule, TAKE 1 CAPSULE DAILY, Disp: 90 capsule, Rfl: 3    glimepiride (AMARYL) 2 MG tablet, Take 1 tablet by mouth Every Morning Before Breakfast., Disp: 90 tablet, Rfl: 3    hydroCHLOROthiazide 25 MG tablet, TAKE 1 TABLET DAILY, Disp: 90 tablet, Rfl: 3    HYDROcodone-acetaminophen (NORCO)  MG per tablet, Take 1 tbalet every 4-6 hours MAX 4 tablets per day, Disp: 360 tablet, Rfl: 0    lisinopril (PRINIVIL,ZESTRIL) 40 MG tablet, TAKE 1 TABLET DAILY, Disp: 90 tablet, Rfl: 3    metFORMIN (GLUCOPHAGE) 500 MG tablet, TAKE 2 TABLETS TWICE A DAY, Disp: 360 tablet, Rfl: 3    metoprolol succinate XL (TOPROL-XL) 50 MG 24 hr tablet, TAKE 1 TABLET DAILY, Disp: 90 tablet, Rfl: 3    Multiple Vitamin (ONE-A-DAY ESSENTIAL) tablet, Take  by mouth., Disp: , Rfl:     nystatin 179909 UNIT/GM powder, APPLY TOPICALLY TO THE APPROPRIATE AREA AS DIRECTED THREE TIMES A DAY, Disp: 60 g, Rfl: 17    Omega-3 Fatty Acids (fish oil) 1000 MG capsule capsule, Take 1,300 mg by mouth 2 (Two) Times a Day With Meals., Disp: , Rfl:     pantoprazole (PROTONIX) 40 MG EC tablet, TAKE 1 TABLET DAILY, Disp: 90 tablet, Rfl: 3    brompheniramine-pseudoephedrine-DM 30-2-10 MG/5ML syrup, TAKE 5 TO 10 ML BY MOUTH THREE TIMES DAILY FOR UP TO 10 DAYS AS NEEDED FOR CONGESTION OR COUGH (Patient not taking: Reported on 2/6/2025), Disp: , Rfl:     Social History     Socioeconomic History    Marital status:    Tobacco Use    Smoking status: Former     Current packs/day: 0.00     Average packs/day: 1.6 packs/day for 18.8 years (30.0 ttl pk-yrs)     Types: Cigarettes     Start date: 1/1/1967     Quit date: 10/31/1981     Years since  "quittin.2    Smokeless tobacco: Never    Tobacco comments:     Quit smoking 43 years ago   Vaping Use    Vaping status: Never Used   Substance and Sexual Activity    Alcohol use: Never    Drug use: Never    Sexual activity: Not Currently     Partners: Male     Birth control/protection: None        Objective   Vital Signs:  /76   Pulse 74   Resp 18   Ht 167.6 cm (66\")   Wt 90 kg (198 lb 6.4 oz)   SpO2 95%   BMI 32.02 kg/m²   Estimated body mass index is 32.02 kg/m² as calculated from the following:    Height as of this encounter: 167.6 cm (66\").    Weight as of this encounter: 90 kg (198 lb 6.4 oz).    Physical Exam:  General: Well-appearing patient, no apparent distress  HEENT: No posterior pharynx erythema, no tonsillar erythema or exudates, normal external auditory canals, TM normal without bulging or erythema, no middle ear effusions  Cardiac: Regular rate and rhythm, normal S1/S2, no murmur, rubs or gallops, no lower extremity edema  Lungs: Clear to auscultation bilaterally, no crackles or wheezes  Skin: No significant rashes or lesions  MSK: Grossly normal tone and strength  Neuro: Alert and oriented x3, CN II-XII grossly intact  Psych: Appropriate mood and affect    Assessment and Plan:    (J11.1) Influenza  Assessment: Patient present with symptoms consistent with recovering flu, especially given contact with flu positive patients.  Negative test likely secondary to duration from onset of initial symptoms.  Symptoms overall improving with time and anticipate will continue to improve with symptomatic treatment.  No current evidence of superimposed bacterial infection requiring antibiotics. Discussed symptomatic treatment, typical clinical course of illness, as well as signs and symptoms which would prompt reevaluation.  Plan:  - COVID, flu testing negative  - Stay well hydrated, get plenty of rest  - Symptomatic treatment including OTC nasal decongestant, cough suppressant, Tylenol as " needed  - Hold on antibiotics and imaging for now  - Follow up if new/worsening symptoms     Patient was given instructions and counseling regarding her condition or for health maintenance advice. Please see specific information pulled into the AVS if appropriate.       Dr Bay Faulkner   Internal Medicine Physician  [facility]--[address], [address]  [place] [address]

## 2025-02-06 NOTE — PATIENT INSTRUCTIONS
Viral upper respiratory infection, likely flu given contact:  Plan:  - COVID, flu testing deferred due to duration of symptoms  - Stay well hydrated, get plenty of rest  - Symptomatic treatment including over the counter nasal decongestant, cough suppressant, Tylenol as needed  - Hold on antibiotics and imaging for now  - Follow up if new/worsening symptoms

## 2025-02-07 ENCOUNTER — APPOINTMENT (OUTPATIENT)
Dept: GENERAL RADIOLOGY | Facility: HOSPITAL | Age: 80
End: 2025-02-07
Payer: MEDICARE

## 2025-02-07 ENCOUNTER — APPOINTMENT (OUTPATIENT)
Dept: CT IMAGING | Facility: HOSPITAL | Age: 80
End: 2025-02-07
Payer: MEDICARE

## 2025-02-07 ENCOUNTER — HOSPITAL ENCOUNTER (EMERGENCY)
Facility: HOSPITAL | Age: 80
Discharge: HOME | End: 2025-02-07
Payer: MEDICARE

## 2025-02-07 VITALS
DIASTOLIC BLOOD PRESSURE: 66 MMHG | SYSTOLIC BLOOD PRESSURE: 134 MMHG | TEMPERATURE: 98.3 F | OXYGEN SATURATION: 98 % | RESPIRATION RATE: 14 BRPM | HEART RATE: 56 BPM

## 2025-02-07 DIAGNOSIS — R41.0 INTERMITTENT CONFUSION: Primary | ICD-10-CM

## 2025-02-07 DIAGNOSIS — N30.00 ACUTE CYSTITIS WITHOUT HEMATURIA: Primary | ICD-10-CM

## 2025-02-07 LAB
ALBUMIN SERPL-MCNC: 4.7 G/DL (ref 3.5–5.2)
ALBUMIN/GLOB SERPL: 1.5 G/DL
ALP SERPL-CCNC: 58 U/L (ref 39–117)
ALT SERPL W P-5'-P-CCNC: 31 U/L (ref 1–33)
AMPHET+METHAMPHET UR QL: NEGATIVE
AMPHETAMINES UR QL: NEGATIVE
ANION GAP SERPL CALCULATED.3IONS-SCNC: 12.2 MMOL/L (ref 5–15)
AST SERPL-CCNC: 23 U/L (ref 1–32)
BARBITURATES UR QL SCN: NEGATIVE
BASOPHILS # BLD AUTO: 0.03 10*3/MM3 (ref 0–0.2)
BASOPHILS NFR BLD AUTO: 0.5 % (ref 0–1.5)
BENZODIAZ UR QL SCN: NEGATIVE
BILIRUB SERPL-MCNC: 0.3 MG/DL (ref 0–1.2)
BILIRUB UR QL STRIP: NEGATIVE
BUN SERPL-MCNC: 27 MG/DL (ref 8–23)
BUN/CREAT SERPL: 26 (ref 7–25)
BUPRENORPHINE SERPL-MCNC: NEGATIVE NG/ML
CALCIUM SPEC-SCNC: 10 MG/DL (ref 8.6–10.5)
CANNABINOIDS SERPL QL: NEGATIVE
CHLORIDE SERPL-SCNC: 101 MMOL/L (ref 98–107)
CLARITY UR: CLEAR
CO2 SERPL-SCNC: 26.8 MMOL/L (ref 22–29)
COCAINE UR QL: NEGATIVE
COLOR UR: YELLOW
CREAT SERPL-MCNC: 1.04 MG/DL (ref 0.57–1)
DEPRECATED RDW RBC AUTO: 47.6 FL (ref 37–54)
EGFRCR SERPLBLD CKD-EPI 2021: 55.5 ML/MIN/1.73
EOSINOPHIL # BLD AUTO: 0.06 10*3/MM3 (ref 0–0.4)
EOSINOPHIL NFR BLD AUTO: 1 % (ref 0.3–6.2)
ERYTHROCYTE [DISTWIDTH] IN BLOOD BY AUTOMATED COUNT: 13.2 % (ref 12.3–15.4)
ETHANOL UR QL: <0.01 %
GLOBULIN UR ELPH-MCNC: 3.1 GM/DL
GLUCOSE BLDC GLUCOMTR-MCNC: 119 MG/DL (ref 70–105)
GLUCOSE SERPL-MCNC: 117 MG/DL (ref 65–99)
GLUCOSE UR STRIP-MCNC: NEGATIVE MG/DL
HCT VFR BLD AUTO: 41.4 % (ref 34–46.6)
HGB BLD-MCNC: 13.5 G/DL (ref 12–15.9)
HGB UR QL STRIP.AUTO: NEGATIVE
IMM GRANULOCYTES # BLD AUTO: 0.04 10*3/MM3 (ref 0–0.05)
IMM GRANULOCYTES NFR BLD AUTO: 0.7 % (ref 0–0.5)
KETONES UR QL STRIP: NEGATIVE
LEUKOCYTE ESTERASE UR QL STRIP.AUTO: NEGATIVE
LIPASE SERPL-CCNC: 43 U/L (ref 13–60)
LYMPHOCYTES # BLD AUTO: 1.38 10*3/MM3 (ref 0.7–3.1)
LYMPHOCYTES NFR BLD AUTO: 23.4 % (ref 19.6–45.3)
MAGNESIUM SERPL-MCNC: 1.6 MG/DL (ref 1.6–2.4)
MCH RBC QN AUTO: 31.3 PG (ref 26.6–33)
MCHC RBC AUTO-ENTMCNC: 32.6 G/DL (ref 31.5–35.7)
MCV RBC AUTO: 96.1 FL (ref 79–97)
METHADONE UR QL SCN: NEGATIVE
MONOCYTES # BLD AUTO: 0.42 10*3/MM3 (ref 0.1–0.9)
MONOCYTES NFR BLD AUTO: 7.1 % (ref 5–12)
NEUTROPHILS NFR BLD AUTO: 3.98 10*3/MM3 (ref 1.7–7)
NEUTROPHILS NFR BLD AUTO: 67.3 % (ref 42.7–76)
NITRITE UR QL STRIP: NEGATIVE
NRBC BLD AUTO-RTO: 0 /100 WBC (ref 0–0.2)
NT-PROBNP SERPL-MCNC: 169 PG/ML (ref 0–1800)
OPIATES UR QL: POSITIVE
OXYCODONE UR QL SCN: NEGATIVE
PCP UR QL SCN: NEGATIVE
PH UR STRIP.AUTO: <=5 [PH] (ref 5–8)
PHOSPHATE SERPL-MCNC: 2.8 MG/DL (ref 2.5–4.5)
PLATELET # BLD AUTO: 209 10*3/MM3 (ref 140–450)
PMV BLD AUTO: 9.5 FL (ref 6–12)
POTASSIUM SERPL-SCNC: 4.3 MMOL/L (ref 3.5–5.2)
PROT SERPL-MCNC: 7.8 G/DL (ref 6–8.5)
PROT UR QL STRIP: ABNORMAL
RBC # BLD AUTO: 4.31 10*6/MM3 (ref 3.77–5.28)
SODIUM SERPL-SCNC: 140 MMOL/L (ref 136–145)
SP GR UR STRIP: 1.02 (ref 1–1.03)
TRICYCLICS UR QL SCN: NEGATIVE
TSH SERPL DL<=0.05 MIU/L-ACNC: 2.16 UIU/ML (ref 0.27–4.2)
UROBILINOGEN UR QL STRIP: ABNORMAL
WBC NRBC COR # BLD AUTO: 5.91 10*3/MM3 (ref 3.4–10.8)

## 2025-02-07 PROCEDURE — 83690 ASSAY OF LIPASE: CPT | Performed by: OCCUPATIONAL THERAPIST

## 2025-02-07 PROCEDURE — 84443 ASSAY THYROID STIM HORMONE: CPT | Performed by: OCCUPATIONAL THERAPIST

## 2025-02-07 PROCEDURE — 80053 COMPREHEN METABOLIC PANEL: CPT | Performed by: OCCUPATIONAL THERAPIST

## 2025-02-07 PROCEDURE — 81003 URINALYSIS AUTO W/O SCOPE: CPT | Performed by: OCCUPATIONAL THERAPIST

## 2025-02-07 PROCEDURE — 85025 COMPLETE CBC W/AUTO DIFF WBC: CPT | Performed by: OCCUPATIONAL THERAPIST

## 2025-02-07 PROCEDURE — 83880 ASSAY OF NATRIURETIC PEPTIDE: CPT | Performed by: OCCUPATIONAL THERAPIST

## 2025-02-07 PROCEDURE — 93005 ELECTROCARDIOGRAM TRACING: CPT

## 2025-02-07 PROCEDURE — 83735 ASSAY OF MAGNESIUM: CPT | Performed by: OCCUPATIONAL THERAPIST

## 2025-02-07 PROCEDURE — 82077 ASSAY SPEC XCP UR&BREATH IA: CPT | Performed by: OCCUPATIONAL THERAPIST

## 2025-02-07 PROCEDURE — 70450 CT HEAD/BRAIN W/O DYE: CPT

## 2025-02-07 PROCEDURE — 80306 DRUG TEST PRSMV INSTRMNT: CPT | Performed by: OCCUPATIONAL THERAPIST

## 2025-02-07 PROCEDURE — 82948 REAGENT STRIP/BLOOD GLUCOSE: CPT

## 2025-02-07 PROCEDURE — 71045 X-RAY EXAM CHEST 1 VIEW: CPT

## 2025-02-07 PROCEDURE — 99284 EMERGENCY DEPT VISIT MOD MDM: CPT

## 2025-02-07 PROCEDURE — 84100 ASSAY OF PHOSPHORUS: CPT | Performed by: OCCUPATIONAL THERAPIST

## 2025-02-07 NOTE — ED PROVIDER NOTES
Subjective   History of Present Illness  Patient is a 79-year-old female with past medical history significant for diabetes, GERD, hypertension, and craniotomy presenting to the ED for evaluation of altered mental status.  Her daughter is at bedside and assists with the history.  Patient reports that she had had several close calls while driving.  She noticed that she was feeling disoriented and confused the last couple days.  She denies shortness of breath, chest pain, rhinorrhea, cough, nasal congestion, headache, gait changes, changes in sensation, weakness, fever, dysuria, dysarthria, and facial droop.  She reports that she was on her way to her PCP for evaluation of possible UTI when she suddenly could not remember how to get to the doctor's office.  She reports that she currently well and does not know why she could not find her way around.  She called her daughter, who told her to stop the car and Ranulfo Garcia.  Her daughter then called EMS, who transported her to the ER for evaluation.  They suspected that her blood sugar was at fault, because she had a similar episode a few days ago and her blood sugar was in the 70s, which is very low for her.  She ate something and felt better.  She had a similar spell of episodes about 10 years ago that seemed to self resolve and were never evaluated.  No recent medication changes.  She had suspected flu last week, but she is not experiencing any symptoms at this time.  Patient has some mild headache today.    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  She has follow-up with Dr. Cazares at [facility] for this.        Review of Systems   Constitutional:  Negative for fever.       Past Medical History:   Diagnosis Date    Allergic many years    Anemia     Arthritis many years     Cataract     both eyes: cataracts removed in 2023    Colon polyp 1984    last polyp removed about 5 years ago next colonoscopy I believe is in 2024    Diabetes mellitus type 2    Diverticulosis     not sure but I think my last colonoscopy showed that    GERD (gastroesophageal reflux disease) intermittingly    Hyperlipidemia years    treated with mend    Hypertension     Low back pain years    Neuromuscular disorder diagnoed with MS in 2024    I guess I'm in remission now    Obesity ongoing    diet & exercise getting better    Urinary tract infection threetimes in last year    Cipro    Visual impairment optic neuritis 2001    good vision now 20230       Allergies   Allergen Reactions    Sulfa Antibiotics Urinary Retention and Unknown - High Severity    Citrus Swelling    Nickel Rash    Aluminum Unknown - Low Severity       Past Surgical History:   Procedure Laterality Date    BRAIN SURGERY  2022    biopsy    CHOLECYSTECTOMY      COLONOSCOPY      EYE SURGERY  2022    Cataracts removed in both eyes    JOINT REPLACEMENT  2021    left hip    TONSILLECTOMY         Family History   Problem Relation Age of Onset    Osteoporosis Mother     Arthritis Mother     Heart disease Mother     Asthma Mother     Depression Mother     Arthritis Sister     Cancer Sister         Pancreatic(Neuroendocrine)    Hyperlipidemia Sister     Diabetes Sister         controlled by oral meds    Arthritis Sister     Cancer Brother         Lung  (Neuroendocrine)Prostate    Arthritis Brother     Heart disease Brother     Hyperlipidemia Brother     Diabetes Brother         type 2 controlled by oral meds    Anxiety disorder Brother     Heart disease Brother     Diabetes Father         type 1 insulin    Heart disease Maternal Aunt        Social History     Socioeconomic History    Marital status:    Tobacco Use    Smoking status: Former     Current packs/day: 0.00     Average packs/day: 1.6 packs/day for 18.8 years (30.0 ttl pk-yrs)     Types:  Cigarettes     Start date: 1967     Quit date: 10/31/1981     Years since quittin.3    Smokeless tobacco: Never    Tobacco comments:     Quit smoking 43 years ago   Vaping Use    Vaping status: Never Used   Substance and Sexual Activity    Alcohol use: Never    Drug use: Never    Sexual activity: Not Currently     Partners: Male     Birth control/protection: None           Objective   Physical Exam  Vitals and nursing note reviewed.   Constitutional:       General: She is not in acute distress.     Appearance: She is well-developed. She is not ill-appearing, toxic-appearing or diaphoretic.   HENT:      Head: Normocephalic and atraumatic.      Mouth/Throat:      Mouth: Mucous membranes are moist.      Pharynx: Oropharynx is clear.   Eyes:      General: No visual field deficit or scleral icterus.     Extraocular Movements: Extraocular movements intact.      Right eye: Normal extraocular motion and no nystagmus.      Left eye: Normal extraocular motion and no nystagmus.      Pupils: Pupils are equal, round, and reactive to light.   Neck:      Meningeal: Brudzinski's sign and Kernig's sign absent.   Cardiovascular:      Rate and Rhythm: Normal rate and regular rhythm.      Heart sounds: Murmur heard.      No friction rub. No gallop.   Pulmonary:      Effort: Pulmonary effort is normal.      Breath sounds: No wheezing.   Abdominal:      General: Bowel sounds are normal. There is no distension.      Palpations: Abdomen is soft.      Tenderness: There is no abdominal tenderness. There is no guarding.   Musculoskeletal:         General: No swelling or tenderness. Normal range of motion.      Cervical back: Normal range of motion and neck supple.   Skin:     General: Skin is warm and dry.      Capillary Refill: Capillary refill takes 2 to 3 seconds.      Coloration: Skin is pale.      Findings: No erythema or rash.   Neurological:      Mental Status: She is alert and oriented to person, place, and time.      Cranial  Nerves: No dysarthria or facial asymmetry.      Sensory: No sensory deficit.      Motor: No weakness.      Coordination: Coordination normal.      Deep Tendon Reflexes: Babinski sign absent on the right side. Babinski sign absent on the left side.   Psychiatric:         Mood and Affect: Mood normal.         Speech: Speech normal.         Behavior: Behavior normal.         Procedures           ED Course  ED Course as of 02/07/25 1858   Fri Feb 07, 2025   1411 Waiting on imaging results. [ME]   1443 Discussed findings with patient.  She is resting comfortably in no acute distress.  Speech is normal.  Will give her some fluids and check orthostatic vital signs. [ME]      ED Course User Index  [ME] Karla Liao PA-C      Labs Reviewed   COMPREHENSIVE METABOLIC PANEL - Abnormal; Notable for the following components:       Result Value    Glucose 117 (*)     BUN 27 (*)     Creatinine 1.04 (*)     BUN/Creatinine Ratio 26.0 (*)     eGFR 55.5 (*)     All other components within normal limits    Narrative:     GFR Categories in Chronic Kidney Disease (CKD)      GFR Category          GFR (mL/min/1.73)    Interpretation  G1                     90 or greater         Normal or high (1)  G2                      60-89                Mild decrease (1)  G3a                   45-59                Mild to moderate decrease  G3b                   30-44                Moderate to severe decrease  G4                    15-29                Severe decrease  G5                    14 or less           Kidney failure          (1)In the absence of evidence of kidney disease, neither GFR category G1 or G2 fulfill the criteria for CKD.    eGFR calculation 2021 CKD-EPI creatinine equation, which does not include race as a factor   URINALYSIS W/ CULTURE IF INDICATED - Abnormal; Notable for the following components:    Protein, UA Trace (*)     All other components within normal limits    Narrative:     In absence of clinical symptoms, the  presence of pyuria, bacteria, and/or nitrites on the urinalysis result does not correlate with infection.  Urine microscopic not indicated.   URINE DRUG SCREEN - Abnormal; Notable for the following components:    Opiate Screen Positive (*)     All other components within normal limits    Narrative:     Cutoff For Drugs Screened:    Amphetamines               500 ng/ml  Barbiturates               200 ng/ml  Benzodiazepines            150 ng/ml  Cocaine                    150 ng/ml  Methadone                  200 ng/ml  Opiates                    100 ng/ml  Phencyclidine               25 ng/ml  THC                         50 ng/ml  Methamphetamine            500 ng/ml  Tricyclic Antidepressants  300 ng/ml  Oxycodone                  100 ng/ml  Buprenorphine               10 ng/ml    The normal value for all drugs tested is negative. This report includes unconfirmed screening results, with the cutoff values listed, to be used for medical treatment purposes only.  Unconfirmed results must not be used for non-medical purposes such as employment or legal testing.  Clinical consideration should be applied to any drug of abuse test, particularly when unconfirmed results are used.    All urine drugs of abuse requests without chain of custody are for medical screening purposes only.  False positives are possible.     CBC WITH AUTO DIFFERENTIAL - Abnormal; Notable for the following components:    Immature Grans % 0.7 (*)     All other components within normal limits   POCT GLUCOSE FINGERSTICK - Abnormal; Notable for the following components:    Glucose 119 (*)     All other components within normal limits   LIPASE - Normal   BNP (IN-HOUSE) - Normal    Narrative:     This assay is used as an aid in the diagnosis of individuals suspected of having heart failure. It can be used as an aid in the diagnosis of acute decompensated heart failure (ADHF) in patients presenting with signs and symptoms of ADHF to the emergency department  (ED). In addition, NT-proBNP of <300 pg/mL indicates ADHF is not likely.    Age Range Result Interpretation  NT-proBNP Concentration (pg/mL:      <50             Positive            >450                   Gray                 300-450                    Negative             <300    50-75           Positive            >900                  Gray                300-900                  Negative            <300      >75             Positive            >1800                  Gray                300-1800                  Negative            <300   MAGNESIUM - Normal   PHOSPHORUS - Normal   TSH - Normal   ETHANOL    Narrative:     Plasma Ethanol Clinical Symptoms:    ETOH (%)               Clinical Symptom  .01-.05              No apparent influence  .03-.12              Euphoria, Diminished judgment and attention   .09-.25              Impaired comprehension, Muscle incoordination  .18-.30              Confusion, Staggered gait, Slurred speech  .25-.40              Markedly decreased response to stimuli, unable to stand or                        walk, vomitting, sleep or stupor  .35-.50              Comatose, Anesthesia, Subnormal body temperature       CBC AND DIFFERENTIAL    Narrative:     The following orders were created for panel order CBC & Differential.  Procedure                               Abnormality         Status                     ---------                               -----------         ------                     CBC Auto Differential[[id number]]        Abnormal            Final result                 Please view results for these tests on the individual orders.     CT Head Without Contrast    Result Date: 2/7/2025  Impression: 1. No acute intracranial finding. 2. Chronic posterior left frontal lobe encephalomalacia. 3. Mild chronic microvascular disease and mild atrophy. 4. High left frontal parietal craniotomy. Electronically Signed: Mildred Roberts MD  2/7/2025 2:36 PM EST  Workstation ID:  [id number]    XR Chest 1 View    Result Date: 2/7/2025  Impression: No acute cardiopulmonary findings. Electronically Signed: Mildred Roberts MD  2/7/2025 2:20 PM EST  Workstation ID: [id number]   Medications - No data to display                                                   Medical Decision Making  Patient is a 79-year-old female who presented with the above complaint.  She had the above evaluation and exam.    Imaging independently interpreted by radiology and reviewed by myself.  CT of the head without contrast showed chronic changes but no acute intracranial findings.  X-ray of the chest was negative.    EKG independently interpreted by Dr. Caruso and reviewed by myself.  Sinus bradycardia, rate 57, QT interval 435.  Comparable to prior.    Labs were grossly unremarkable.  Patient had trace protein in her urine, 1.04 creatinine.  The remainder of her CMP, electrolytes, TSH, drug and alcohol screens, BNP, lipase, and CBC were negative.  Blood sugar on arrival was 119    At time of reassessment, patient is resting comfortably no acute distress.  She is afebrile and hemodynamically stable.  Patient is alert and oriented without any signs of neurologic change.  Discussed need for additional follow-up on an outpatient basis with patient and her daughter, who verbalized agreement and understanding.  She was instructed not to drive until she is cleared by a physician.    Amount and/or Complexity of Data Reviewed  Labs: ordered.  Radiology: ordered.        Final diagnoses:   Intermittent confusion       ED Disposition  ED Disposition       ED Disposition   Discharge    Condition   Stable    Comment   --               Abiodun Busch MD  [address]  [place] [facility]  [phone]    Schedule an appointment as soon as possible for a visit            Medication List      No changes were made to your prescriptions during this visit.            Karla Liao PA-C  02/07/25 2022

## 2025-02-07 NOTE — DISCHARGE INSTRUCTIONS
Follow-up with your PCP for additional evaluation.    Monitor blood sugar closely.    Do not drive until released by a physician.    Return to the ER with new or worsening symptoms.

## 2025-02-08 LAB
QT INTERVAL: 435 MS
QTC INTERVAL: 424 MS

## 2025-02-10 ENCOUNTER — TELEPHONE (OUTPATIENT)
Dept: FAMILY MEDICINE CLINIC | Facility: CLINIC | Age: 80
End: 2025-02-10
Payer: MEDICARE

## 2025-02-10 RX ORDER — HYDROCHLOROTHIAZIDE 25 MG/1
25 TABLET ORAL DAILY
Qty: 90 TABLET | Refills: 1 | Status: SHIPPED | OUTPATIENT
Start: 2025-02-10

## 2025-02-10 NOTE — TELEPHONE ENCOUNTER
Caller: GALO SAHNI    Relationship to patient: Emergency Contact    Best call back number: [phone] (home)     Patient is needing: [facility]ING [facility] REQUESTING 10:30-3:30 ON THURSDAY OR 9:30-4:00 ON FRIDAY.

## 2025-02-10 NOTE — TELEPHONE ENCOUNTER
Caller: Joseph Garcia    Relationship: Self    Best call back number: [phone]     Caller requesting test results: JOSEPH    What test was performed: LABS AND IMAGING    When was the test performed: 2/7/25    Where was the test performed: [facility]     Additional notes:

## 2025-02-10 NOTE — TELEPHONE ENCOUNTER
Spoke with patient's daughter Yolis at 3:15pm and rescheduled patient's appt with Dr Busch to 02/13/2025 at 11am (45 minutes).

## 2025-02-10 NOTE — TELEPHONE ENCOUNTER
Spoke with patient, scheduled her for a hospital follow up appointment on Wednesday with Dr. Busch, he will go over the visit and test results at that time.

## 2025-02-13 ENCOUNTER — OFFICE VISIT (OUTPATIENT)
Dept: FAMILY MEDICINE CLINIC | Facility: CLINIC | Age: 80
End: 2025-02-13
Payer: MEDICARE

## 2025-02-13 VITALS
WEIGHT: 199 LBS | DIASTOLIC BLOOD PRESSURE: 74 MMHG | SYSTOLIC BLOOD PRESSURE: 132 MMHG | HEART RATE: 58 BPM | RESPIRATION RATE: 16 BRPM | BODY MASS INDEX: 31.98 KG/M2 | HEIGHT: 66 IN | OXYGEN SATURATION: 96 %

## 2025-02-13 DIAGNOSIS — E16.2 HYPOGLYCEMIA: Primary | ICD-10-CM

## 2025-02-13 DIAGNOSIS — E11.9 TYPE 2 DIABETES MELLITUS WITHOUT COMPLICATION, WITHOUT LONG-TERM CURRENT USE OF INSULIN: ICD-10-CM

## 2025-02-13 DIAGNOSIS — I10 PRIMARY HYPERTENSION: ICD-10-CM

## 2025-02-13 PROCEDURE — 3075F SYST BP GE 130 - 139MM HG: CPT | Performed by: FAMILY MEDICINE

## 2025-02-13 PROCEDURE — 1125F AMNT PAIN NOTED PAIN PRSNT: CPT | Performed by: FAMILY MEDICINE

## 2025-02-13 PROCEDURE — 3078F DIAST BP <80 MM HG: CPT | Performed by: FAMILY MEDICINE

## 2025-02-13 PROCEDURE — 1160F RVW MEDS BY RX/DR IN RCRD: CPT | Performed by: FAMILY MEDICINE

## 2025-02-13 PROCEDURE — 99214 OFFICE O/P EST MOD 30 MIN: CPT | Performed by: FAMILY MEDICINE

## 2025-02-13 PROCEDURE — G2211 COMPLEX E/M VISIT ADD ON: HCPCS | Performed by: FAMILY MEDICINE

## 2025-02-13 PROCEDURE — 1159F MED LIST DOCD IN RCRD: CPT | Performed by: FAMILY MEDICINE

## 2025-02-13 NOTE — PROGRESS NOTES
Chief Complaint  Hospital Follow Up Visit (2/7/25 [facility] ED AMS/confusion)    Subjective        Maylin Garcia presents to [facility]  History of Present Illness  The patient is a 79-year-old female who presents for evaluation of hypoglycemia, type 2 diabetes, and primary hypertension.    She experienced an episode of confusion while driving to the doctor's office, which she attributes to hypoglycemia. She monitors her blood glucose levels three times daily before medication administration. Her blood glucose levels have been stable since discontinuing glimepiride, with readings around 105 fasting and 120 at night. She has been on glimepiride for several years, with the dosage increased from 1 mg to 2 mg a few months ago due to slightly elevated blood glucose levels. She has not experienced any weight loss or dietary changes. She reports feeling extremely fatigued, a symptom that improved last year but has recently worsened. She contracted a mild case of influenza approximately 10 days ago, despite being vaccinated, and has been experiencing persistent fatigue since then. She was tested for both influenza and COVID-19 a week ago, with negative results. She also reports muscle aches and pains, which have been more severe than usual. She has discontinued her glimepiride due to its propensity to lower her blood glucose levels to the 70s. She continues to take metformin regularly.    Her blood pressure was recorded as 119, leading her to withhold her antihypertensive medication this morning. She occasionally takes Tylenol PM at night. She is currently on amlodipine 10 mg, hydrochlorothiazide 25 mg, lisinopril 40 mg, and metoprolol succinate XL 50 mg. She reports that her heart rate increases if she does not take metoprolol. Her heart rate typically ranges from 55 to 60, and occasionally drops to the upper 50s. She takes metoprolol once daily at night.    MEDICATIONS  Current: Metformin,  "amlodipine, hydrochlorothiazide, lisinopril, metoprolol succinate XL, Tylenol PM (as needed)  Discontinued: Glimepiride    IMMUNIZATIONS  She received the influenza vaccine.       Objective   Vital Signs:  /74   Pulse 58   Resp 16   Ht 167.6 cm (66\")   Wt 90.3 kg (199 lb)   SpO2 96%   BMI 32.12 kg/m²   Estimated body mass index is 32.12 kg/m² as calculated from the following:    Height as of this encounter: 167.6 cm (66\").    Weight as of this encounter: 90.3 kg (199 lb).               Physical Exam  Constitutional:       Appearance: Normal appearance. She is well-developed and normal weight.   HENT:      Head: Normocephalic and atraumatic.      Right Ear: Tympanic membrane, ear canal and external ear normal.      Left Ear: Tympanic membrane, ear canal and external ear normal.      Nose: Nose normal.      Mouth/Throat:      Mouth: Mucous membranes are moist.      Pharynx: Oropharynx is clear. No oropharyngeal exudate.   Eyes:      Extraocular Movements: Extraocular movements intact.      Conjunctiva/sclera: Conjunctivae normal.      Pupils: Pupils are equal, round, and reactive to light.   Cardiovascular:      Rate and Rhythm: Normal rate and regular rhythm.      Pulses: Normal pulses.      Heart sounds: Normal heart sounds.   Pulmonary:      Effort: Pulmonary effort is normal.      Breath sounds: Normal breath sounds.   Abdominal:      General: Bowel sounds are normal.      Palpations: Abdomen is soft.   Musculoskeletal:         General: Normal range of motion.      Cervical back: Normal range of motion and neck supple.   Skin:     General: Skin is warm and dry.   Neurological:      General: No focal deficit present.      Mental Status: She is alert and oriented to person, place, and time. Mental status is at baseline.   Psychiatric:         Mood and Affect: Mood normal.         Behavior: Behavior normal.         Thought Content: Thought content normal.         Judgment: Judgment normal.        Result " Review :          Results  Laboratory Studies  Blood sugar was 69 or 70 during an episode of hypoglycemia. Fasting blood sugar is about 105 and 120 at night. Last A1c was 5.6 in December 2024.                Assessment & Plan  1. Hypoglycemia.  She is a type 2 non-insulin-dependent diabetic who is in today to discuss hypoglycemia. She has been on a combination of metformin and glimepiride for many years, but she began having some low spells several months ago. I am not sure how her dose got changed and she does not remember, but somehow she went from 1 mg of glimepiride a day to 2. Ever since then, she has had low episodes and her A1c was last checked, it was like 5.8%. She had an episode the other day where she got very confused while driving. She had to pull over on the side of the road and her daughter had to come and get her and take her to the emergency room. In the emergency room, she had a blood sugar in the 60s. I have had her stop the glimepiride now altogether, but to stay on the metformin. I think the glimepiride dose got confused somewhere along the line and she was taking more than she should have. I think it is best we just stop it altogether and we will see how she does over the next 6 months to a year with just metformin and diet and activity.    2. Type 2 diabetes.  The patient's A1c was like 5.8%. We will recheck it again in about 4 months but in the meantime, she is going to stop glimepiride.    3. Primary hypertension.  The other problem she is running into is her blood pressure is getting too low at times and her pulse is getting low. She is on four medicines for her blood pressure and she has learned to hold them when her top number is under 120. She checks her pressures in the morning in the afternoon and if they are low, she does not take her blood pressure medicines that are due at that time. I want her to cut her amlodipine from 10 to 5 and then if her blood pressure remains under 120, I  want her to hold either the lisinopril or the hydrochlorothiazide. If her pulse gets under 60, I want her to hold her metoprolol succinate XL 50. When the XL 50 comes time to be refilled, I am going to change it to the XL 25. We will see how she does with this regimen and hopefully we will get adjustments in her blood pressure medicines taken care of over the next few months.            Follow Up     Return in about 4 months (around 6/13/2025), or if symptoms worsen or fail to improve, for Recheck labs and blood pressure in 4 months..  Patient was given instructions and counseling regarding her condition or for health maintenance advice. Please see specific information pulled into the AVS if appropriate.     Patient or patient representative verbalized consent for the use of Ambient Listening during the visit with  Abiodun Busch MD for chart documentation. 2/13/2025  12:03 EST

## 2025-02-16 RX ORDER — AMLODIPINE BESYLATE 5 MG/1
5 TABLET ORAL DAILY
Qty: 90 TABLET | Refills: 3 | Status: SHIPPED | OUTPATIENT
Start: 2025-02-16

## 2025-02-16 RX ORDER — LISINOPRIL 20 MG/1
20 TABLET ORAL DAILY
Qty: 90 TABLET | Refills: 3 | Status: SHIPPED | OUTPATIENT
Start: 2025-02-16

## 2025-02-16 RX ORDER — METOPROLOL SUCCINATE 25 MG/1
25 TABLET, EXTENDED RELEASE ORAL DAILY
Qty: 90 TABLET | Refills: 3 | Status: SHIPPED | OUTPATIENT
Start: 2025-02-16

## 2025-02-17 NOTE — TELEPHONE ENCOUNTER
Phone Number: [phone]    Reason for Call: IN THE FUTURE, PATIENT WOULD LIKE HER AMLODIPINE, METOPROLOL AND LISINOPRIL TO BE SENT TO [facility]. PATIENT WILL PICK THESE UP AT [facility] THIS TIME. PLEASE NOTATE THIS.

## 2025-02-18 ENCOUNTER — NURSE TRIAGE (OUTPATIENT)
Dept: CALL CENTER | Facility: HOSPITAL | Age: 80
End: 2025-02-18
Payer: MEDICARE

## 2025-02-18 NOTE — TELEPHONE ENCOUNTER
HUB- She has concerns related to her medication-.  Her heart rate is 50 she felt light headed.  Her BP is 130/80. The heart rate is 50 it is really low for her. She is very worried. She was seen in the office on 02/13/25- She is on Metoprolol succinate XL 25 - He reduced it, from 50 to 25 per last visit.       Per office note- She is to hold her Metoprolol if her heart rate gets under 60.She is on 4 BP medications- He wants her to cut her Amlodipine from 10 to 5. If Bp remains under 120- She is to hold the Lisinopril or HCTZ. She states she understands.   Had been recently discharged from [facility] on 02/07/25 for confusion.+-    Did attempt to reach the office. Read patient the note discussed in the office. She states she understands. She has no further questions.   Reason for Disposition   Caller has medicine question, adult has minor symptoms, caller declines triage, AND triager answers question    Additional Information   Negative: [1] Intentional drug overdose AND [2] suicidal thoughts or ideas   Negative: Drug overdose and triager unable to answer question   Negative: Caller requesting a renewal or refill of a medicine patient is currently taking   Negative: Caller requesting information unrelated to medicine   Negative: Caller requesting information about COVID-19 Vaccine   Negative: Caller requesting information about Emergency Contraception   Negative: Caller requesting information about Combined Birth Control Pills   Negative: Caller requesting information about Progestin Birth Control Pills   Negative: Caller requesting information about Post-Op pain or medicines   Negative: Caller requesting a prescription antibiotic (such as Penicillin) for Strep throat and has a positive culture result   Negative: Caller requesting a prescription anti-viral med (such as Tamiflu) and has influenza (flu) symptoms   Negative: Immunization reaction suspected   Negative: Rash while taking a medicine or within 3 days of  stopping it   Negative: [1] Asthma and [2] having symptoms of asthma (cough, wheezing, etc.)   Negative: [1] Symptom of illness (e.g., headache, abdominal pain, earache, vomiting) AND [2] more than mild   Negative: Breastfeeding questions about mother's medicines and diet   Negative: MORE THAN A DOUBLE DOSE of a prescription or over-the-counter (OTC) drug   Negative: [1] DOUBLE DOSE (an extra dose or lesser amount) of prescription drug AND [2] any symptoms (e.g., dizziness, nausea, pain, sleepiness)   Negative: [1] DOUBLE DOSE (an extra dose or lesser amount) of over-the-counter (OTC) drug AND [2] any symptoms (e.g., dizziness, nausea, pain, sleepiness)   Negative: Took another person's prescription drug   Negative: [1] DOUBLE DOSE (an extra dose or lesser amount) of prescription drug AND [2] NO symptoms  (Exception: A double dose of antibiotics.)   Negative: Diabetes drug error or overdose (e.g., took wrong type of insulin or took extra dose)   Negative: [1] Prescription not at pharmacy AND [2] was prescribed by PCP recently (Exception: Triager has access to EMR and prescription is recorded there. Go to Home Care and confirm for pharmacy.)   Negative: [1] Pharmacy calling with prescription question AND [2] triager unable to answer question   Negative: [1] Caller has URGENT medicine question about med that PCP or specialist prescribed AND [2] triager unable to answer question   Negative: Medicine patch causing local rash or itching   Negative: [1] Caller has medicine question about med NOT prescribed by PCP AND [2] triager unable to answer question (e.g., compatibility with other med, storage)   Negative: Prescription request for new medicine (not a refill)   Negative: [1] Caller has NON-URGENT medicine question about med that PCP prescribed AND [2] triager unable to answer question   Negative: Caller wants to use a complementary or alternative medicine   Negative: [1] Prescription prescribed recently is not at  "pharmacy AND [2] triager has access to patient's EMR AND [3] prescription is recorded in the EMR   Negative: [1] DOUBLE DOSE (an extra dose or lesser amount) of over-the-counter (OTC) drug AND [2] NO symptoms   Negative: [1] DOUBLE DOSE (an extra dose or lesser amount) of antibiotic drug AND [2] NO symptoms   Negative: Caller has medicine question only, adult not sick, AND triager answers question    Answer Assessment - Initial Assessment Questions  1. NAME of MEDICINE: \"What medicine(s) are you calling about?\"      All BP medications  2. QUESTION: \"What is your question?\" (e.g., double dose of medicine, side effect)      See note   3. PRESCRIBER: \"Who prescribed the medicine?\" Reason: if prescribed by specialist, call should be referred to that group.      [facility]   4. SYMPTOMS: \"Do you have any symptoms?\" If Yes, ask: \"What symptoms are you having?\"  \"How bad are the symptoms (e.g., mild, moderate, severe)      Heart rate 50  5. PREGNANCY:  \"Is there any chance that you are pregnant?\" \"When was your last menstrual period?\"      no    Protocols used: Medication Question Call-ADULT-AH    "

## 2025-03-06 RX ORDER — AMLODIPINE BESYLATE 5 MG/1
5 TABLET ORAL DAILY
Qty: 90 TABLET | Refills: 3 | Status: SHIPPED | OUTPATIENT
Start: 2025-03-06

## 2025-03-06 RX ORDER — LISINOPRIL 20 MG/1
20 TABLET ORAL DAILY
Qty: 90 TABLET | Refills: 3 | Status: SHIPPED | OUTPATIENT
Start: 2025-03-06

## 2025-03-08 DIAGNOSIS — M19.91 PRIMARY OSTEOARTHRITIS, UNSPECIFIED SITE: ICD-10-CM

## 2025-03-10 RX ORDER — HYDROCODONE BITARTRATE AND ACETAMINOPHEN 10; 325 MG/1; MG/1
TABLET ORAL
Qty: 360 TABLET | Refills: 0 | Status: SHIPPED | OUTPATIENT
Start: 2025-03-10

## 2025-03-20 RX ORDER — ESTRADIOL 0.1 MG/G
2 CREAM VAGINAL DAILY
Qty: 42.5 G | Refills: 6 | Status: SHIPPED | OUTPATIENT
Start: 2025-03-20

## 2025-03-20 RX ORDER — NYSTATIN 100000 [USP'U]/G
POWDER TOPICAL 3 TIMES DAILY
Qty: 60 G | Refills: 3 | Status: SHIPPED | OUTPATIENT
Start: 2025-03-20

## 2025-04-07 RX ORDER — LISINOPRIL 20 MG/1
20 TABLET ORAL DAILY
Qty: 90 TABLET | Refills: 3 | Status: SHIPPED | OUTPATIENT
Start: 2025-04-07

## 2025-04-07 RX ORDER — AMLODIPINE BESYLATE 5 MG/1
5 TABLET ORAL DAILY
Qty: 90 TABLET | Refills: 3 | Status: SHIPPED | OUTPATIENT
Start: 2025-04-07

## 2025-04-09 RX ORDER — PANTOPRAZOLE SODIUM 40 MG/1
40 TABLET, DELAYED RELEASE ORAL DAILY
Qty: 90 TABLET | Refills: 3 | Status: SHIPPED | OUTPATIENT
Start: 2025-04-09

## 2025-05-27 RX ORDER — ESTRADIOL 0.1 MG/G
CREAM VAGINAL
Qty: 42.5 G | Refills: 6 | Status: SHIPPED | OUTPATIENT
Start: 2025-05-27

## 2025-05-29 DIAGNOSIS — M19.91 PRIMARY OSTEOARTHRITIS, UNSPECIFIED SITE: ICD-10-CM

## 2025-05-29 RX ORDER — HYDROCODONE BITARTRATE AND ACETAMINOPHEN 10; 325 MG/1; MG/1
TABLET ORAL
Qty: 315 TABLET | Refills: 0 | Status: SHIPPED | OUTPATIENT
Start: 2025-05-29

## 2025-06-03 DIAGNOSIS — M25.561 ACUTE PAIN OF RIGHT KNEE: Primary | ICD-10-CM

## 2025-06-03 RX ORDER — ESTRADIOL 0.1 MG/G
CREAM VAGINAL
Qty: 42.5 G | Refills: 6 | OUTPATIENT
Start: 2025-06-03

## 2025-06-03 RX ORDER — ESTRADIOL 0.1 MG/G
2 CREAM VAGINAL DAILY
Qty: 42.5 G | Refills: 6 | Status: SHIPPED | OUTPATIENT
Start: 2025-06-03

## 2025-06-05 ENCOUNTER — HOSPITAL ENCOUNTER (OUTPATIENT)
Dept: GENERAL RADIOLOGY | Facility: HOSPITAL | Age: 80
Discharge: HOME | End: 2025-06-05
Payer: MEDICARE

## 2025-06-05 DIAGNOSIS — M25.561 ACUTE PAIN OF RIGHT KNEE: ICD-10-CM

## 2025-06-05 PROCEDURE — 73502 X-RAY EXAM HIP UNI 2-3 VIEWS: CPT

## 2025-06-05 PROCEDURE — 73562 X-RAY EXAM OF KNEE 3: CPT

## 2025-06-06 ENCOUNTER — TELEPHONE (OUTPATIENT)
Dept: FAMILY MEDICINE CLINIC | Facility: CLINIC | Age: 80
End: 2025-06-06
Payer: MEDICARE

## 2025-06-06 NOTE — TELEPHONE ENCOUNTER
Caller: Maylin Garcia    Relationship: Self    Best call back number: [phone]    Who are you requesting to speak with (clinical staff, provider,  specific staff member): REFERRAL STAFF    What was the call regarding: STATE THAT THEY WOULD LIKE TO MAKE SURE THAT THE REFERRAL FOR PAIN MANAGEMENT WAS SENT TO THE CORRECT FAX NUMBER AS THE PROVIDER IS SAYING THEY HAVE NOT RECEIVED ANYTHING. PLEASE CALL AND ADVISE     FAX: [phone]

## 2025-06-09 ENCOUNTER — TELEPHONE (OUTPATIENT)
Dept: FAMILY MEDICINE CLINIC | Facility: CLINIC | Age: 80
End: 2025-06-09
Payer: MEDICARE

## 2025-06-09 RX ORDER — PREDNISONE 20 MG/1
TABLET ORAL
Qty: 25 TABLET | Refills: 1 | Status: SHIPPED | OUTPATIENT
Start: 2025-06-09

## 2025-06-09 RX ORDER — DOXYCYCLINE 100 MG/1
100 CAPSULE ORAL 2 TIMES DAILY
Qty: 20 CAPSULE | Refills: 0 | Status: SHIPPED | OUTPATIENT
Start: 2025-06-09 | End: 2025-06-19

## 2025-06-09 RX ORDER — BROMPHENIRAMINE MALEATE, PSEUDOEPHEDRINE HYDROCHLORIDE, AND DEXTROMETHORPHAN HYDROBROMIDE 2; 30; 10 MG/5ML; MG/5ML; MG/5ML
5-10 SYRUP ORAL 4 TIMES DAILY PRN
Qty: 240 ML | Refills: 1 | Status: SHIPPED | OUTPATIENT
Start: 2025-06-09 | End: 2025-06-19

## 2025-06-09 NOTE — TELEPHONE ENCOUNTER
Patient called this morning crying because she is in horrible pain.    Said [facility] still has not received the referral from us and supposedly we have sent it twice.    Maylin - can you fax referral to [facility] at fax [phone]?

## 2025-06-09 NOTE — TELEPHONE ENCOUNTER
Fax had failed in the system, I re-sent to fax number provided in this encounter, will check back later and make sure the fax when through.

## 2025-06-09 NOTE — TELEPHONE ENCOUNTER
Fax sent successfully at 0946 today.    Notification History      RecipientAction  Fax Sent Successfully  Today09:46:48 AM    Fax Generated  Today09:19:40 AM      Latest Notification    Today09:46:48 AM  Maylin Zhang MA  Delivery Method   Clinical Attachment  Action   Fax Sent Successfully  Fax Number   [phone]

## 2025-06-18 ENCOUNTER — APPOINTMENT (OUTPATIENT)
Dept: GENERAL RADIOLOGY | Facility: HOSPITAL | Age: 80
End: 2025-06-18
Payer: MEDICARE

## 2025-06-18 ENCOUNTER — HOSPITAL ENCOUNTER (EMERGENCY)
Facility: HOSPITAL | Age: 80
Discharge: HOME | End: 2025-06-18
Attending: EMERGENCY MEDICINE
Payer: MEDICARE

## 2025-06-18 VITALS
HEART RATE: 100 BPM | DIASTOLIC BLOOD PRESSURE: 87 MMHG | OXYGEN SATURATION: 97 % | TEMPERATURE: 97.2 F | RESPIRATION RATE: 16 BRPM | BODY MASS INDEX: 32.14 KG/M2 | SYSTOLIC BLOOD PRESSURE: 139 MMHG | WEIGHT: 200 LBS | HEIGHT: 66 IN

## 2025-06-18 DIAGNOSIS — R73.9 HYPERGLYCEMIA: ICD-10-CM

## 2025-06-18 DIAGNOSIS — R42 DIZZINESS: Primary | ICD-10-CM

## 2025-06-18 DIAGNOSIS — J06.9 VIRAL URI: ICD-10-CM

## 2025-06-18 LAB
ANION GAP SERPL CALCULATED.3IONS-SCNC: 10.9 MMOL/L (ref 5–15)
B PARAPERT DNA SPEC QL NAA+PROBE: NOT DETECTED
B PERT DNA SPEC QL NAA+PROBE: NOT DETECTED
BACTERIA UR QL AUTO: ABNORMAL /HPF
BASOPHILS # BLD AUTO: 0.06 10*3/MM3 (ref 0–0.2)
BASOPHILS NFR BLD AUTO: 0.5 % (ref 0–1.5)
BILIRUB UR QL STRIP: NEGATIVE
BUN SERPL-MCNC: 28.5 MG/DL (ref 8–23)
BUN/CREAT SERPL: 27.4 (ref 7–25)
C PNEUM DNA NPH QL NAA+NON-PROBE: NOT DETECTED
CALCIUM SPEC-SCNC: 9.9 MG/DL (ref 8.6–10.5)
CHLORIDE SERPL-SCNC: 101 MMOL/L (ref 98–107)
CLARITY UR: CLEAR
CO2 SERPL-SCNC: 27.1 MMOL/L (ref 22–29)
COLOR UR: YELLOW
CREAT SERPL-MCNC: 1.04 MG/DL (ref 0.57–1)
DEPRECATED RDW RBC AUTO: 45.9 FL (ref 37–54)
EGFRCR SERPLBLD CKD-EPI 2021: 55.5 ML/MIN/1.73
EOSINOPHIL # BLD AUTO: 0 10*3/MM3 (ref 0–0.4)
EOSINOPHIL NFR BLD AUTO: 0 % (ref 0.3–6.2)
ERYTHROCYTE [DISTWIDTH] IN BLOOD BY AUTOMATED COUNT: 12.9 % (ref 12.3–15.4)
FLUAV SUBTYP SPEC NAA+PROBE: NOT DETECTED
FLUBV RNA ISLT QL NAA+PROBE: NOT DETECTED
GLUCOSE BLDC GLUCOMTR-MCNC: 178 MG/DL (ref 70–105)
GLUCOSE SERPL-MCNC: 209 MG/DL (ref 65–99)
GLUCOSE UR STRIP-MCNC: NEGATIVE MG/DL
HADV DNA SPEC NAA+PROBE: NOT DETECTED
HCOV 229E RNA SPEC QL NAA+PROBE: NOT DETECTED
HCOV HKU1 RNA SPEC QL NAA+PROBE: NOT DETECTED
HCOV NL63 RNA SPEC QL NAA+PROBE: NOT DETECTED
HCOV OC43 RNA SPEC QL NAA+PROBE: NOT DETECTED
HCT VFR BLD AUTO: 44 % (ref 34–46.6)
HGB BLD-MCNC: 14.4 G/DL (ref 12–15.9)
HGB UR QL STRIP.AUTO: NEGATIVE
HMPV RNA NPH QL NAA+NON-PROBE: NOT DETECTED
HOLD SPECIMEN: NORMAL
HPIV1 RNA ISLT QL NAA+PROBE: NOT DETECTED
HPIV2 RNA SPEC QL NAA+PROBE: NOT DETECTED
HPIV3 RNA NPH QL NAA+PROBE: NOT DETECTED
HPIV4 P GENE NPH QL NAA+PROBE: NOT DETECTED
HYALINE CASTS UR QL AUTO: ABNORMAL /LPF
IMM GRANULOCYTES # BLD AUTO: 0.32 10*3/MM3 (ref 0–0.05)
IMM GRANULOCYTES NFR BLD AUTO: 2.5 % (ref 0–0.5)
KETONES UR QL STRIP: NEGATIVE
LEUKOCYTE ESTERASE UR QL STRIP.AUTO: ABNORMAL
LYMPHOCYTES # BLD AUTO: 2.19 10*3/MM3 (ref 0.7–3.1)
LYMPHOCYTES NFR BLD AUTO: 17.3 % (ref 19.6–45.3)
M PNEUMO IGG SER IA-ACNC: NOT DETECTED
MCH RBC QN AUTO: 31.4 PG (ref 26.6–33)
MCHC RBC AUTO-ENTMCNC: 32.7 G/DL (ref 31.5–35.7)
MCV RBC AUTO: 96.1 FL (ref 79–97)
MONOCYTES # BLD AUTO: 0.6 10*3/MM3 (ref 0.1–0.9)
MONOCYTES NFR BLD AUTO: 4.7 % (ref 5–12)
NEUTROPHILS NFR BLD AUTO: 75 % (ref 42.7–76)
NEUTROPHILS NFR BLD AUTO: 9.49 10*3/MM3 (ref 1.7–7)
NITRITE UR QL STRIP: NEGATIVE
NRBC BLD AUTO-RTO: 0 /100 WBC (ref 0–0.2)
PH UR STRIP.AUTO: 7 [PH] (ref 5–8)
PLATELET # BLD AUTO: 232 10*3/MM3 (ref 140–450)
PMV BLD AUTO: 9.5 FL (ref 6–12)
POTASSIUM SERPL-SCNC: 4.1 MMOL/L (ref 3.5–5.2)
PROT UR QL STRIP: NEGATIVE
RBC # BLD AUTO: 4.58 10*6/MM3 (ref 3.77–5.28)
RBC # UR STRIP: ABNORMAL /HPF
REF LAB TEST METHOD: ABNORMAL
RHINOVIRUS RNA SPEC NAA+PROBE: DETECTED
RSV RNA NPH QL NAA+NON-PROBE: NOT DETECTED
SARS-COV-2 RNA RESP QL NAA+PROBE: NOT DETECTED
SODIUM SERPL-SCNC: 139 MMOL/L (ref 136–145)
SP GR UR STRIP: 1.02 (ref 1–1.03)
SQUAMOUS #/AREA URNS HPF: ABNORMAL /HPF
UROBILINOGEN UR QL STRIP: ABNORMAL
WBC # UR STRIP: ABNORMAL /HPF
WBC NRBC COR # BLD AUTO: 12.66 10*3/MM3 (ref 3.4–10.8)
WHOLE BLOOD HOLD COAG: NORMAL
YEAST URNS QL MICRO: ABNORMAL /HPF

## 2025-06-18 PROCEDURE — 0202U NFCT DS 22 TRGT SARS-COV-2: CPT | Performed by: EMERGENCY MEDICINE

## 2025-06-18 PROCEDURE — 99284 EMERGENCY DEPT VISIT MOD MDM: CPT

## 2025-06-18 PROCEDURE — 93005 ELECTROCARDIOGRAM TRACING: CPT | Performed by: EMERGENCY MEDICINE

## 2025-06-18 PROCEDURE — 80048 BASIC METABOLIC PNL TOTAL CA: CPT | Performed by: EMERGENCY MEDICINE

## 2025-06-18 PROCEDURE — 71045 X-RAY EXAM CHEST 1 VIEW: CPT

## 2025-06-18 PROCEDURE — 81001 URINALYSIS AUTO W/SCOPE: CPT | Performed by: EMERGENCY MEDICINE

## 2025-06-18 PROCEDURE — 85025 COMPLETE CBC W/AUTO DIFF WBC: CPT | Performed by: EMERGENCY MEDICINE

## 2025-06-18 PROCEDURE — 82948 REAGENT STRIP/BLOOD GLUCOSE: CPT

## 2025-06-18 RX ORDER — SODIUM CHLORIDE 0.9 % (FLUSH) 0.9 %
10 SYRINGE (ML) INJECTION AS NEEDED
Status: DISCONTINUED | OUTPATIENT
Start: 2025-06-18 | End: 2025-06-19 | Stop reason: HOSPADM

## 2025-06-18 RX ADMIN — METFORMIN HYDROCHLORIDE 500 MG: 500 TABLET ORAL at 21:18

## 2025-06-18 RX ADMIN — METFORMIN HYDROCHLORIDE 500 MG: 500 TABLET ORAL at 21:51

## 2025-06-19 LAB
QT INTERVAL: 339 MS
QTC INTERVAL: 433 MS

## 2025-06-19 NOTE — ED PROVIDER NOTES
Subjective   History of Present Illness  Patient is a 79-year-old female complaining of feeling weak and dizzy for a period time this evening at home.  Denies headache cough fever chest pain shortness of breath vomiting or other complaint.  She feels at baseline at this time.      Review of Systems    Past Medical History:   Diagnosis Date    Allergic many years    Anemia     Arthritis many years    Cataract     both eyes: cataracts removed in 2023    Colon polyp 1984    last polyp removed about 5 years ago next colonoscopy I believe is in 2024    Diabetes mellitus type 2    Diverticulosis     not sure but I think my last colonoscopy showed that    GERD (gastroesophageal reflux disease) intermittingly    Hyperlipidemia years    treated with mend    Hypertension     Low back pain years    Neuromuscular disorder diagnoed with MS in 2024    I guess I'm in remission now    Obesity ongoing    diet & exercise getting better    Urinary tract infection threetimes in last year    Cipro    Visual impairment optic neuritis 2001    good vision now 20230       Allergies   Allergen Reactions    Sulfa Antibiotics Urinary Retention and Unknown - High Severity    Citrus Swelling    Nickel Rash    Aluminum Unknown - Low Severity       Past Surgical History:   Procedure Laterality Date    BRAIN SURGERY  2022    biopsy    CHOLECYSTECTOMY      COLONOSCOPY      EYE SURGERY  2022    Cataracts removed in both eyes    JOINT REPLACEMENT  2021    left hip    TONSILLECTOMY         Family History   Problem Relation Age of Onset    Osteoporosis Mother     Arthritis Mother     Heart disease Mother     Asthma Mother     Depression Mother     Arthritis Sister     Cancer Sister         Pancreatic(Neuroendocrine)    Hyperlipidemia Sister     Diabetes Sister         controlled by oral meds    Arthritis Sister     Cancer Brother         Lung  (Neuroendocrine)Prostate    Arthritis Brother     Heart disease Brother     Hyperlipidemia Brother     Diabetes  Brother         type 2 controlled by oral meds    Anxiety disorder Brother     Heart disease Brother     Diabetes Father         type 1 insulin    Heart disease Maternal Aunt        Social History     Socioeconomic History    Marital status:    Tobacco Use    Smoking status: Former     Current packs/day: 0.00     Average packs/day: 1.6 packs/day for 18.8 years (30.0 ttl pk-yrs)     Types: Cigarettes     Start date: 1967     Quit date: 10/31/1981     Years since quittin.6    Smokeless tobacco: Never    Tobacco comments:     Quit smoking 43 years ago   Vaping Use    Vaping status: Never Used   Substance and Sexual Activity    Alcohol use: Never    Drug use: Never    Sexual activity: Not Currently     Partners: Male     Birth control/protection: None           Objective   Physical Exam  Neurologic exam is nonfocal.  Neck has no adenopathy JVD or bruits.  Lungs are clear.  Heart has regular rate rhythm without murmur rub or gallop.  Chest is nontender.  Abdomen soft.  Extremities are unremarkable.  Procedures     My EKG interpretation was normal sinus rhythm at a rate of 98 with no acute ST change      ED Course      Results for orders placed or performed during the hospital encounter of 25   ECG 12 Lead Other; near syncopal episode    Collection Time: 25  8:06 PM   Result Value Ref Range    QT Interval 339 ms    QTC Interval 433 ms   Respiratory Panel PCR w/COVID-19(SARS-CoV-2) DESEAN/JESSE/NIKOLE/PAD/COR/REJI In-House, NP Swab in UTM/VTM, 2 HR TAT - Swab, Nasopharynx    Collection Time: 25  8:10 PM    Specimen: Nasopharynx; Swab   Result Value Ref Range    ADENOVIRUS, PCR Not Detected Not Detected    Coronavirus 229E Not Detected Not Detected    Coronavirus HKU1 Not Detected Not Detected    Coronavirus NL63 Not Detected Not Detected    Coronavirus OC43 Not Detected Not Detected    COVID19 Not Detected Not Detected - Ref. Range    Human Metapneumovirus Not Detected Not Detected    Human  Rhinovirus/Enterovirus Detected (A) Not Detected    Influenza A PCR Not Detected Not Detected    Influenza B PCR Not Detected Not Detected    Parainfluenza Virus 1 Not Detected Not Detected    Parainfluenza Virus 2 Not Detected Not Detected    Parainfluenza Virus 3 Not Detected Not Detected    Parainfluenza Virus 4 Not Detected Not Detected    RSV, PCR Not Detected Not Detected    Bordetella pertussis pcr Not Detected Not Detected    Bordetella parapertussis PCR Not Detected Not Detected    Chlamydophila pneumoniae PCR Not Detected Not Detected    Mycoplasma pneumo by PCR Not Detected Not Detected   Basic Metabolic Panel    Collection Time: 06/18/25  8:10 PM    Specimen: Arm, Left; Blood   Result Value Ref Range    Glucose 209 (H) 65 - 99 mg/dL    BUN 28.5 (H) 8.0 - 23.0 mg/dL    Creatinine 1.04 (H) 0.57 - 1.00 mg/dL    Sodium 139 136 - 145 mmol/L    Potassium 4.1 3.5 - 5.2 mmol/L    Chloride 101 98 - 107 mmol/L    CO2 27.1 22.0 - 29.0 mmol/L    Calcium 9.9 8.6 - 10.5 mg/dL    BUN/Creatinine Ratio 27.4 (H) 7.0 - 25.0    Anion Gap 10.9 5.0 - 15.0 mmol/L    eGFR 55.5 (L) >60.0 mL/min/1.73   CBC Auto Differential    Collection Time: 06/18/25  8:10 PM    Specimen: Arm, Left; Blood   Result Value Ref Range    WBC 12.66 (H) 3.40 - 10.80 10*3/mm3    RBC 4.58 3.77 - 5.28 10*6/mm3    Hemoglobin 14.4 12.0 - 15.9 g/dL    Hematocrit 44.0 34.0 - 46.6 %    MCV 96.1 79.0 - 97.0 fL    MCH 31.4 26.6 - 33.0 pg    MCHC 32.7 31.5 - 35.7 g/dL    RDW 12.9 12.3 - 15.4 %    RDW-SD 45.9 37.0 - 54.0 fl    MPV 9.5 6.0 - 12.0 fL    Platelets 232 140 - 450 10*3/mm3    Neutrophil % 75.0 42.7 - 76.0 %    Lymphocyte % 17.3 (L) 19.6 - 45.3 %    Monocyte % 4.7 (L) 5.0 - 12.0 %    Eosinophil % 0.0 (L) 0.3 - 6.2 %    Basophil % 0.5 0.0 - 1.5 %    Immature Grans % 2.5 (H) 0.0 - 0.5 %    Neutrophils, Absolute 9.49 (H) 1.70 - 7.00 10*3/mm3    Lymphocytes, Absolute 2.19 0.70 - 3.10 10*3/mm3    Monocytes, Absolute 0.60 0.10 - 0.90 10*3/mm3    Eosinophils,  Absolute 0.00 0.00 - 0.40 10*3/mm3    Basophils, Absolute 0.06 0.00 - 0.20 10*3/mm3    Immature Grans, Absolute 0.32 (H) 0.00 - 0.05 10*3/mm3    nRBC 0.0 0.0 - 0.2 /100 WBC   Gold Top - SST    Collection Time: 06/18/25  8:10 PM   Result Value Ref Range    Extra Tube Hold for add-ons.    Light Blue Top    Collection Time: 06/18/25  8:10 PM   Result Value Ref Range    Extra Tube Hold for add-ons.    Urinalysis With Microscopic If Indicated (No Culture) - Urine, Clean Catch    Collection Time: 06/18/25  8:22 PM    Specimen: Urine, Clean Catch   Result Value Ref Range    Color, UA Yellow Yellow, Straw    Appearance, UA Clear Clear    pH, UA 7.0 5.0 - 8.0    Specific Gravity, UA 1.020 1.005 - 1.030    Glucose, UA Negative Negative    Ketones, UA Negative Negative    Bilirubin, UA Negative Negative    Blood, UA Negative Negative    Protein, UA Negative Negative    Leuk Esterase, UA Trace (A) Negative    Nitrite, UA Negative Negative    Urobilinogen, UA 0.2 E.U./dL 0.2 - 1.0 E.U./dL   Urinalysis, Microscopic Only - Urine, Clean Catch    Collection Time: 06/18/25  8:22 PM    Specimen: Urine, Clean Catch   Result Value Ref Range    RBC, UA 0-2 None Seen, 0-2 /HPF    WBC, UA 3-5 (A) None Seen, 0-2 /HPF    Bacteria, UA Trace (A) None Seen /HPF    Squamous Epithelial Cells, UA 3-6 (A) None Seen, 0-2 /HPF    Yeast, UA Small/1+ Budding Yeast (A) None Seen /HPF    Hyaline Casts, UA None Seen None Seen /LPF    Methodology Manual Light Microscopy    POC Glucose Once    Collection Time: 06/18/25  9:08 PM    Specimen: Blood   Result Value Ref Range    Glucose 178 (H) 70 - 105 mg/dL     XR Chest 1 View  Result Date: 6/18/2025  Impression: No evidence of acute disease. Electronically Signed: Luis Hi MD  6/18/2025 9:35 PM EDT  Workstation ID: [id number]                                                     Medical Decision Making  My chest x-ray interpretation with no cardiomegaly fusion or infiltrate.  UA is normal.  CBC shows  leukocytosis with no left shift and no anemia.  Metabolic panel shows mild insufficiency at patient's baseline when I reviewed her old chart.  There is no electrolyte abnormality.  Rester panel is positive for rhinovirus.  Patient made at baseline throughout her ED visit.  She will be discharged follow-up MD for recheck as needed.    Amount and/or Complexity of Data Reviewed  Labs: ordered. Decision-making details documented in ED Course.  Radiology: ordered and independent interpretation performed.  ECG/medicine tests: ordered and independent interpretation performed.    Risk  Prescription drug management.        Final diagnoses:   Dizziness   Viral URI   Hyperglycemia       ED Disposition  ED Disposition       ED Disposition   Discharge    Condition   Stable    Comment   --               No follow-up provider specified.       Medication List      No changes were made to your prescriptions during this visit.            Saul Manrique MD  06/18/25 [id number]

## 2025-06-19 NOTE — ED NOTES
Pts cell phone was left in ambulance. EMS crew made aware and coming back to return cell phone. Pt and pts daughter aware of situation. Pt will be getting a ride back home after phone is returned to pt.

## 2025-06-25 ENCOUNTER — HOSPITAL ENCOUNTER (OUTPATIENT)
Dept: GENERAL RADIOLOGY | Facility: HOSPITAL | Age: 80
Discharge: HOME | End: 2025-06-25
Payer: MEDICARE

## 2025-06-25 ENCOUNTER — TRANSCRIBE ORDERS (OUTPATIENT)
Dept: ADMINISTRATIVE | Facility: HOSPITAL | Age: 80
End: 2025-06-25
Payer: MEDICARE

## 2025-06-25 DIAGNOSIS — M25.561 ACUTE PAIN OF RIGHT KNEE: ICD-10-CM

## 2025-06-25 DIAGNOSIS — M54.50 ACUTE LOW BACK PAIN, UNSPECIFIED BACK PAIN LATERALITY, UNSPECIFIED WHETHER SCIATICA PRESENT: ICD-10-CM

## 2025-06-25 DIAGNOSIS — M25.561 ACUTE PAIN OF RIGHT KNEE: Primary | ICD-10-CM

## 2025-06-25 PROCEDURE — 72120 X-RAY BEND ONLY L-S SPINE: CPT

## 2025-06-25 PROCEDURE — 73562 X-RAY EXAM OF KNEE 3: CPT

## 2025-07-14 ENCOUNTER — TELEPHONE (OUTPATIENT)
Dept: FAMILY MEDICINE CLINIC | Facility: CLINIC | Age: 80
End: 2025-07-14
Payer: MEDICARE

## 2025-07-14 DIAGNOSIS — G89.29 CHRONIC BILATERAL LOW BACK PAIN WITHOUT SCIATICA: Primary | ICD-10-CM

## 2025-07-14 DIAGNOSIS — M54.50 CHRONIC BILATERAL LOW BACK PAIN WITHOUT SCIATICA: Primary | ICD-10-CM

## 2025-07-14 NOTE — TELEPHONE ENCOUNTER
Patient states she needs a referral sent to Dr John Trejo with [facility] at fax [phone]. Patient said she specifically wants Dr John Trejo and wants the [place] office. Said those things need to be on the referral.

## 2025-07-14 NOTE — TELEPHONE ENCOUNTER
Order pended for Dr. Trejo at [facility]. Comment added to order for location.

## 2025-08-08 DIAGNOSIS — M54.50 CHRONIC BILATERAL LOW BACK PAIN WITHOUT SCIATICA: Primary | ICD-10-CM

## 2025-08-08 DIAGNOSIS — G89.29 CHRONIC BILATERAL LOW BACK PAIN WITHOUT SCIATICA: Primary | ICD-10-CM

## 2025-08-08 DIAGNOSIS — M19.91 PRIMARY OSTEOARTHRITIS, UNSPECIFIED SITE: ICD-10-CM

## 2025-08-08 DIAGNOSIS — G35 MULTIPLE SCLEROSIS: ICD-10-CM

## 2025-08-08 RX ORDER — MELOXICAM 7.5 MG/1
7.5 TABLET ORAL 3 TIMES WEEKLY
Qty: 12 TABLET | Refills: 2 | Status: SHIPPED | OUTPATIENT
Start: 2025-08-08

## 2025-08-11 DIAGNOSIS — M19.91 PRIMARY OSTEOARTHRITIS, UNSPECIFIED SITE: ICD-10-CM

## 2025-08-12 RX ORDER — HYDROCODONE BITARTRATE AND ACETAMINOPHEN 10; 325 MG/1; MG/1
TABLET ORAL
Qty: 315 TABLET | Refills: 0 | Status: SHIPPED | OUTPATIENT
Start: 2025-08-12

## 2025-08-13 ENCOUNTER — TELEPHONE (OUTPATIENT)
Dept: LAB | Facility: HOSPITAL | Age: 80
End: 2025-08-13
Payer: MEDICARE

## 2025-08-15 RX ORDER — ESTRADIOL 0.1 MG/G
2 CREAM VAGINAL DAILY
Qty: 127.5 G | Refills: 1 | Status: SHIPPED | OUTPATIENT
Start: 2025-08-15

## 2025-08-15 RX ORDER — HYDROCHLOROTHIAZIDE 25 MG/1
25 TABLET ORAL DAILY
Qty: 90 TABLET | Refills: 3 | Status: SHIPPED | OUTPATIENT
Start: 2025-08-15

## 2025-08-21 ENCOUNTER — LAB (OUTPATIENT)
Dept: FAMILY MEDICINE CLINIC | Facility: CLINIC | Age: 80
End: 2025-08-21
Payer: MEDICARE

## 2025-08-21 ENCOUNTER — OFFICE VISIT (OUTPATIENT)
Dept: FAMILY MEDICINE CLINIC | Facility: CLINIC | Age: 80
End: 2025-08-21
Payer: MEDICARE

## 2025-08-21 VITALS
SYSTOLIC BLOOD PRESSURE: 144 MMHG | RESPIRATION RATE: 16 BRPM | OXYGEN SATURATION: 96 % | WEIGHT: 203 LBS | HEIGHT: 66 IN | BODY MASS INDEX: 32.62 KG/M2 | HEART RATE: 69 BPM | DIASTOLIC BLOOD PRESSURE: 80 MMHG

## 2025-08-21 DIAGNOSIS — I10 PRIMARY HYPERTENSION: ICD-10-CM

## 2025-08-21 DIAGNOSIS — E78.2 MIXED HYPERLIPIDEMIA: ICD-10-CM

## 2025-08-21 DIAGNOSIS — Z12.31 ENCOUNTER FOR SCREENING MAMMOGRAM FOR MALIGNANT NEOPLASM OF BREAST: ICD-10-CM

## 2025-08-21 DIAGNOSIS — G89.29 CHRONIC BILATERAL LOW BACK PAIN WITHOUT SCIATICA: ICD-10-CM

## 2025-08-21 DIAGNOSIS — E11.9 TYPE 2 DIABETES MELLITUS WITHOUT COMPLICATION, WITHOUT LONG-TERM CURRENT USE OF INSULIN: ICD-10-CM

## 2025-08-21 DIAGNOSIS — Z00.00 MEDICARE ANNUAL WELLNESS VISIT, SUBSEQUENT: Primary | ICD-10-CM

## 2025-08-21 DIAGNOSIS — M54.50 CHRONIC BILATERAL LOW BACK PAIN WITHOUT SCIATICA: ICD-10-CM

## 2025-08-21 DIAGNOSIS — Z78.0 POSTMENOPAUSE: ICD-10-CM

## 2025-08-21 DIAGNOSIS — E66.3 OVERWEIGHT: ICD-10-CM

## 2025-08-21 DIAGNOSIS — E55.9 VITAMIN D DEFICIENCY, UNSPECIFIED: ICD-10-CM

## 2025-08-21 DIAGNOSIS — M19.91 PRIMARY OSTEOARTHRITIS, UNSPECIFIED SITE: ICD-10-CM

## 2025-08-21 DIAGNOSIS — G35 MULTIPLE SCLEROSIS: ICD-10-CM

## 2025-08-21 LAB
25(OH)D3 SERPL-MCNC: 78.7 NG/ML (ref 30–100)
ALBUMIN SERPL-MCNC: 4.6 G/DL (ref 3.5–5.2)
ALBUMIN/GLOB SERPL: 1.6 G/DL
ALP SERPL-CCNC: 58 U/L (ref 39–117)
ALT SERPL W P-5'-P-CCNC: 24 U/L (ref 1–33)
ANION GAP SERPL CALCULATED.3IONS-SCNC: 13 MMOL/L (ref 5–15)
AST SERPL-CCNC: 20 U/L (ref 1–32)
BASOPHILS # BLD AUTO: 0.04 10*3/MM3 (ref 0–0.2)
BASOPHILS NFR BLD AUTO: 0.8 % (ref 0–1.5)
BILIRUB SERPL-MCNC: 0.5 MG/DL (ref 0–1.2)
BILIRUB UR QL STRIP: NEGATIVE
BUN SERPL-MCNC: 21 MG/DL (ref 8–23)
BUN/CREAT SERPL: 19.8 (ref 7–25)
CALCIUM SPEC-SCNC: 10.3 MG/DL (ref 8.6–10.5)
CHLORIDE SERPL-SCNC: 100 MMOL/L (ref 98–107)
CHOLEST SERPL-MCNC: 126 MG/DL (ref 0–200)
CLARITY UR: ABNORMAL
CO2 SERPL-SCNC: 27 MMOL/L (ref 22–29)
COLOR UR: YELLOW
CREAT SERPL-MCNC: 1.06 MG/DL (ref 0.57–1)
DEPRECATED RDW RBC AUTO: 47 FL (ref 37–54)
EGFRCR SERPLBLD CKD-EPI 2021: 54.2 ML/MIN/1.73
EOSINOPHIL # BLD AUTO: 0.21 10*3/MM3 (ref 0–0.4)
EOSINOPHIL NFR BLD AUTO: 4.3 % (ref 0.3–6.2)
ERYTHROCYTE [DISTWIDTH] IN BLOOD BY AUTOMATED COUNT: 13 % (ref 12.3–15.4)
GLOBULIN UR ELPH-MCNC: 2.9 GM/DL
GLUCOSE SERPL-MCNC: 102 MG/DL (ref 65–99)
GLUCOSE UR STRIP-MCNC: NEGATIVE MG/DL
HBA1C MFR BLD: 6.6 % (ref 4.8–5.6)
HCT VFR BLD AUTO: 40.6 % (ref 34–46.6)
HDLC SERPL-MCNC: 41 MG/DL (ref 40–60)
HGB BLD-MCNC: 13 G/DL (ref 12–15.9)
HGB UR QL STRIP.AUTO: NEGATIVE
HOLD SPECIMEN: NORMAL
IMM GRANULOCYTES # BLD AUTO: 0.02 10*3/MM3 (ref 0–0.05)
IMM GRANULOCYTES NFR BLD AUTO: 0.4 % (ref 0–0.5)
KETONES UR QL STRIP: NEGATIVE
LDLC SERPL CALC-MCNC: 61 MG/DL (ref 0–100)
LDLC/HDLC SERPL: 1.39 {RATIO}
LEUKOCYTE ESTERASE UR QL STRIP.AUTO: NEGATIVE
LYMPHOCYTES # BLD AUTO: 1.66 10*3/MM3 (ref 0.7–3.1)
LYMPHOCYTES NFR BLD AUTO: 34.3 % (ref 19.6–45.3)
MCH RBC QN AUTO: 31.6 PG (ref 26.6–33)
MCHC RBC AUTO-ENTMCNC: 32 G/DL (ref 31.5–35.7)
MCV RBC AUTO: 98.5 FL (ref 79–97)
MONOCYTES # BLD AUTO: 0.46 10*3/MM3 (ref 0.1–0.9)
MONOCYTES NFR BLD AUTO: 9.5 % (ref 5–12)
NEUTROPHILS NFR BLD AUTO: 2.45 10*3/MM3 (ref 1.7–7)
NEUTROPHILS NFR BLD AUTO: 50.7 % (ref 42.7–76)
NITRITE UR QL STRIP: NEGATIVE
NRBC BLD AUTO-RTO: 0 /100 WBC (ref 0–0.2)
PH UR STRIP.AUTO: 7.5 [PH] (ref 5–8)
PLATELET # BLD AUTO: 217 10*3/MM3 (ref 140–450)
PMV BLD AUTO: 10.4 FL (ref 6–12)
POTASSIUM SERPL-SCNC: 4.5 MMOL/L (ref 3.5–5.2)
PROT SERPL-MCNC: 7.5 G/DL (ref 6–8.5)
PROT UR QL STRIP: NEGATIVE
RBC # BLD AUTO: 4.12 10*6/MM3 (ref 3.77–5.28)
SODIUM SERPL-SCNC: 140 MMOL/L (ref 136–145)
SP GR UR STRIP: 1.02 (ref 1–1.03)
T4 FREE SERPL-MCNC: 0.98 NG/DL (ref 0.92–1.68)
TRIGL SERPL-MCNC: 140 MG/DL (ref 0–150)
TSH SERPL DL<=0.05 MIU/L-ACNC: 2.42 UIU/ML (ref 0.27–4.2)
UROBILINOGEN UR QL STRIP: ABNORMAL
VIT B12 BLD-MCNC: 626 PG/ML (ref 211–946)
VLDLC SERPL-MCNC: 24 MG/DL (ref 5–40)
WBC NRBC COR # BLD AUTO: 4.84 10*3/MM3 (ref 3.4–10.8)

## 2025-08-21 PROCEDURE — 36415 COLL VENOUS BLD VENIPUNCTURE: CPT | Performed by: FAMILY MEDICINE

## 2025-08-21 RX ORDER — MULTIVITAMIN WITH IRON
250 TABLET ORAL DAILY
COMMUNITY
Start: 2025-04-30

## 2025-08-21 RX ORDER — VIT C/B6/B5/MAGNESIUM/HERB 173 50-5-6-5MG
500 CAPSULE ORAL DAILY
COMMUNITY
Start: 2025-05-06

## 2025-08-22 LAB
ALBUMIN SERPL ELPH-MCNC: 3.9 G/DL (ref 2.9–4.4)
ALBUMIN/GLOB SERPL: 1.2 {RATIO} (ref 0.7–1.7)
ALPHA1 GLOB SERPL ELPH-MCNC: 0.2 G/DL (ref 0–0.4)
ALPHA2 GLOB SERPL ELPH-MCNC: 0.8 G/DL (ref 0.4–1)
B-GLOBULIN SERPL ELPH-MCNC: 1 G/DL (ref 0.7–1.3)
GAMMA GLOB SERPL ELPH-MCNC: 1.1 G/DL (ref 0.4–1.8)
GLOBULIN SER CALC-MCNC: 3.3 G/DL (ref 2.2–3.9)
LABORATORY COMMENT REPORT: ABNORMAL
M PROTEIN SERPL ELPH-MCNC: 0.7 G/DL
PROT SERPL-MCNC: 7.2 G/DL (ref 6–8.5)

## 2025-08-26 RX ORDER — ATORVASTATIN CALCIUM 10 MG/1
10 TABLET, FILM COATED ORAL DAILY
Qty: 90 TABLET | Refills: 3 | Status: SHIPPED | OUTPATIENT
Start: 2025-08-26

## 2025-08-29 DIAGNOSIS — D47.2 MONOCLONAL GAMMOPATHY: Primary | ICD-10-CM
